# Patient Record
Sex: MALE | Race: WHITE | NOT HISPANIC OR LATINO | Employment: UNEMPLOYED | URBAN - METROPOLITAN AREA
[De-identification: names, ages, dates, MRNs, and addresses within clinical notes are randomized per-mention and may not be internally consistent; named-entity substitution may affect disease eponyms.]

---

## 2017-05-04 ENCOUNTER — OFFICE VISIT (OUTPATIENT)
Dept: ORTHOPEDIC SURGERY | Facility: CLINIC | Age: 47
End: 2017-05-04

## 2017-05-04 DIAGNOSIS — R52 PAIN: Primary | ICD-10-CM

## 2017-05-04 DIAGNOSIS — Z96.643 STATUS POST TOTAL REPLACEMENT OF BOTH HIPS: ICD-10-CM

## 2017-05-04 DIAGNOSIS — M17.11 PRIMARY OSTEOARTHRITIS OF RIGHT KNEE: ICD-10-CM

## 2017-05-04 PROCEDURE — 99214 OFFICE O/P EST MOD 30 MIN: CPT | Performed by: ORTHOPAEDIC SURGERY

## 2017-05-04 PROCEDURE — 73501 X-RAY EXAM HIP UNI 1 VIEW: CPT | Performed by: ORTHOPAEDIC SURGERY

## 2017-05-05 ENCOUNTER — HOSPITAL ENCOUNTER (OUTPATIENT)
Dept: GENERAL RADIOLOGY | Facility: HOSPITAL | Age: 47
Discharge: HOME | End: 2017-05-05
Attending: ORTHOPAEDIC SURGERY | Admitting: ORTHOPAEDIC SURGERY

## 2017-05-05 DIAGNOSIS — Z96.643 STATUS POST TOTAL REPLACEMENT OF BOTH HIPS: ICD-10-CM

## 2017-05-05 DIAGNOSIS — R52 PAIN: ICD-10-CM

## 2017-05-05 PROCEDURE — 77002 NEEDLE LOCALIZATION BY XRAY: CPT

## 2017-05-05 PROCEDURE — 25010000002 BETAMETHASONE ACET & SOD PHOS PER 4 MG: Performed by: ORTHOPAEDIC SURGERY

## 2017-05-05 RX ORDER — BETAMETHASONE SODIUM PHOSPHATE AND BETAMETHASONE ACETATE 3; 3 MG/ML; MG/ML
2 INJECTION, SUSPENSION INTRA-ARTICULAR; INTRALESIONAL; INTRAMUSCULAR; SOFT TISSUE ONCE
Status: COMPLETED | OUTPATIENT
Start: 2017-05-05 | End: 2017-05-05

## 2017-05-05 RX ORDER — LIDOCAINE HYDROCHLORIDE 10 MG/ML
10 INJECTION, SOLUTION INFILTRATION; PERINEURAL ONCE
Status: COMPLETED | OUTPATIENT
Start: 2017-05-05 | End: 2017-05-05

## 2017-05-05 RX ADMIN — BETAMETHASONE SODIUM PHOSPHATE AND BETAMETHASONE ACETATE 1.98 MG: 3; 3 INJECTION, SUSPENSION INTRA-ARTICULAR; INTRALESIONAL; INTRAMUSCULAR at 10:30

## 2017-05-05 RX ADMIN — LIDOCAINE HYDROCHLORIDE 10 ML: 10 INJECTION, SOLUTION INFILTRATION; PERINEURAL at 10:30

## 2018-04-05 ENCOUNTER — OFFICE VISIT (OUTPATIENT)
Dept: ORTHOPEDIC SURGERY | Facility: CLINIC | Age: 48
End: 2018-04-05

## 2018-04-05 DIAGNOSIS — Z96.643 STATUS POST TOTAL REPLACEMENT OF BOTH HIPS: ICD-10-CM

## 2018-04-05 DIAGNOSIS — R52 PAIN: Primary | ICD-10-CM

## 2018-04-05 DIAGNOSIS — M17.0 PRIMARY OSTEOARTHRITIS OF BOTH KNEES: ICD-10-CM

## 2018-04-05 DIAGNOSIS — M70.62 TROCHANTERIC BURSITIS OF LEFT HIP: ICD-10-CM

## 2018-04-05 PROCEDURE — 20610 DRAIN/INJ JOINT/BURSA W/O US: CPT | Performed by: ORTHOPAEDIC SURGERY

## 2018-04-05 PROCEDURE — 73562 X-RAY EXAM OF KNEE 3: CPT | Performed by: ORTHOPAEDIC SURGERY

## 2018-04-05 PROCEDURE — 73521 X-RAY EXAM HIPS BI 2 VIEWS: CPT | Performed by: ORTHOPAEDIC SURGERY

## 2018-04-05 PROCEDURE — 99214 OFFICE O/P EST MOD 30 MIN: CPT | Performed by: ORTHOPAEDIC SURGERY

## 2018-04-05 RX ORDER — TRIAMCINOLONE ACETONIDE 40 MG/ML
40 INJECTION, SUSPENSION INTRA-ARTICULAR; INTRAMUSCULAR
Status: COMPLETED | OUTPATIENT
Start: 2018-04-05 | End: 2018-04-05

## 2018-04-05 RX ORDER — DICLOFENAC SODIUM 75 MG/1
TABLET, DELAYED RELEASE ORAL
Refills: 1 | COMMUNITY
Start: 2018-02-04 | End: 2018-10-12

## 2018-04-05 RX ORDER — HYDROCHLOROTHIAZIDE 50 MG/1
25 TABLET ORAL DAILY
Refills: 1 | COMMUNITY
Start: 2018-01-14 | End: 2022-05-04

## 2018-04-05 RX ORDER — LIDOCAINE HYDROCHLORIDE 10 MG/ML
4 INJECTION, SOLUTION EPIDURAL; INFILTRATION; INTRACAUDAL; PERINEURAL
Status: COMPLETED | OUTPATIENT
Start: 2018-04-05 | End: 2018-04-05

## 2018-04-05 RX ORDER — CEFDINIR 300 MG/1
300 CAPSULE ORAL 2 TIMES DAILY
COMMUNITY
End: 2018-10-12

## 2018-04-05 RX ORDER — CITALOPRAM 10 MG/1
10 TABLET ORAL DAILY
Refills: 1 | COMMUNITY
Start: 2018-01-14 | End: 2018-10-12

## 2018-04-05 RX ORDER — ASPIRIN 325 MG
325 TABLET ORAL DAILY
COMMUNITY
End: 2018-10-12

## 2018-04-05 RX ADMIN — TRIAMCINOLONE ACETONIDE 40 MG: 40 INJECTION, SUSPENSION INTRA-ARTICULAR; INTRAMUSCULAR at 13:38

## 2018-04-05 RX ADMIN — LIDOCAINE HYDROCHLORIDE 4 ML: 10 INJECTION, SOLUTION EPIDURAL; INFILTRATION; INTRACAUDAL; PERINEURAL at 13:38

## 2018-04-05 NOTE — PROGRESS NOTES
Subjective: Status post bilateral total hip arthroplasty, left trochanteric hip pain, bilateral knee pain left worse than right     Patient ID: Luke Irizarry is a 48 y.o. male.    Chief Complaint:    History of Present Illness patient known to me status post bilateral total hip arthroplasty returns for evaluation of his left hip and both knees.  Previously seen was having some persistent right hip pain an intra-articular injection by x-ray relieved him of the pain.  He is now having pain laterally in the left hip is concerned was that he is having some damage involving the prosthesis.  Also having significant bilateral knee pain.  Last week was seen in urgent care had a cortisone injection in the left knee which helped for a few days most of the pain has returned.  With regard to the hip is having lateral hip pain with activity when lying on the hip.  With regard to the knees is having pain getting in and out of a chair and navigating steps and some sensation the knee wants to lock to the left knee.       Social History     Occupational History   • Not on file.     Social History Main Topics   • Smoking status: Former Smoker     Packs/day: 1.00     Years: 7.00     Types: Cigarettes     Quit date: 2006   • Smokeless tobacco: Never Used   • Alcohol use No   • Drug use: No   • Sexual activity: Yes     Partners: Male      Review of Systems   Constitutional: Negative for chills, diaphoresis, fever and unexpected weight change.   HENT: Negative for hearing loss, nosebleeds, sore throat and tinnitus.    Eyes: Negative for pain and visual disturbance.   Respiratory: Negative for cough, shortness of breath and wheezing.    Cardiovascular: Negative for chest pain and palpitations.   Gastrointestinal: Negative for abdominal pain, diarrhea, nausea and vomiting.   Endocrine: Negative for cold intolerance, heat intolerance and polydipsia.   Genitourinary: Negative for difficulty urinating, dysuria and hematuria.   Musculoskeletal:  Positive for arthralgias and joint swelling.   Skin: Negative for rash and wound.   Allergic/Immunologic: Negative for environmental allergies.   Neurological: Negative for dizziness, syncope and numbness.   Hematological: Does not bruise/bleed easily.   Psychiatric/Behavioral: Negative for dysphoric mood and sleep disturbance. The patient is not nervous/anxious.    All other systems reviewed and are negative.          Objective:     Ortho Exam  AP lateral sunrise view of both knee show some early arthritic changes particularly in the medial patellofemoral compartment compared to previous x-rays in 2 years ago there has been some advancement of the arthritis.  He is alert and oriented ×3.  With regard to the left leg there is no pain with passive internal/external rotation of the hip with a negative Stinchfield test.  Hip flexors and extensors are 5 over 5 with no pain.  His good distal pulses no motor deficit no sensory loss.  Marked pain to palpation of the left greater trochanter pain with abduction of the hip against resistance and a positive Carey's test.  With regard to both knees is no swelling effusion erythema.  There is mild crepitus with range of motion to both knees which is 0 to about 125° but there is no instability 0 90° no patella subluxation.  Quad function is 5 over 5 in his calf is nontender negative Homans and the skin is cool to touch.  With regard to the left knee there is moderate medial joint line tenderness with an equivocal medial Gabriel's.  He has good distal pulses and there is no motor deficit.    Assessment:       1. Pain    2. Status post total replacement of both hips    3. Trochanteric bursitis of left hip    4. Primary osteoarthritis of both knees          Plan:Spent over 20 minutes with the patient and reviewed the x-rays of his knee and his physical exam regarding his hip and both knees.  I discussed treatment options with regard to the hip with physical therapy anti-inflammatory  modification and injections or proceed with an injection and that was accomplished after sterile prepping 4 cc lidocaine 1 cc Kenalog.  Postinjection instructions given to the patient.  With regard to the knees again is having significant pain discomfort cortisone shot given last week gave marginal relief.  He has moderate arthritis noted on the x-ray but he also has possibility of a meniscal tear involving the left knee with the symptoms of the knee locking.  Want to proceed with an MRI of that knee before making any further recommendations return to see me after completion of that test.  Large Joint Arthrocentesis  Date/Time: 4/5/2018 1:38 PM  Consent given by: patient  Site marked: site marked  Timeout: Immediately prior to procedure a time out was called to verify the correct patient, procedure, equipment, support staff and site/side marked as required   Supporting Documentation  Indications: pain   Procedure Details  Location: hip - L greater trochanteric bursa  Preparation: Patient was prepped and draped in the usual sterile fashion  Needle size: 22 G  Approach: lateral  Medications administered: 4 mL lidocaine PF 1% 1 %; 40 mg triamcinolone acetonide 40 MG/ML  Patient tolerance: patient tolerated the procedure well with no immediate complications

## 2018-10-12 ENCOUNTER — HOSPITAL ENCOUNTER (EMERGENCY)
Facility: HOSPITAL | Age: 48
Discharge: HOME | End: 2018-10-12
Attending: EMERGENCY MEDICINE | Admitting: EMERGENCY MEDICINE

## 2018-10-12 VITALS
WEIGHT: 290 LBS | DIASTOLIC BLOOD PRESSURE: 90 MMHG | OXYGEN SATURATION: 96 % | HEIGHT: 72 IN | SYSTOLIC BLOOD PRESSURE: 152 MMHG | RESPIRATION RATE: 18 BRPM | HEART RATE: 74 BPM | TEMPERATURE: 98 F | BODY MASS INDEX: 39.28 KG/M2

## 2018-10-12 DIAGNOSIS — M54.40 BACK PAIN OF LUMBAR REGION WITH SCIATICA: Primary | ICD-10-CM

## 2018-10-12 PROCEDURE — 96372 THER/PROPH/DIAG INJ SC/IM: CPT

## 2018-10-12 PROCEDURE — 99282 EMERGENCY DEPT VISIT SF MDM: CPT | Performed by: EMERGENCY MEDICINE

## 2018-10-12 PROCEDURE — 25010000002 KETOROLAC TROMETHAMINE PER 15 MG: Performed by: EMERGENCY MEDICINE

## 2018-10-12 PROCEDURE — 99283 EMERGENCY DEPT VISIT LOW MDM: CPT

## 2018-10-12 RX ORDER — PANTOPRAZOLE SODIUM 40 MG/1
40 TABLET, DELAYED RELEASE ORAL DAILY
COMMUNITY
End: 2022-05-04 | Stop reason: SDUPTHER

## 2018-10-12 RX ORDER — CELECOXIB 200 MG/1
200 CAPSULE ORAL DAILY
COMMUNITY
End: 2019-08-22

## 2018-10-12 RX ORDER — HYDROCODONE BITARTRATE AND ACETAMINOPHEN 5; 325 MG/1; MG/1
1 TABLET ORAL EVERY 8 HOURS PRN
Qty: 15 TABLET | Refills: 0 | Status: SHIPPED | OUTPATIENT
Start: 2018-10-12 | End: 2018-10-17

## 2018-10-12 RX ORDER — METHYLPREDNISOLONE 4 MG/1
TABLET ORAL
Qty: 21 TABLET | Refills: 0 | Status: SHIPPED | OUTPATIENT
Start: 2018-10-12 | End: 2019-01-07

## 2018-10-12 RX ORDER — KETOROLAC TROMETHAMINE 30 MG/ML
30 INJECTION, SOLUTION INTRAMUSCULAR; INTRAVENOUS ONCE
Status: COMPLETED | OUTPATIENT
Start: 2018-10-12 | End: 2018-10-12

## 2018-10-12 RX ADMIN — KETOROLAC TROMETHAMINE 30 MG: 30 INJECTION INTRAMUSCULAR; INTRAVENOUS at 13:21

## 2018-10-12 NOTE — ED PROVIDER NOTES
Subjective   History of Present Illness  History of Present Illness    Chief complaint: Back pain    Location: Lower back, radiating into both legs    Quality/Severity:  Moderate to severe sharp pains    Timing/Duration: Ongoing for several days now    Modifying Factors: Worse with standing and walking    Narrative: This patient has a history of low back pain problems.  He is being cared for by a spine specialist and has plans to have lumbar spine surgery in December this year.  He denies any recent injuries or traumatic type of situations where he may have hurt his back that he can recall.  However over the past few days his pain seems to be increasing quite a bit in the lumbar spine area.  He says it radiates down both legs equally and causes some mild numbness in the toes.  He has not had any fevers.  He has not had any falls.  He has not had any bowel or bladder incontinence.  Normally he can ambulate without any cane or assistance.  He says lately it has become particularly because the pain in his back with any movement.  He has not been taking any particular medications for the pain up until this time.  Incidentally he did have a lumbar spine surgery about 8 years ago for a similar problem.    Associated Symptoms: As above    Review of Systems   Constitutional: Negative for activity change, diaphoresis and fever.   Respiratory: Negative for shortness of breath.    Cardiovascular: Negative for chest pain.   Gastrointestinal: Negative for abdominal pain.   Genitourinary: Negative for dysuria and flank pain.   Musculoskeletal: Positive for back pain and myalgias.   Skin: Negative for color change, rash and wound.   Neurological: Positive for numbness. Negative for syncope and weakness.   All other systems reviewed and are negative.      Past Medical History:   Diagnosis Date   • Arthritis     RIGHT HAND   • GERD (gastroesophageal reflux disease)    • Hearing deficit    • Hypertension    • Migraines    • Right hip  pain     sched BRAD   • Sleep apnea     no machine, states needs to have new study   • Spinal headache     AFTER SPINAL TAP REQUIRED BLOOD PATCH       No Known Allergies    Past Surgical History:   Procedure Laterality Date   • APPENDECTOMY     • BACK SURGERY      X 2   • CATARACT EXTRACTION      RIGHT EYE CATARACT REMOVED   • KNEE SURGERY Left 2011   • LAPAROSCOPIC CHOLECYSTECTOMY     • SHOULDER SURGERY Bilateral     X 2 Dr. Houston   • THYROIDECTOMY, PARTIAL     • TOTAL HIP ARTHROPLASTY Left 6/21/2016    Procedure: TOTAL HIP ARTHROPLASTY;  Surgeon: Sanchez Goel MD;  Location:  [facility];  Service:    • TOTAL HIP ARTHROPLASTY Right 8/16/2016    Procedure: TOTAL HIP ARTHROPLASTY depuy summit 7fr hemovac drain placement;  Surgeon: Sanchez Goel MD;  Location:  [facility] OR;  Service:    • WRIST SURGERY Left        Family History   Problem Relation Age of Onset   • Lung disease Father    • COPD Father    • Cancer Sister    • Cancer Paternal Aunt    • Diabetes Maternal Grandmother    • Diabetes Maternal Grandfather    • Diabetes Paternal Grandmother    • Diabetes Paternal Grandfather        Social History     Social History   • Marital status:      Social History Main Topics   • Smoking status: Former Smoker     Packs/day: 1.00     Years: 7.00     Types: Cigarettes     Quit date: 2006   • Smokeless tobacco: Never Used   • Alcohol use No   • Drug use: No   • Sexual activity: Yes     Partners: Male     Other Topics Concern   • Not on file       ED Triage Vitals [10/12/18 1257]   Temp Heart Rate Resp BP SpO2   98 °F (36.7 °C) 74 18 152/90 96 %      Temp src Heart Rate Source Patient Position BP Location FiO2 (%)   Oral Monitor Sitting Right arm --         Objective   Physical Exam   Constitutional: He is oriented to person, place, and time. He appears well-developed and well-nourished. No distress.   HENT:   Head: Normocephalic and atraumatic.   Eyes: Pupils are equal, round, and reactive to light. EOM are normal. Right  "eye exhibits no discharge. Left eye exhibits no discharge.   Neck: Normal range of motion. Neck supple.   Cardiovascular: Normal rate, regular rhythm, normal heart sounds and intact distal pulses.  Exam reveals no gallop and no friction rub.    No murmur heard.  Pulmonary/Chest: Effort normal. No respiratory distress. He has no wheezes. He has no rales. He exhibits no tenderness.   Abdominal: Soft. He exhibits no distension. There is no tenderness. There is no guarding.   Musculoskeletal: Normal range of motion. He exhibits tenderness (moderate tenderness throughout the inferior aspect of the lumbosacral spine and back soft tissues, greatest around the paravertebral regions bilaterally.  No point level of maximal tenderness to palpation elicited.  No step-offs or deformities apparent.  N). He exhibits no edema or deformity.   Neurological: He is alert and oriented to person, place, and time. No sensory deficit. He exhibits normal muscle tone.   Normal neurologic exam.  Normal toe extension bilaterally.  Normal sensation to both legs   Skin: Skin is warm and dry. No rash noted. He is not diaphoretic. No erythema.   Psychiatric: He has a normal mood and affect. His behavior is normal. Judgment and thought content normal.   Nursing note and vitals reviewed.      Procedures           ED Course  ED Course as of Oct 12 1621   Fri Oct 12, 2018   1620 Patient presented for nontraumatic acute on chronic low back pain problems with some worsening sciatica features.  His physical exam was not worrisome for any particularly emergent concerns.  I did not feel it was a clinical indication for imaging at this time especially since she is on he had an outpatient MRI of the spine.  I put him on a Medrol Dosepak course and a few days of hydrocodone.  I advised that he should call his spine doctor to follow-up in the office as soon as possible.  Discharged home in good condition with the usual \"return to ER\" instructions for any " worsening signs or symptoms.  [KINGSLEY]      ED Course User Index  [KINGSLEY] Steven Mock MD                  [facility]      Final diagnoses:   Back pain of lumbar region with sciatica            Steven Mock MD  10/12/18 6087

## 2019-07-25 ENCOUNTER — OFFICE VISIT (OUTPATIENT)
Dept: ORTHOPEDIC SURGERY | Facility: CLINIC | Age: 49
End: 2019-07-25

## 2019-07-25 VITALS — BODY MASS INDEX: 41.58 KG/M2 | WEIGHT: 307 LBS | HEIGHT: 72 IN

## 2019-07-25 DIAGNOSIS — M17.0 PRIMARY OSTEOARTHRITIS OF BOTH KNEES: Primary | ICD-10-CM

## 2019-07-25 PROCEDURE — 99213 OFFICE O/P EST LOW 20 MIN: CPT | Performed by: ORTHOPAEDIC SURGERY

## 2019-07-25 NOTE — PROGRESS NOTES
Subjective: Bilateral knee pain     Patient ID: Luke Irizarry is a 49 y.o. male.    Chief Complaint:    History of Present Illness 49-year-old male known to me having previously treated bilateral total hips and also for arthritis in his knee returns with persistent pain discomfort in both knees.  Previously treated over year or 2 ago with gel injections which helped reduce the pain is now once again having significant discomfort with any all activities of daily living.  Takes Celebrex on a regular basis but is noted no improvement.  Pain with sitting and then attempted to ambulate climbing steps and walking long distances.  No sensation of the knee locking some sensation of the knee giving way but has not done so.  Describes pain on a daily basis at rest and with activity.       Social History     Occupational History   • Not on file   Tobacco Use   • Smoking status: Former Smoker     Packs/day: 1.00     Years: 7.00     Pack years: 7.00     Types: Cigarettes     Last attempt to quit: 2006     Years since quittin.5   • Smokeless tobacco: Never Used   Substance and Sexual Activity   • Alcohol use: No   • Drug use: No   • Sexual activity: Yes     Partners: Male      Review of Systems   Constitutional: Negative for chills, diaphoresis, fever and unexpected weight change.   HENT: Negative for hearing loss, nosebleeds, sore throat and tinnitus.    Eyes: Negative for pain and visual disturbance.   Respiratory: Negative for cough, shortness of breath and wheezing.    Cardiovascular: Negative for chest pain and palpitations.   Gastrointestinal: Negative for abdominal pain, diarrhea, nausea and vomiting.   Endocrine: Negative for cold intolerance, heat intolerance and polydipsia.   Genitourinary: Negative for difficulty urinating, dysuria and hematuria.   Musculoskeletal: Positive for arthralgias and myalgias. Negative for joint swelling.   Skin: Negative for rash and wound.   Allergic/Immunologic: Negative for  environmental allergies.   Neurological: Negative for dizziness, syncope and numbness.   Hematological: Does not bruise/bleed easily.   Psychiatric/Behavioral: Negative for dysphoric mood and sleep disturbance. The patient is not nervous/anxious.          Past Medical History:   Diagnosis Date   • Arthritis     RIGHT HAND   • GERD (gastroesophageal reflux disease)    • Hearing deficit    • Hypertension    • Migraines    • Right hip pain     sched BRAD   • Sleep apnea     no machine, states needs to have new study   • Spinal headache     AFTER SPINAL TAP REQUIRED BLOOD PATCH     Past Surgical History:   Procedure Laterality Date   • APPENDECTOMY     • BACK SURGERY      X 2   • CATARACT EXTRACTION      RIGHT EYE CATARACT REMOVED   • KNEE SURGERY Left 2011   • LAPAROSCOPIC CHOLECYSTECTOMY     • SHOULDER SURGERY Bilateral     X 2 Dr. Houston   • THYROIDECTOMY, PARTIAL     • TOTAL HIP ARTHROPLASTY Left 6/21/2016    Procedure: TOTAL HIP ARTHROPLASTY;  Surgeon: Sanchez Goel MD;  Location:  [facility];  Service:    • TOTAL HIP ARTHROPLASTY Right 8/16/2016    Procedure: TOTAL HIP ARTHROPLASTY depuy summit 7fr hemovac drain placement;  Surgeon: Sanchez Goel MD;  Location: [facility];  Service:    • WRIST SURGERY Left      Family History   Problem Relation Age of Onset   • Lung disease Father    • COPD Father    • Cancer Sister    • Cancer Paternal Aunt    • Diabetes Maternal Grandmother    • Diabetes Maternal Grandfather    • Diabetes Paternal Grandmother    • Diabetes Paternal Grandfather          Objective:  There were no vitals filed for this visit.      07/25/19  1508   Weight: (!) 139 kg (307 lb)     Body mass index is 41.64 kg/m².        Ortho Exam    AP lateral sunrise view of both knees to evaluate his chief complaint does show medial patellofemoral arthritic changes with bone spurs and decrease in the joint space and compared to x-rays done several years ago there is increasing arthritic changes noted.  He is alert and  oriented x3.  Neither knee shows any swelling effusion erythema but there is moderate crepitus with range of motion 0 to 125 degrees.  Minimal pain with compression no pain, no patella subluxation.  Bilateral joint line tenderness with negative Gabriel's.  No instability at 0 90 degrees nor is or instability 0 30 degrees.  Quad function 5/5 in the calf is nontender.  Good distal pulses no motor or sensory deficit good capillary refill.  Tolerates Celebrex without any GI side effect did not help his pain.  Again he describes moderate pain that is interfering with any all activities of daily living.  Assessment:      No diagnosis found.       Plan: Spent over 10 minutes with the patient once again reviewing his physical findings and his x-rays.  Recommend repeat gel injections will contact insurance company regarding that return to begin those once that authorization has been obtained            Work Status:    BEN query complete.    Orders:  No orders of the defined types were placed in this encounter.      Medications:  No orders of the defined types were placed in this encounter.      Followup:  No Follow-up on file.          Dictated utilizing Dragon dictation

## 2019-08-12 ENCOUNTER — TELEPHONE (OUTPATIENT)
Dept: ORTHOPEDIC SURGERY | Facility: CLINIC | Age: 49
End: 2019-08-12

## 2019-08-12 NOTE — TELEPHONE ENCOUNTER
Insurance came back inactive while running gel injections benefits.  I spoke to patient and he said it should be reactivated tomorrow. 08/13.  I told him we would rerun on Wednesday.

## 2019-08-23 ENCOUNTER — TELEPHONE (OUTPATIENT)
Dept: ORTHOPEDIC SURGERY | Facility: CLINIC | Age: 49
End: 2019-08-23

## 2019-08-23 NOTE — TELEPHONE ENCOUNTER
Visco injections were denied due to patient needs to complete a weight loss program due to his BMI is over 30. Patient was notified.

## 2019-11-25 ENCOUNTER — OFFICE VISIT (OUTPATIENT)
Dept: ORTHOPEDIC SURGERY | Facility: CLINIC | Age: 49
End: 2019-11-25

## 2019-11-25 VITALS
WEIGHT: 302 LBS | BODY MASS INDEX: 40.02 KG/M2 | DIASTOLIC BLOOD PRESSURE: 93 MMHG | HEART RATE: 85 BPM | SYSTOLIC BLOOD PRESSURE: 147 MMHG | HEIGHT: 73 IN

## 2019-11-25 DIAGNOSIS — M75.51 SUBACROMIAL BURSITIS OF RIGHT SHOULDER JOINT: ICD-10-CM

## 2019-11-25 DIAGNOSIS — R52 PAIN: Primary | ICD-10-CM

## 2019-11-25 PROCEDURE — 20610 DRAIN/INJ JOINT/BURSA W/O US: CPT | Performed by: ORTHOPAEDIC SURGERY

## 2019-11-25 PROCEDURE — 99214 OFFICE O/P EST MOD 30 MIN: CPT | Performed by: ORTHOPAEDIC SURGERY

## 2019-11-25 PROCEDURE — 73030 X-RAY EXAM OF SHOULDER: CPT | Performed by: ORTHOPAEDIC SURGERY

## 2019-11-25 RX ORDER — BETAMETHASONE SODIUM PHOSPHATE AND BETAMETHASONE ACETATE 3; 3 MG/ML; MG/ML
6 INJECTION, SUSPENSION INTRA-ARTICULAR; INTRALESIONAL; INTRAMUSCULAR; SOFT TISSUE
Status: COMPLETED | OUTPATIENT
Start: 2019-11-25 | End: 2019-11-25

## 2019-11-25 RX ORDER — OXYCODONE AND ACETAMINOPHEN 7.5; 325 MG/1; MG/1
TABLET ORAL
Refills: 0 | COMMUNITY
Start: 2019-10-21 | End: 2020-05-12

## 2019-11-25 RX ORDER — LIDOCAINE HYDROCHLORIDE 10 MG/ML
4 INJECTION, SOLUTION EPIDURAL; INFILTRATION; INTRACAUDAL; PERINEURAL
Status: COMPLETED | OUTPATIENT
Start: 2019-11-25 | End: 2019-11-25

## 2019-11-25 RX ADMIN — BETAMETHASONE SODIUM PHOSPHATE AND BETAMETHASONE ACETATE 6 MG: 3; 3 INJECTION, SUSPENSION INTRA-ARTICULAR; INTRALESIONAL; INTRAMUSCULAR; SOFT TISSUE at 14:37

## 2019-11-25 RX ADMIN — LIDOCAINE HYDROCHLORIDE 4 ML: 10 INJECTION, SOLUTION EPIDURAL; INFILTRATION; INTRACAUDAL; PERINEURAL at 14:37

## 2019-11-25 NOTE — PROGRESS NOTES
Subjective: Right shoulder pain     Patient ID: Luke Irizarry is a 49 y.o. male.    Chief Complaint:    History of Present Illness 49-year-old male known to me having undergone bilateral total hip replacements in the past presents with a 2-month history of right shoulder pain.  No history of trauma but had persistent and progressive pain in the shoulder.  Had a rotator cuff repair many years ago by another physician and is done well to just recently.  Complains of pain with activity no particular pain at rest.  Will have pain when he wakes up at night lying on the right side.  Is currently taking Celebrex as an anti-inflammatory.  He describes the pain is 8 out of 10 and and describes it as an achy type pain.       Social History     Occupational History   • Not on file   Tobacco Use   • Smoking status: Former Smoker     Packs/day: 1.00     Years: 7.00     Pack years: 7.00     Types: Cigarettes     Last attempt to quit: 2006     Years since quittin.9   • Smokeless tobacco: Never Used   Substance and Sexual Activity   • Alcohol use: No   • Drug use: No   • Sexual activity: Yes     Partners: Male      Review of Systems   Constitutional: Negative for chills, diaphoresis, fever and unexpected weight change.   HENT: Negative for hearing loss, nosebleeds, sore throat and tinnitus.    Eyes: Negative for pain and visual disturbance.   Respiratory: Negative for cough, shortness of breath and wheezing.    Cardiovascular: Negative for chest pain and palpitations.   Gastrointestinal: Negative for abdominal pain, diarrhea, nausea and vomiting.   Endocrine: Negative for cold intolerance, heat intolerance and polydipsia.   Genitourinary: Negative for difficulty urinating, dysuria and hematuria.   Musculoskeletal: Positive for arthralgias and myalgias. Negative for joint swelling.   Skin: Negative for rash and wound.   Allergic/Immunologic: Negative for environmental allergies.   Neurological: Negative for dizziness, syncope and  numbness.   Hematological: Does not bruise/bleed easily.   Psychiatric/Behavioral: Negative for dysphoric mood and sleep disturbance. The patient is not nervous/anxious.          Past Medical History:   Diagnosis Date   • Arthritis     RIGHT HAND   • GERD (gastroesophageal reflux disease)    • Hearing deficit    • Hypertension    • Migraines    • Right hip pain     sched BRAD   • Sleep apnea     no machine, states needs to have new study   • Spinal headache     AFTER SPINAL TAP REQUIRED BLOOD PATCH     Past Surgical History:   Procedure Laterality Date   • APPENDECTOMY     • BACK SURGERY      X 2   • CATARACT EXTRACTION      RIGHT EYE CATARACT REMOVED   • KNEE SURGERY Left 2011   • LAPAROSCOPIC CHOLECYSTECTOMY     • SHOULDER SURGERY Bilateral     X 2 Dr. Houston   • THYROIDECTOMY, PARTIAL     • TOTAL HIP ARTHROPLASTY Left 6/21/2016    Procedure: TOTAL HIP ARTHROPLASTY;  Surgeon: Sanchez Goel MD;  Location:  [facility] OR;  Service:    • TOTAL HIP ARTHROPLASTY Right 8/16/2016    Procedure: TOTAL HIP ARTHROPLASTY depuy summit 7fr hemovac drain placement;  Surgeon: Sanchez Goel MD;  Location:  [facility] OR;  Service:    • WRIST SURGERY Left      Family History   Problem Relation Age of Onset   • Lung disease Father    • COPD Father    • Cancer Sister    • Cancer Paternal Aunt    • Diabetes Maternal Grandmother    • Diabetes Maternal Grandfather    • Diabetes Paternal Grandmother    • Diabetes Paternal Grandfather          Objective:  Vitals:    11/25/19 1358   BP: 147/93   Pulse: 85         11/25/19  1358   Weight: (!) 137 kg (302 lb)     Body mass index is 39.84 kg/m².        Ortho Exam   AP lateral outlet view of the shoulder shows evidence of a previous repair with metallic anchor in the humeral head.  There is mild glenohumeral arthritic changes noted but there is no acute changes.  No prior x-rays available for comparison.  He is alert and oriented x3.  Head is normocephalic and sclerae clear.  Right upper extremity has no  motor or sensory deficits right radial ulnar median nerve function.  He has full range of motion of the elbow 5 flexion extension pronation supination.  He can extend about 130 degrees and abduct to about 100 degrees.  Abduction extension against resistance at 90 degrees is intact and mildly painful.  Mildly positive Ramirez sign.  External rotation is approximately 40 degrees and internal rotation is 35 to 40 degrees.  Biceps function is 5/5 and deltoid function is 4 and half out of 5 secondary to pain.  There is no swelling or erythema.  He has full range of motion of the neck with no radiculopathy.  Is a negative Speed test.  Tolerates the Celebrex without any GI side effect is not improving his shoulder symptoms at all.  He describes again moderate discomfort interfere with actives of daily living.    Assessment:        1. Pain    2. Subacromial bursitis of right shoulder joint           Plan: Over 15 minutes was spent with the patient face-to-face reviewing his x-rays his history and physical exam.  No history of trauma I think is more likely developed an acute bursitis in the shoulder.  After discussing treatment options as he is already currently taking Celebrex to proceed with a cortisone injection a lidocaine Celestone as he is diabetic.  That injection was given to the posterior portal tolerating well.  Postinjection instructions given to the patient particular about monitoring his blood glucose level.  Return in a month to reevaluate the shoulder.  Also that time may inject his left hip which was previously treated in the past for bursitis but as I discussed with him as diabetic I did want to inject both joints today.  Answered all questions.  Large Joint Arthrocentesis: R subacromial bursa  Date/Time: 11/25/2019 2:37 PM  Consent given by: patient  Site marked: site marked  Timeout: Immediately prior to procedure a time out was called to verify the correct patient, procedure, equipment, support staff and  site/side marked as required   Supporting Documentation  Indications: pain   Procedure Details  Location: shoulder - R subacromial bursa  Preparation: Patient was prepped and draped in the usual sterile fashion  Needle size: 22 G  Approach: posterior  Medications administered: 4 mL lidocaine PF 1% 1 %; 6 mg betamethasone acetate-betamethasone sodium phosphate 6 (3-3) MG/ML  Patient tolerance: patient tolerated the procedure well with no immediate complications                  Work Status:    BEN query complete.    Orders:  Orders Placed This Encounter   Procedures   • Large Joint Arthrocentesis: R subacromial bursa   • XR Shoulder 2+ View Right       Medications:  No orders of the defined types were placed in this encounter.      Followup:  Return in about 3 weeks (around 12/16/2019).          Dictated utilizing Dragon dictation

## 2020-02-22 ENCOUNTER — HOSPITAL ENCOUNTER (EMERGENCY)
Facility: HOSPITAL | Age: 50
Discharge: HOME | End: 2020-02-22
Attending: EMERGENCY MEDICINE | Admitting: EMERGENCY MEDICINE

## 2020-02-22 VITALS
BODY MASS INDEX: 40.63 KG/M2 | SYSTOLIC BLOOD PRESSURE: 159 MMHG | RESPIRATION RATE: 18 BRPM | TEMPERATURE: 98.4 F | HEIGHT: 72 IN | DIASTOLIC BLOOD PRESSURE: 102 MMHG | WEIGHT: 300 LBS | OXYGEN SATURATION: 97 % | HEART RATE: 73 BPM

## 2020-02-22 DIAGNOSIS — J32.9 OTHER SINUSITIS, UNSPECIFIED CHRONICITY: Primary | ICD-10-CM

## 2020-02-22 DIAGNOSIS — J40 BRONCHITIS: ICD-10-CM

## 2020-02-22 PROCEDURE — 99283 EMERGENCY DEPT VISIT LOW MDM: CPT

## 2020-02-22 PROCEDURE — 99282 EMERGENCY DEPT VISIT SF MDM: CPT | Performed by: EMERGENCY MEDICINE

## 2020-02-22 PROCEDURE — 25010000002 METHYLPREDNISOLONE PER 125 MG: Performed by: EMERGENCY MEDICINE

## 2020-02-22 PROCEDURE — 94640 AIRWAY INHALATION TREATMENT: CPT

## 2020-02-22 PROCEDURE — 96372 THER/PROPH/DIAG INJ SC/IM: CPT

## 2020-02-22 RX ORDER — AZITHROMYCIN 500 MG/1
500 TABLET, FILM COATED ORAL DAILY
Qty: 6 TABLET | Refills: 0 | Status: SHIPPED | OUTPATIENT
Start: 2020-02-22 | End: 2020-06-20

## 2020-02-22 RX ORDER — AMOXICILLIN AND CLAVULANATE POTASSIUM 875; 125 MG/1; MG/1
1 TABLET, FILM COATED ORAL ONCE
Status: COMPLETED | OUTPATIENT
Start: 2020-02-22 | End: 2020-02-22

## 2020-02-22 RX ORDER — IPRATROPIUM BROMIDE AND ALBUTEROL SULFATE 2.5; .5 MG/3ML; MG/3ML
3 SOLUTION RESPIRATORY (INHALATION) ONCE
Status: COMPLETED | OUTPATIENT
Start: 2020-02-22 | End: 2020-02-22

## 2020-02-22 RX ORDER — METHYLPREDNISOLONE SODIUM SUCCINATE 125 MG/2ML
125 INJECTION, POWDER, LYOPHILIZED, FOR SOLUTION INTRAMUSCULAR; INTRAVENOUS ONCE
Status: COMPLETED | OUTPATIENT
Start: 2020-02-22 | End: 2020-02-22

## 2020-02-22 RX ORDER — ALBUTEROL SULFATE 90 UG/1
2 AEROSOL, METERED RESPIRATORY (INHALATION) EVERY 4 HOURS PRN
Qty: 1 INHALER | Refills: 0 | Status: SHIPPED | OUTPATIENT
Start: 2020-02-22 | End: 2022-06-28 | Stop reason: SDUPTHER

## 2020-02-22 RX ADMIN — METHYLPREDNISOLONE SODIUM SUCCINATE 125 MG: 125 INJECTION, POWDER, FOR SOLUTION INTRAMUSCULAR; INTRAVENOUS at 19:35

## 2020-02-22 RX ADMIN — IPRATROPIUM BROMIDE AND ALBUTEROL SULFATE 3 ML: .5; 3 SOLUTION RESPIRATORY (INHALATION) at 19:34

## 2020-02-22 RX ADMIN — AMOXICILLIN AND CLAVULANATE POTASSIUM 1 TABLET: 875; 125 TABLET, FILM COATED ORAL at 19:35

## 2020-02-23 NOTE — ED PROVIDER NOTES
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   Respiratory: Positive for cough and wheezing. Negative for shortness of breath.    Cardiovascular: Negative for chest pain.   All other systems reviewed and are negative.      Past Medical History:   Diagnosis Date   • Arthritis     RIGHT HAND   • GERD (gastroesophageal reflux disease)    • Hearing deficit    • Hypertension    • Migraines    • Right hip pain     sched BRAD   • Sleep apnea     no machine, states needs to have new study   • Spinal headache     AFTER SPINAL TAP REQUIRED BLOOD PATCH       No Known Allergies    Past Surgical History:   Procedure Laterality Date   • APPENDECTOMY     • BACK SURGERY      X 2   • CATARACT EXTRACTION      RIGHT EYE CATARACT REMOVED   • KNEE SURGERY Left 2011   • LAPAROSCOPIC CHOLECYSTECTOMY     • SHOULDER SURGERY Bilateral     X 2 Dr. Houston   • THYROIDECTOMY, PARTIAL     • TOTAL HIP ARTHROPLASTY Left 6/21/2016    Procedure: TOTAL HIP ARTHROPLASTY;  Surgeon: Sanchez Goel MD;  Location:  [facility];  Service:    • TOTAL HIP ARTHROPLASTY Right 8/16/2016    Procedure: TOTAL HIP ARTHROPLASTY depuy summit 7fr hemovac drain placement;  Surgeon: Sanchez Goel MD;  Location: [facility] OR;  Service:    • WRIST SURGERY Left        Family History   Problem Relation Age of Onset   • Lung disease Father    • COPD Father    • Cancer Sister    • Cancer Paternal Aunt    • Diabetes Maternal Grandmother    •  Diabetes Maternal Grandfather    • Diabetes Paternal Grandmother    • Diabetes Paternal Grandfather        Social History     Socioeconomic History   • Marital status:      Spouse name: Not on file   • Number of children: Not on file   • Years of education: Not on file   • Highest education level: Not on file   Tobacco Use   • Smoking status: Former Smoker     Packs/day: 1.00     Years: 7.00     Pack years: 7.00     Types: Cigarettes     Last attempt to quit: 2006     Years since quittin.1   • Smokeless tobacco: Never Used   Substance and Sexual Activity   • Alcohol use: No   • Drug use: No   • Sexual activity: Yes     Partners: Male           Objective   Physical Exam   Constitutional: He appears well-developed and well-nourished. No distress.   Cardiovascular: Normal rate, regular rhythm, normal heart sounds and intact distal pulses.   No murmur heard.  Pulmonary/Chest: Effort normal. No stridor. No respiratory distress. He has wheezes. He has no rales. He exhibits no tenderness.   Mild scattered wheeze ariane   Skin: Skin is warm. No rash noted. He is not diaphoretic. No erythema. No pallor.   Psychiatric: He has a normal mood and affect. His behavior is normal.   Nursing note and vitals reviewed.      Procedures           ED Course         ok with plan to f/u prn                                  MDM  Number of Diagnoses or Management Options  Risk of Complications, Morbidity, and/or Mortality  Presenting problems: low  Diagnostic procedures: low  Management options: low    Patient Progress  Patient progress: stable      Final diagnoses:   Other sinusitis, unspecified chronicity   Bronchitis            Manuel Galeana MD  20

## 2020-05-12 ENCOUNTER — HOSPITAL ENCOUNTER (EMERGENCY)
Facility: HOSPITAL | Age: 50
Discharge: HOME | End: 2020-05-12
Attending: EMERGENCY MEDICINE | Admitting: EMERGENCY MEDICINE

## 2020-05-12 ENCOUNTER — APPOINTMENT (OUTPATIENT)
Dept: GENERAL RADIOLOGY | Facility: HOSPITAL | Age: 50
End: 2020-05-12

## 2020-05-12 VITALS
WEIGHT: 307 LBS | SYSTOLIC BLOOD PRESSURE: 161 MMHG | BODY MASS INDEX: 41.58 KG/M2 | TEMPERATURE: 97.5 F | HEART RATE: 73 BPM | OXYGEN SATURATION: 98 % | DIASTOLIC BLOOD PRESSURE: 97 MMHG | HEIGHT: 72 IN | RESPIRATION RATE: 18 BRPM

## 2020-05-12 DIAGNOSIS — M54.42 ACUTE LEFT-SIDED LOW BACK PAIN WITH LEFT-SIDED SCIATICA: Primary | ICD-10-CM

## 2020-05-12 PROCEDURE — 96372 THER/PROPH/DIAG INJ SC/IM: CPT

## 2020-05-12 PROCEDURE — 99282 EMERGENCY DEPT VISIT SF MDM: CPT | Performed by: EMERGENCY MEDICINE

## 2020-05-12 PROCEDURE — 72100 X-RAY EXAM L-S SPINE 2/3 VWS: CPT

## 2020-05-12 PROCEDURE — 73502 X-RAY EXAM HIP UNI 2-3 VIEWS: CPT

## 2020-05-12 PROCEDURE — 99283 EMERGENCY DEPT VISIT LOW MDM: CPT

## 2020-05-12 PROCEDURE — 25010000002 KETOROLAC TROMETHAMINE PER 15 MG: Performed by: EMERGENCY MEDICINE

## 2020-05-12 RX ORDER — TRAMADOL HYDROCHLORIDE 50 MG/1
50 TABLET ORAL EVERY 4 HOURS PRN
Qty: 15 TABLET | Refills: 0 | OUTPATIENT
Start: 2020-05-12 | End: 2020-08-26

## 2020-05-12 RX ORDER — KETOROLAC TROMETHAMINE 30 MG/ML
60 INJECTION, SOLUTION INTRAMUSCULAR; INTRAVENOUS ONCE
Status: COMPLETED | OUTPATIENT
Start: 2020-05-12 | End: 2020-05-12

## 2020-05-12 RX ADMIN — KETOROLAC TROMETHAMINE 60 MG: 30 INJECTION INTRAMUSCULAR; INTRAVENOUS at 10:05

## 2020-05-12 NOTE — ED PROVIDER NOTES
Subjective     Back Pain   Location:  Lumbar spine and gluteal region  Quality:  Shooting  Radiates to:  L thigh  Pain severity:  Severe  Pain is:  Same all the time  Onset quality:  Gradual  Duration:  1 day  Timing:  Constant  Progression:  Unchanged  Chronicity:  New  Context comment:  Says he had back surgery last year with some residual left thigh numbness, also h/o left hip surgery, today with left low back pain rad to left buttock and thigh, no injury  Relieved by:  Being still  Worsened by:  Ambulation and standing  Ineffective treatments:  None tried  Associated symptoms: leg pain    Associated symptoms: no abdominal pain, no bladder incontinence, no bowel incontinence, no dysuria, no fever, no numbness, no paresthesias, no perianal numbness, no tingling and no weakness        Review of Systems   Constitutional: Negative for fever.   Gastrointestinal: Negative for abdominal pain and bowel incontinence.   Genitourinary: Negative for bladder incontinence and dysuria.   Musculoskeletal: Positive for back pain.   Neurological: Negative for tingling, weakness, numbness and paresthesias.   All other systems reviewed and are negative.      Past Medical History:   Diagnosis Date   • Arthritis     RIGHT HAND   • GERD (gastroesophageal reflux disease)    • Hearing deficit    • Hypertension    • Migraines    • Right hip pain     sched BRAD   • Sleep apnea     no machine, states needs to have new study   • Spinal headache     AFTER SPINAL TAP REQUIRED BLOOD PATCH       No Known Allergies    Past Surgical History:   Procedure Laterality Date   • APPENDECTOMY     • BACK SURGERY      X 2   • CATARACT EXTRACTION      RIGHT EYE CATARACT REMOVED   • KNEE SURGERY Left 2011   • LAPAROSCOPIC CHOLECYSTECTOMY     • SHOULDER SURGERY Bilateral     X 2 Dr. Houston   • THYROIDECTOMY, PARTIAL     • TOTAL HIP ARTHROPLASTY Left 6/21/2016    Procedure: TOTAL HIP ARTHROPLASTY;  Surgeon: Sanchez Goel MD;  Location: [facility];  Service:    •  TOTAL HIP ARTHROPLASTY Right 2016    Procedure: TOTAL HIP ARTHROPLASTY depuy summit 7fr hemovac drain placement;  Surgeon: Sanchez Goel MD;  Location: [facility] OR;  Service:    • WRIST SURGERY Left        Family History   Problem Relation Age of Onset   • Lung disease Father    • COPD Father    • Cancer Sister    • Cancer Paternal Aunt    • Diabetes Maternal Grandmother    • Diabetes Maternal Grandfather    • Diabetes Paternal Grandmother    • Diabetes Paternal Grandfather        Social History     Socioeconomic History   • Marital status:      Spouse name: Not on file   • Number of children: Not on file   • Years of education: Not on file   • Highest education level: Not on file   Tobacco Use   • Smoking status: Former Smoker     Packs/day: 1.00     Years: 7.00     Pack years: 7.00     Types: Cigarettes     Last attempt to quit:      Years since quittin.3   • Smokeless tobacco: Never Used   Substance and Sexual Activity   • Alcohol use: No   • Drug use: No   • Sexual activity: Yes     Partners: Male           Objective   Physical Exam   Constitutional: He appears well-developed and well-nourished. No distress.   Abdominal: Soft. He exhibits no distension. There is no tenderness. There is no guarding.   Musculoskeletal: Normal range of motion. He exhibits no edema or deformity.   Left low back/buttock/thigh subjective pain with rom   Neurological: No sensory deficit. He exhibits normal muscle tone. Coordination normal.   Skin: Skin is warm. No rash noted. He is not diaphoretic. No erythema.   Nursing note and vitals reviewed.      Procedures           ED Course         reeval, neuro intact, ok with plan to rx and f/u pcp                                  MDM  Number of Diagnoses or Management Options  Acute left-sided low back pain with left-sided sciatica:      Amount and/or Complexity of Data Reviewed  Tests in the radiology section of CPT®: ordered and reviewed    Risk of Complications,  Morbidity, and/or Mortality  Presenting problems: low  Diagnostic procedures: low  Management options: low    Patient Progress  Patient progress: stable      Final diagnoses:   Acute left-sided low back pain with left-sided sciatica            Manuel Galeana MD  05/12/20 1037

## 2020-07-20 ENCOUNTER — OFFICE VISIT (OUTPATIENT)
Dept: ORTHOPEDIC SURGERY | Facility: CLINIC | Age: 50
End: 2020-07-20

## 2020-07-20 DIAGNOSIS — G89.29 OTHER CHRONIC PAIN: Primary | ICD-10-CM

## 2020-07-20 DIAGNOSIS — M70.22 OLECRANON BURSITIS OF LEFT ELBOW: ICD-10-CM

## 2020-07-20 PROCEDURE — 20605 DRAIN/INJ JOINT/BURSA W/O US: CPT | Performed by: ORTHOPAEDIC SURGERY

## 2020-07-20 PROCEDURE — 99214 OFFICE O/P EST MOD 30 MIN: CPT | Performed by: ORTHOPAEDIC SURGERY

## 2020-07-20 RX ORDER — BETAMETHASONE SODIUM PHOSPHATE AND BETAMETHASONE ACETATE 3; 3 MG/ML; MG/ML
6 INJECTION, SUSPENSION INTRA-ARTICULAR; INTRALESIONAL; INTRAMUSCULAR; SOFT TISSUE
Status: COMPLETED | OUTPATIENT
Start: 2020-07-20 | End: 2020-07-20

## 2020-07-20 RX ADMIN — BETAMETHASONE SODIUM PHOSPHATE AND BETAMETHASONE ACETATE 6 MG: 3; 3 INJECTION, SUSPENSION INTRA-ARTICULAR; INTRALESIONAL; INTRAMUSCULAR; SOFT TISSUE at 09:54

## 2020-07-20 NOTE — PROGRESS NOTES
Subjective: Left elbow pain     Patient ID: Luke Irizarry is a 50 y.o. male.    Chief Complaint:    History of Present Illness 50-year-old male known to me is seen today for new problem involving his left elbow which been present about 6 weeks.  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  Noticed some swelling over the elbow.  Seen in urgent care in [place] had an x-ray because of the swelling thought it might be infectious was placed on antibiotics.  Completed the antibiotics noted some decrease in the swelling but not complete resolution.  Was then seen by his primary care physician again with another round of antibiotics but again the swelling has persisted and now presents here.       Social History     Occupational History   • Not on file   Tobacco Use   • Smoking status: Former Smoker     Packs/day: 1.00     Years: 7.00     Pack years: 7.00     Types: Cigarettes     Last attempt to quit: 2006     Years since quittin.5   • Smokeless tobacco: Never Used   Substance and Sexual Activity   • Alcohol use: No   • Drug use: No   • Sexual activity: Yes     Partners: Male      Review of Systems   Constitutional: Negative for chills, diaphoresis, fever and unexpected weight change.   HENT: Negative for hearing loss, nosebleeds, sore throat and tinnitus.    Eyes: Negative for pain and visual disturbance.   Respiratory: Negative for cough, shortness of breath and wheezing.    Cardiovascular: Negative for chest pain and palpitations.   Gastrointestinal: Negative for abdominal pain, diarrhea, nausea and vomiting.   Endocrine: Negative for cold intolerance, heat intolerance and polydipsia.   Genitourinary: Negative for difficulty urinating, dysuria and hematuria.   Musculoskeletal: Positive for arthralgias.   Skin: Negative for rash and wound.   Allergic/Immunologic: Negative for environmental allergies.   Neurological: Negative  for dizziness, syncope and numbness.   Hematological: Does not bruise/bleed easily.   Psychiatric/Behavioral: Negative for dysphoric mood and sleep disturbance. The patient is not nervous/anxious.    All other systems reviewed and are negative.        Past Medical History:   Diagnosis Date   • Arthritis     RIGHT HAND   • GERD (gastroesophageal reflux disease)    • Hearing deficit    • Hypertension    • Migraines    • Right hip pain     sched BRAD   • Sleep apnea     no machine, states needs to have new study   • Spinal headache     AFTER SPINAL TAP REQUIRED BLOOD PATCH     Past Surgical History:   Procedure Laterality Date   • APPENDECTOMY     • BACK SURGERY      X 2   • CATARACT EXTRACTION      RIGHT EYE CATARACT REMOVED   • KNEE SURGERY Left 2011   • LAPAROSCOPIC CHOLECYSTECTOMY     • SHOULDER SURGERY Bilateral     X 2 Dr. Houston   • THYROIDECTOMY, PARTIAL     • TOTAL HIP ARTHROPLASTY Left 6/21/2016    Procedure: TOTAL HIP ARTHROPLASTY;  Surgeon: Sanchez Goel MD;  Location: [facility] OR;  Service:    • TOTAL HIP ARTHROPLASTY Right 8/16/2016    Procedure: TOTAL HIP ARTHROPLASTY depuy summit 7fr hemovac drain placement;  Surgeon: Sanchez Goel MD;  Location: [facility] OR;  Service:    • WRIST SURGERY Left      Family History   Problem Relation Age of Onset   • Lung disease Father    • COPD Father    • Cancer Sister    • Cancer Paternal Aunt    • Diabetes Maternal Grandmother    • Diabetes Maternal Grandfather    • Diabetes Paternal Grandmother    • Diabetes Paternal Grandfather          Objective:  There were no vitals filed for this visit.  There were no vitals filed for this visit.  There is no height or weight on file to calculate BMI.        Ortho Exam  AP and lateral x-rays done at the urgent care reviewed by me and completed within normal limits showing no acute or chronic changes.  No prior x-rays available for comparison.  He is alert and oriented x3.  The left elbow does show some swelling over the olecranon  bursa but there is no erythema.  There is some tenderness to touch again there is no bone spur noted on x-ray.  He has full range of motion of the elbow 5 flexion extension pronation supination.  He does describe some paresthesia extending down to the level of the wrist along the ulnar border but nothing into the hand.  Is good distal pulses there is no motor or sensory deficit the skin is cool to touch.  Again there is tenderness over the olecranon but there is no erythema.    Assessment:        1. Other chronic pain    2. Olecranon bursitis of left elbow           Plan: Over 50 minutes was spent the patient face-to-face reviewing his x-rays his history and his exam.  Believe the patient has a an acute olecranon bursitis.  After reviewing treatment options and the proceed with aspiration after sterile prep and we got 3 cc of a bloody aspirate.  I then injected 1 cc Celestone and applied a compressive Ace bandage.  Postinjection instructions discussed with the patient.  He is to continue his Voltaren which she has been previously taken and tolerated and to keep compression to that olecranon bursa removed only when bathing.  Return to see me in 3 weeks.  Answered all questions      Medium Joint Arthrocentesis: L elbow  Date/Time: 7/20/2020 9:54 AM  Consent given by: patient  Site marked: site marked  Timeout: Immediately prior to procedure a time out was called to verify the correct patient, procedure, equipment, support staff and site/side marked as required   Supporting Documentation  Indications: pain   Procedure Details  Location: elbow - L elbow  Preparation: Patient was prepped and draped in the usual sterile fashion  Needle size: 18 G  Medications administered: 6 mg betamethasone acetate-betamethasone sodium phosphate 6 (3-3) MG/ML  Aspirate amount: 3 mL  Aspirate: bloody              Work Status:    BEN query complete.    Orders:  Orders Placed This Encounter   Procedures   • Medium Joint Arthrocentesis: L  elbow       Medications:  No orders of the defined types were placed in this encounter.      Followup:  Return in about 3 weeks (around 8/10/2020).          Dictated utilizing Dragon dictation

## 2020-08-10 ENCOUNTER — OFFICE VISIT (OUTPATIENT)
Dept: ORTHOPEDIC SURGERY | Facility: CLINIC | Age: 50
End: 2020-08-10

## 2020-08-10 VITALS — HEIGHT: 72 IN | WEIGHT: 283 LBS | BODY MASS INDEX: 38.33 KG/M2

## 2020-08-10 DIAGNOSIS — M70.22 OLECRANON BURSITIS OF LEFT ELBOW: Primary | ICD-10-CM

## 2020-08-10 PROCEDURE — 99213 OFFICE O/P EST LOW 20 MIN: CPT | Performed by: ORTHOPAEDIC SURGERY

## 2020-08-10 NOTE — PROGRESS NOTES
Subjective: Olecranon bursitis left elbow     Patient ID: Luke Irizarry is a 50 y.o. male.    Chief Complaint:    History of Present Illness patient returns the left elbow is feeling much better.  The swelling has resolved.  Still some tenderness but there is no erythema no swelling no loss of motion or function       Social History     Occupational History   • Not on file   Tobacco Use   • Smoking status: Former Smoker     Packs/day: 1.00     Years: 7.00     Pack years: 7.00     Types: Cigarettes     Last attempt to quit: 2006     Years since quittin.6   • Smokeless tobacco: Never Used   Substance and Sexual Activity   • Alcohol use: No   • Drug use: Never   • Sexual activity: Yes     Partners: Male      Review of Systems   Constitutional: Negative for chills, diaphoresis, fever and unexpected weight change.   HENT: Negative for hearing loss, nosebleeds, sore throat and tinnitus.    Eyes: Negative for pain and visual disturbance.   Respiratory: Negative for cough, shortness of breath and wheezing.    Cardiovascular: Negative for chest pain and palpitations.   Gastrointestinal: Negative for abdominal pain, diarrhea, nausea and vomiting.   Endocrine: Negative for cold intolerance, heat intolerance and polydipsia.   Genitourinary: Negative for difficulty urinating, dysuria and hematuria.   Musculoskeletal: Positive for arthralgias and myalgias. Negative for joint swelling.   Skin: Negative for rash and wound.   Allergic/Immunologic: Negative for environmental allergies.   Neurological: Negative for dizziness, syncope and numbness.   Hematological: Does not bruise/bleed easily.   Psychiatric/Behavioral: Negative for dysphoric mood and sleep disturbance. The patient is not nervous/anxious.          Past Medical History:   Diagnosis Date   • Arthritis     RIGHT HAND   • GERD (gastroesophageal reflux disease)    • Hearing deficit    • Hypertension    • Migraines    • Right hip pain     sched BRAD   • Sleep apnea     no  machine, states needs to have new study   • Spinal headache     AFTER SPINAL TAP REQUIRED BLOOD PATCH     Past Surgical History:   Procedure Laterality Date   • APPENDECTOMY     • BACK SURGERY      X 2   • CATARACT EXTRACTION      RIGHT EYE CATARACT REMOVED   • KNEE SURGERY Left 2011   • LAPAROSCOPIC CHOLECYSTECTOMY     • SHOULDER SURGERY Bilateral     X 2 Dr. Houston   • THYROIDECTOMY, PARTIAL     • TOTAL HIP ARTHROPLASTY Left 6/21/2016    Procedure: TOTAL HIP ARTHROPLASTY;  Surgeon: Sanchez Goel MD;  Location:  [facility] OR;  Service:    • TOTAL HIP ARTHROPLASTY Right 8/16/2016    Procedure: TOTAL HIP ARTHROPLASTY depuy summit 7fr hemovac drain placement;  Surgeon: Sanchez Goel MD;  Location:  [facility] OR;  Service:    • WRIST SURGERY Left      Family History   Problem Relation Age of Onset   • Lung disease Father    • COPD Father    • Cancer Sister    • Cancer Paternal Aunt    • Diabetes Maternal Grandmother    • Diabetes Maternal Grandfather    • Diabetes Paternal Grandmother    • Diabetes Paternal Grandfather          Objective:  There were no vitals filed for this visit.      08/10/20  0841   Weight: 128 kg (283 lb)     Body mass index is 38.38 kg/m².        Ortho Exam   He is alert and oriented x3.  Left upper extremity has no motor or sensory deficits right radial ulnar median nerve function.  He has full range of motion of the elbows 5 flexion extension pronation supination.  There is no swelling erythema over the olecranon.  There is still some mild tenderness but again there is [place] no effusion.  Triceps and biceps function is 5/5.  Skin is cool to touch.  Tolerates the anti-inflammatory.    Assessment:        1. Olecranon bursitis of left elbow           Plan: I did advise to keep the wrap to the elbow to help decrease irritation of the bursa.  Continue the anti-inflammatory as needed.  Return to see me as needed if the fluid with the bursa should swell again.  Answered all questions.            Work  Status:    BEN query complete.    Orders:  No orders of the defined types were placed in this encounter.      Medications:  No orders of the defined types were placed in this encounter.      Followup:  Return if symptoms worsen or fail to improve.          Dictated utilizing Dragon dictation

## 2020-12-22 ENCOUNTER — APPOINTMENT (OUTPATIENT)
Dept: MRI IMAGING | Facility: HOSPITAL | Age: 50
End: 2020-12-22

## 2020-12-22 ENCOUNTER — HOSPITAL ENCOUNTER (EMERGENCY)
Facility: HOSPITAL | Age: 50
Discharge: ANOTHER HOSPITAL | End: 2020-12-23
Attending: EMERGENCY MEDICINE | Admitting: EMERGENCY MEDICINE

## 2020-12-22 DIAGNOSIS — T81.40XA POSTOPERATIVE INFECTION, UNSPECIFIED TYPE, INITIAL ENCOUNTER: ICD-10-CM

## 2020-12-22 DIAGNOSIS — G06.1 ABSCESS IN EPIDURAL SPACE OF LUMBAR SPINE: Primary | ICD-10-CM

## 2020-12-22 LAB
ALBUMIN SERPL-MCNC: 3.9 G/DL (ref 3.5–5.2)
ALBUMIN/GLOB SERPL: 1.1 G/DL
ALP SERPL-CCNC: 92 U/L (ref 39–117)
ALT SERPL W P-5'-P-CCNC: 26 U/L (ref 1–41)
ANION GAP SERPL CALCULATED.3IONS-SCNC: 9.1 MMOL/L (ref 5–15)
APTT PPP: 31.6 SECONDS (ref 24.3–38.1)
AST SERPL-CCNC: 13 U/L (ref 1–40)
BASOPHILS # BLD AUTO: 0.07 10*3/MM3 (ref 0–0.2)
BASOPHILS NFR BLD AUTO: 0.4 % (ref 0–1.5)
BILIRUB SERPL-MCNC: 0.7 MG/DL (ref 0–1.2)
BUN SERPL-MCNC: 11 MG/DL (ref 6–20)
BUN/CREAT SERPL: 11.6 (ref 7–25)
CALCIUM SPEC-SCNC: 8.9 MG/DL (ref 8.6–10.5)
CHLORIDE SERPL-SCNC: 97 MMOL/L (ref 98–107)
CO2 SERPL-SCNC: 27.9 MMOL/L (ref 22–29)
CREAT SERPL-MCNC: 0.95 MG/DL (ref 0.76–1.27)
CRP SERPL-MCNC: 20.15 MG/DL (ref 0–0.5)
D-LACTATE SERPL-SCNC: 1.7 MMOL/L (ref 0.5–2)
DEPRECATED RDW RBC AUTO: 41.4 FL (ref 37–54)
EOSINOPHIL # BLD AUTO: 0.15 10*3/MM3 (ref 0–0.4)
EOSINOPHIL NFR BLD AUTO: 0.9 % (ref 0.3–6.2)
ERYTHROCYTE [DISTWIDTH] IN BLOOD BY AUTOMATED COUNT: 12.9 % (ref 12.3–15.4)
ERYTHROCYTE [SEDIMENTATION RATE] IN BLOOD: 40 MM/HR (ref 0–20)
GFR SERPL CREATININE-BSD FRML MDRD: 84 ML/MIN/1.73
GLOBULIN UR ELPH-MCNC: 3.7 GM/DL
GLUCOSE SERPL-MCNC: 155 MG/DL (ref 65–99)
HCT VFR BLD AUTO: 45.3 % (ref 37.5–51)
HGB BLD-MCNC: 14.6 G/DL (ref 13–17.7)
IMM GRANULOCYTES # BLD AUTO: 0.05 10*3/MM3 (ref 0–0.05)
IMM GRANULOCYTES NFR BLD AUTO: 0.3 % (ref 0–0.5)
INR PPP: 1.12 (ref 0.9–1.1)
LYMPHOCYTES # BLD AUTO: 3 10*3/MM3 (ref 0.7–3.1)
LYMPHOCYTES NFR BLD AUTO: 17.3 % (ref 19.6–45.3)
MCH RBC QN AUTO: 27.9 PG (ref 26.6–33)
MCHC RBC AUTO-ENTMCNC: 32.2 G/DL (ref 31.5–35.7)
MCV RBC AUTO: 86.6 FL (ref 79–97)
MONOCYTES # BLD AUTO: 1.31 10*3/MM3 (ref 0.1–0.9)
MONOCYTES NFR BLD AUTO: 7.5 % (ref 5–12)
NEUTROPHILS NFR BLD AUTO: 12.8 10*3/MM3 (ref 1.7–7)
NEUTROPHILS NFR BLD AUTO: 73.6 % (ref 42.7–76)
PLATELET # BLD AUTO: 305 10*3/MM3 (ref 140–450)
PMV BLD AUTO: 10.4 FL (ref 6–12)
POTASSIUM SERPL-SCNC: 3.7 MMOL/L (ref 3.5–5.2)
PROCALCITONIN SERPL-MCNC: 0.08 NG/ML (ref 0–0.25)
PROT SERPL-MCNC: 7.6 G/DL (ref 6–8.5)
PROTHROMBIN TIME: 14.1 SECONDS (ref 12.1–15)
RBC # BLD AUTO: 5.23 10*6/MM3 (ref 4.14–5.8)
SODIUM SERPL-SCNC: 134 MMOL/L (ref 136–145)
WBC # BLD AUTO: 17.38 10*3/MM3 (ref 3.4–10.8)

## 2020-12-22 PROCEDURE — 83605 ASSAY OF LACTIC ACID: CPT | Performed by: EMERGENCY MEDICINE

## 2020-12-22 PROCEDURE — A9577 INJ MULTIHANCE: HCPCS | Performed by: EMERGENCY MEDICINE

## 2020-12-22 PROCEDURE — 85652 RBC SED RATE AUTOMATED: CPT | Performed by: EMERGENCY MEDICINE

## 2020-12-22 PROCEDURE — 96375 TX/PRO/DX INJ NEW DRUG ADDON: CPT

## 2020-12-22 PROCEDURE — 72158 MRI LUMBAR SPINE W/O & W/DYE: CPT

## 2020-12-22 PROCEDURE — 25010000002 VANCOMYCIN HCL 1500 MG/300ML SOLUTION: Performed by: EMERGENCY MEDICINE

## 2020-12-22 PROCEDURE — 25010000002 ONDANSETRON PER 1 MG: Performed by: EMERGENCY MEDICINE

## 2020-12-22 PROCEDURE — 25010000002 HYDROMORPHONE PER 4 MG: Performed by: EMERGENCY MEDICINE

## 2020-12-22 PROCEDURE — 85025 COMPLETE CBC W/AUTO DIFF WBC: CPT | Performed by: EMERGENCY MEDICINE

## 2020-12-22 PROCEDURE — 86140 C-REACTIVE PROTEIN: CPT | Performed by: EMERGENCY MEDICINE

## 2020-12-22 PROCEDURE — 99284 EMERGENCY DEPT VISIT MOD MDM: CPT

## 2020-12-22 PROCEDURE — 85610 PROTHROMBIN TIME: CPT | Performed by: EMERGENCY MEDICINE

## 2020-12-22 PROCEDURE — 85730 THROMBOPLASTIN TIME PARTIAL: CPT | Performed by: EMERGENCY MEDICINE

## 2020-12-22 PROCEDURE — 80053 COMPREHEN METABOLIC PANEL: CPT | Performed by: EMERGENCY MEDICINE

## 2020-12-22 PROCEDURE — 99284 EMERGENCY DEPT VISIT MOD MDM: CPT | Performed by: EMERGENCY MEDICINE

## 2020-12-22 PROCEDURE — 84145 PROCALCITONIN (PCT): CPT | Performed by: EMERGENCY MEDICINE

## 2020-12-22 PROCEDURE — 96374 THER/PROPH/DIAG INJ IV PUSH: CPT

## 2020-12-22 PROCEDURE — 0 GADOBENATE DIMEGLUMINE 529 MG/ML SOLUTION: Performed by: EMERGENCY MEDICINE

## 2020-12-22 RX ORDER — IBUPROFEN 800 MG/1
800 TABLET ORAL EVERY 6 HOURS PRN
COMMUNITY
End: 2021-08-23

## 2020-12-22 RX ORDER — SODIUM CHLORIDE 0.9 % (FLUSH) 0.9 %
10 SYRINGE (ML) INJECTION AS NEEDED
Status: DISCONTINUED | OUTPATIENT
Start: 2020-12-22 | End: 2020-12-23 | Stop reason: HOSPADM

## 2020-12-22 RX ORDER — VANCOMYCIN 1.5 G/300ML
1500 INJECTION, SOLUTION INTRAVENOUS ONCE
Status: COMPLETED | OUTPATIENT
Start: 2020-12-22 | End: 2020-12-22

## 2020-12-22 RX ORDER — HYDROMORPHONE HCL 110MG/55ML
1 PATIENT CONTROLLED ANALGESIA SYRINGE INTRAVENOUS ONCE
Status: COMPLETED | OUTPATIENT
Start: 2020-12-22 | End: 2020-12-22

## 2020-12-22 RX ORDER — ONDANSETRON 2 MG/ML
4 INJECTION INTRAMUSCULAR; INTRAVENOUS ONCE
Status: COMPLETED | OUTPATIENT
Start: 2020-12-22 | End: 2020-12-22

## 2020-12-22 RX ORDER — ACETAMINOPHEN 500 MG
1000 TABLET ORAL ONCE
Status: COMPLETED | OUTPATIENT
Start: 2020-12-22 | End: 2020-12-22

## 2020-12-22 RX ORDER — CYCLOBENZAPRINE HCL 10 MG
10 TABLET ORAL 3 TIMES DAILY PRN
COMMUNITY
End: 2021-10-28

## 2020-12-22 RX ADMIN — ONDANSETRON 4 MG: 2 INJECTION, SOLUTION INTRAMUSCULAR; INTRAVENOUS at 16:26

## 2020-12-22 RX ADMIN — VANCOMYCIN 1500 MG: 1.5 INJECTION, SOLUTION INTRAVENOUS at 20:26

## 2020-12-22 RX ADMIN — HYDROMORPHONE HYDROCHLORIDE 1 MG: 2 INJECTION, SOLUTION INTRAMUSCULAR; INTRAVENOUS; SUBCUTANEOUS at 16:27

## 2020-12-22 RX ADMIN — ACETAMINOPHEN 1000 MG: 500 TABLET ORAL at 20:34

## 2020-12-22 RX ADMIN — GADOBENATE DIMEGLUMINE 20 ML: 529 INJECTION, SOLUTION INTRAVENOUS at 18:24

## 2020-12-22 NOTE — ED PROVIDER NOTES
"Subjective   History of Present Illness  History of Present Illness    Chief complaint: Back pain, swollen \"knot\", fevers    Location: Lower midline back    Quality/Severity: Moderate to severe pain    Timing/Duration: Worsening over the past couple days    Modifying Factors: Worse with movements or activities    Narrative: This patient presents for evaluation of worsening pain and a swollen knot in his lumbar spine area.  He recently had an L3-L4 discectomy and durotomy repair on December 7.  This was performed by Dr. Vladislav Blakely, neurosurgery.  Patient had been recovering well up until the past few days.  That is when he developed the pain and also the swelling.  He also has developed some fever since then.  He tried to call his neurosurgery provider but they have not called him back yet today.  He is not currently taking any antibiotics.  He has been taking his prescription pain medications as directed but that does not seem to be controlling his pain so far.    Associated Symptoms: As above    Review of Systems   Constitutional: Positive for fever. Negative for activity change.   HENT: Negative.    Respiratory: Negative for cough and shortness of breath.    Cardiovascular: Negative for chest pain.   Gastrointestinal: Positive for abdominal pain (LLQ area only) and constipation. Negative for vomiting.   Genitourinary: Negative for dysuria, enuresis, flank pain, frequency, hematuria and urgency.   Musculoskeletal: Positive for back pain and myalgias.   Skin: Positive for wound. Negative for color change.   Neurological: Negative for syncope and headaches.   All other systems reviewed and are negative.      Past Medical History:   Diagnosis Date   • Arthritis     RIGHT HAND   • GERD (gastroesophageal reflux disease)    • Hearing deficit    • Hypertension    • Migraines    • Right hip pain     sched BRAD   • Sleep apnea     no machine, states needs to have new study   • Spinal headache     AFTER SPINAL TAP REQUIRED " BLOOD PATCH       No Known Allergies    Past Surgical History:   Procedure Laterality Date   • APPENDECTOMY     • BACK SURGERY      X 2   • BACK SURGERY  2020   • CATARACT EXTRACTION      RIGHT EYE CATARACT REMOVED   • KNEE SURGERY Left    • LAPAROSCOPIC CHOLECYSTECTOMY     • SHOULDER SURGERY Bilateral     X 2 Dr. Houston   • THYROIDECTOMY, PARTIAL     • TOTAL HIP ARTHROPLASTY Left 2016    Procedure: TOTAL HIP ARTHROPLASTY;  Surgeon: Sanchez Goel MD;  Location: [facility] OR;  Service:    • TOTAL HIP ARTHROPLASTY Right 2016    Procedure: TOTAL HIP ARTHROPLASTY depuy summit 7fr hemovac drain placement;  Surgeon: Sanchez Goel MD;  Location:  [facility] OR;  Service:    • WRIST SURGERY Left        Family History   Problem Relation Age of Onset   • Lung disease Father    • COPD Father    • Cancer Sister    • Cancer Paternal Aunt    • Diabetes Maternal Grandmother    • Diabetes Maternal Grandfather    • Diabetes Paternal Grandmother    • Diabetes Paternal Grandfather        Social History     Socioeconomic History   • Marital status:      Spouse name: Not on file   • Number of children: Not on file   • Years of education: Not on file   • Highest education level: Not on file   Tobacco Use   • Smoking status: Former Smoker     Packs/day: 1.00     Years: 7.00     Pack years: 7.00     Types: Cigarettes     Quit date:      Years since quittin.9   • Smokeless tobacco: Never Used   Substance and Sexual Activity   • Alcohol use: No   • Drug use: Never   • Sexual activity: Yes     Partners: Male     ED Triage Vitals [20 1527]   Temp Heart Rate Resp BP SpO2   99.3 °F (37.4 °C) 88 20 137/84 97 %      Temp src Heart Rate Source Patient Position BP Location FiO2 (%)   Oral Monitor Sitting Left arm --     Objective   Physical Exam  Vitals signs and nursing note reviewed.   Constitutional:       Appearance: He is well-developed.   HENT:      Head: Normocephalic and atraumatic.   Eyes:      General:          Right eye: No discharge.         Left eye: No discharge.      Pupils: Pupils are equal, round, and reactive to light.   Neck:      Musculoskeletal: Normal range of motion and neck supple.   Cardiovascular:      Rate and Rhythm: Normal rate and regular rhythm.   Pulmonary:      Effort: Pulmonary effort is normal. No respiratory distress.   Musculoskeletal: Normal range of motion.         General: No deformity.   Skin:     General: Skin is warm and dry.      Findings: No erythema or rash.   Neurological:      Mental Status: He is alert and oriented to person, place, and time.   Psychiatric:         Behavior: Behavior normal.         Thought Content: Thought content normal.         Judgment: Judgment normal.       Results for orders placed or performed during the hospital encounter of 12/22/20   Comprehensive Metabolic Panel    Specimen: Blood   Result Value Ref Range    Glucose 155 (H) 65 - 99 mg/dL    BUN 11 6 - 20 mg/dL    Creatinine 0.95 0.76 - 1.27 mg/dL    Sodium 134 (L) 136 - 145 mmol/L    Potassium 3.7 3.5 - 5.2 mmol/L    Chloride 97 (L) 98 - 107 mmol/L    CO2 27.9 22.0 - 29.0 mmol/L    Calcium 8.9 8.6 - 10.5 mg/dL    Total Protein 7.6 6.0 - 8.5 g/dL    Albumin 3.90 3.50 - 5.20 g/dL    ALT (SGPT) 26 1 - 41 U/L    AST (SGOT) 13 1 - 40 U/L    Alkaline Phosphatase 92 39 - 117 U/L    Total Bilirubin 0.7 0.0 - 1.2 mg/dL    eGFR Non African Amer 84 >60 mL/min/1.73    Globulin 3.7 gm/dL    A/G Ratio 1.1 g/dL    BUN/Creatinine Ratio 11.6 7.0 - 25.0    Anion Gap 9.1 5.0 - 15.0 mmol/L   aPTT    Specimen: Blood   Result Value Ref Range    PTT 31.6 24.3 - 38.1 seconds   Protime-INR    Specimen: Blood   Result Value Ref Range    Protime 14.1 12.1 - 15.0 Seconds    INR 1.12 (H) 0.90 - 1.10   Sedimentation Rate    Specimen: Blood   Result Value Ref Range    Sed Rate 40 (H) 0 - 20 mm/hr   C-reactive Protein    Specimen: Blood   Result Value Ref Range    C-Reactive Protein 20.15 (H) 0.00 - 0.50 mg/dL   CBC Auto  Differential    Specimen: Blood   Result Value Ref Range    WBC 17.38 (H) 3.40 - 10.80 10*3/mm3    RBC 5.23 4.14 - 5.80 10*6/mm3    Hemoglobin 14.6 13.0 - 17.7 g/dL    Hematocrit 45.3 37.5 - 51.0 %    MCV 86.6 79.0 - 97.0 fL    MCH 27.9 26.6 - 33.0 pg    MCHC 32.2 31.5 - 35.7 g/dL    RDW 12.9 12.3 - 15.4 %    RDW-SD 41.4 37.0 - 54.0 fl    MPV 10.4 6.0 - 12.0 fL    Platelets 305 140 - 450 10*3/mm3    Neutrophil % 73.6 42.7 - 76.0 %    Lymphocyte % 17.3 (L) 19.6 - 45.3 %    Monocyte % 7.5 5.0 - 12.0 %    Eosinophil % 0.9 0.3 - 6.2 %    Basophil % 0.4 0.0 - 1.5 %    Immature Grans % 0.3 0.0 - 0.5 %    Neutrophils, Absolute 12.80 (H) 1.70 - 7.00 10*3/mm3    Lymphocytes, Absolute 3.00 0.70 - 3.10 10*3/mm3    Monocytes, Absolute 1.31 (H) 0.10 - 0.90 10*3/mm3    Eosinophils, Absolute 0.15 0.00 - 0.40 10*3/mm3    Basophils, Absolute 0.07 0.00 - 0.20 10*3/mm3    Immature Grans, Absolute 0.05 0.00 - 0.05 10*3/mm3   Lactic Acid, Plasma    Specimen: Blood   Result Value Ref Range    Lactate 1.7 0.5 - 2.0 mmol/L   Procalcitonin    Specimen: Blood   Result Value Ref Range    Procalcitonin 0.08 0.00 - 0.25 ng/mL       RADIOLOGY        Study: MRI lumbar spine    Findings: There are 5 lumbar-type vertebral bodies. There is posterior spinal fusion extending from L4 through S1 with disc spacers at L4-5 and L5-S1. There is a significant amount of edema and enhancement in the paraspinal soft tissues at the operative site.  Fluid collection involves the laminectomy site at L3-4 and enters the posterior spinal canal extending to the left. This is associated with abnormal enhancement and there is effacement of the left lateral recess. Enhancement also extends to the left L3-4  neural foramen. Collection measures approximately 2.9 x 0.5 cm. There is a more superficial fluid collection measures approximately 3.7 x 4.3 cm, that appears contiguous with this collection at the laminectomy site, and this appears to potentially extend  to the skin  surface. There is potential mass effect on the left-sided nerve roots at the L3-4 level.This fluid collection is contributing to probable moderate to moderately severe central spinal stenosis. No other definite areas of high-grade stenosis of  the abdomen may be some residual moderate left neural foraminal narrowing at L5-S1.      IMPRESSION:    There is multilevel postoperative change as described above. A fluid collection is seen at the laminectomy site at L3-4 extending into the posterior canal eccentric to the left and effacing the left lateral recess. This is associated with abnormal  enhancement. Abnormal enhancement also appears to extend into the proximal left L3-4 neural foramen. Given the patient's history, these findings are concerning for a potential small epidural abscess/phlegmon. This collection is also contiguous with aq  more superficial collection that potentially extends to the skin surface. No prior imaging is available for comparison. Consider obtaining priors if available for further review. Significant edema and enhancement is also seen in the paraspinal soft  tissues, and it is uncertain if this may represent cellulitis.    Interpreted contemporaneously with treatment by Dr. Armas, independently viewed by me    Procedures           ED Course  ED Course as of Dec 22 2142   Tue Dec 22, 2020   2140 I have reviewed the labs and the MRI report from [facility]'s visit.  Patient developed a fever after a couple of hours here as well.  All of the findings and physical exam features are strongly suggestive of a postoperative infection.  MRI indicates that an epidural abscess may exist.  I spoke with Alyson who was the on-call practitioner for the neurosurgical team [facility].  She requested that we transfer this patient to [facility] for further management and likely operative drainage of the wound site tomorrow.  I then called the [place] access center and, unfortunately, we had to wait for a  very long time before the hospitalist team would call us back.  After waiting over an hour and a half, the hospitalist did finally call us back and accepted the patient in transfer.  We are now awaiting a bed assignment at [facility].  Patient remained stable here at this time.  We have treated his fever.  I gave him a dose of vancomycin per Alyson's request.  I am allowing him to eat a meal tonight as I suspect he will probably be n.p.o. after midnight.  I have explained the plan of care to him and he is in agreement with transfer via ambulance to [facility] as soon as a bed is available.    [KINGSLEY]      ED Course User Index  [KINGSLEY] Steven Mock MD                                           MDM  Number of Diagnoses or Management Options  Abscess in epidural space of lumbar spine:   Postoperative infection, unspecified type, initial encounter:      Amount and/or Complexity of Data Reviewed  Clinical lab tests: reviewed and ordered  Tests in the radiology section of CPT®: reviewed and ordered  Decide to obtain previous medical records or to obtain history from someone other than the patient: yes  Review and summarize past medical records: yes  Discuss the patient with other providers: yes (Joleen, practitioner for neurosurgery  Corinna, practitioner for hospitalist)  Independent visualization of images, tracings, or specimens: yes    Risk of Complications, Morbidity, and/or Mortality  Presenting problems: high  Diagnostic procedures: moderate  Management options: high        Final diagnoses:   Abscess in epidural space of lumbar spine   Postoperative infection, unspecified type, initial encounter            Steven Mock MD  12/22/20 7583

## 2020-12-23 VITALS
WEIGHT: 300 LBS | DIASTOLIC BLOOD PRESSURE: 78 MMHG | BODY MASS INDEX: 40.63 KG/M2 | HEIGHT: 72 IN | SYSTOLIC BLOOD PRESSURE: 134 MMHG | HEART RATE: 80 BPM | OXYGEN SATURATION: 96 % | TEMPERATURE: 99.7 F | RESPIRATION RATE: 16 BRPM

## 2020-12-23 PROCEDURE — 96376 TX/PRO/DX INJ SAME DRUG ADON: CPT

## 2020-12-23 PROCEDURE — 25010000002 VANCOMYCIN HCL 1500 MG/300ML SOLUTION: Performed by: EMERGENCY MEDICINE

## 2020-12-23 PROCEDURE — 25010000002 KETOROLAC TROMETHAMINE PER 15 MG: Performed by: EMERGENCY MEDICINE

## 2020-12-23 PROCEDURE — 96375 TX/PRO/DX INJ NEW DRUG ADDON: CPT

## 2020-12-23 RX ORDER — KETOROLAC TROMETHAMINE 30 MG/ML
30 INJECTION, SOLUTION INTRAMUSCULAR; INTRAVENOUS ONCE
Status: COMPLETED | OUTPATIENT
Start: 2020-12-23 | End: 2020-12-23

## 2020-12-23 RX ORDER — VANCOMYCIN 1.5 G/300ML
1500 INJECTION, SOLUTION INTRAVENOUS ONCE
Status: COMPLETED | OUTPATIENT
Start: 2020-12-23 | End: 2020-12-23

## 2020-12-23 RX ADMIN — VANCOMYCIN 1500 MG: 1.5 INJECTION, SOLUTION INTRAVENOUS at 07:17

## 2020-12-23 RX ADMIN — KETOROLAC TROMETHAMINE 30 MG: 30 INJECTION, SOLUTION INTRAMUSCULAR at 03:34

## 2020-12-23 NOTE — ED NOTES
Third call placed to [facility].  Dr. Mock talked with intake to state the importance of this pt getting transferred [facility].       Puja Lagunas  12/22/20 9506

## 2020-12-23 NOTE — ED NOTES
Out call to Dr Blakely per Dr Alaniz request.  Left a number for call back with answering service Inga Lenz RN  12/23/20 1411

## 2020-12-23 NOTE — ED NOTES
Alyson Sherman from [facility] Neurology called back. Spoke with Dr. Alaniz call Daina Hurtado  12/23/20 5986

## 2020-12-23 NOTE — ED PROVIDER NOTES
Subjective   History of Present Illness    Review of Systems    Past Medical History:   Diagnosis Date   • Arthritis     RIGHT HAND   • GERD (gastroesophageal reflux disease)    • Hearing deficit    • Hypertension    • Migraines    • Right hip pain     sched BRAD   • Sleep apnea     no machine, states needs to have new study   • Spinal headache     AFTER SPINAL TAP REQUIRED BLOOD PATCH       No Known Allergies    Past Surgical History:   Procedure Laterality Date   • APPENDECTOMY     • BACK SURGERY      X 2   • BACK SURGERY  2020   • CATARACT EXTRACTION      RIGHT EYE CATARACT REMOVED   • KNEE SURGERY Left    • LAPAROSCOPIC CHOLECYSTECTOMY     • SHOULDER SURGERY Bilateral     X 2 Dr. Houston   • THYROIDECTOMY, PARTIAL     • TOTAL HIP ARTHROPLASTY Left 2016    Procedure: TOTAL HIP ARTHROPLASTY;  Surgeon: Sanchez Goel MD;  Location: [facility] OR;  Service:    • TOTAL HIP ARTHROPLASTY Right 2016    Procedure: TOTAL HIP ARTHROPLASTY depuy summit 7fr hemovac drain placement;  Surgeon: Sanchez Goel MD;  Location: [facility] OR;  Service:    • WRIST SURGERY Left        Family History   Problem Relation Age of Onset   • Lung disease Father    • COPD Father    • Cancer Sister    • Cancer Paternal Aunt    • Diabetes Maternal Grandmother    • Diabetes Maternal Grandfather    • Diabetes Paternal Grandmother    • Diabetes Paternal Grandfather        Social History     Socioeconomic History   • Marital status:      Spouse name: Not on file   • Number of children: Not on file   • Years of education: Not on file   • Highest education level: Not on file   Tobacco Use   • Smoking status: Former Smoker     Packs/day: 1.00     Years: 7.00     Pack years: 7.00     Types: Cigarettes     Quit date:      Years since quittin.9   • Smokeless tobacco: Never Used   Substance and Sexual Activity   • Alcohol use: No   • Drug use: Never   • Sexual activity: Yes     Partners: Male           Objective   Physical  Exam    Procedures           ED Course  ED Course as of Dec 23 0716   Tue Dec 22, 2020   2140 I have reviewed the labs and the MRI report from [facility]'s visit.  Patient developed a fever after a couple of hours here as well.  All of the findings and physical exam features are strongly suggestive of a postoperative infection.  MRI indicates that an epidural abscess may exist.  I spoke with Alyson who was the on-call practitioner for the neurosurgical team [facility].  She requested that we transfer this patient to [facility] for further management and likely operative drainage of the wound site tomorrow.  I then called the [facility] and, unfortunately, we had to wait for a very long time before the hospitalist team would call us back.  After waiting over an hour and a half, the hospitalist did finally call us back and accepted the patient in transfer.  We are now awaiting a bed assignment at [facility].  Patient remained stable here at this time.  We have treated his fever.  I gave him a dose of vancomycin per Alyson's request.  I am allowing him to eat a meal tonight as I suspect he will probably be n.p.o. after midnight.  I have explained the plan of care to him and he is in agreement with transfer via ambulance to [facility] as soon as a bed is available.    [KINGSLEY]   Wed Dec 23, 2020   0713 I assumed care for the patient from Dr. Mock at 22:30.    [TP]   0714 06: 30 [facility] still stated they had no beds available for the patient and that the patient's neurosurgeons were deciding what to do.    [TP]   0715 07:13 I spoke with Alyson Sherman, covering for Dr. Vladislav Blakely the patient's neurosurgeon, who stated she was still trying to get the patient to [facility].    [TP]   0715 The patient will be administered his second dose of vancomycin 1.5 g IV.    [TP]   0716 07:30 care transferred to Dr. Busch.    [TP]      ED Course User Index  [KINGSLEY]  Steven Mock MD  [TP] Jose L Alaniz MD                                           [facility]    Final diagnoses:   Abscess in epidural space of lumbar spine   Postoperative infection, unspecified type, initial encounter            Jose L Alaniz MD  12/23/20 0716

## 2020-12-23 NOTE — ED NOTES
Received call from [facility] stating no bed at this time.     Peggy Mosqueda, RN  12/23/20 0100

## 2020-12-23 NOTE — ED NOTES
Spoke with [facility]. States no beds available at [facility]. They will notify us when a bed is available.     Peggy Mosqueda, RN  12/22/20 4000

## 2020-12-23 NOTE — ED NOTES
Call placed to [facility] for an update on bed status. Still no beds at this time.      Daina Ochoa  12/23/20 7826

## 2020-12-23 NOTE — ED NOTES
"Pt stated \"I feel better than I did yesterday and that knot on my back has gone down\"     Inga Mcgregor RN  12/23/20 1012    "

## 2021-06-18 ENCOUNTER — OFFICE VISIT (OUTPATIENT)
Dept: ORTHOPEDIC SURGERY | Facility: CLINIC | Age: 51
End: 2021-06-18

## 2021-06-18 VITALS
BODY MASS INDEX: 39.96 KG/M2 | HEART RATE: 85 BPM | DIASTOLIC BLOOD PRESSURE: 85 MMHG | SYSTOLIC BLOOD PRESSURE: 121 MMHG | HEIGHT: 72 IN | WEIGHT: 295 LBS

## 2021-06-18 DIAGNOSIS — S80.12XA CONTUSION OF LEFT LOWER EXTREMITY, INITIAL ENCOUNTER: ICD-10-CM

## 2021-06-18 DIAGNOSIS — S83.241A ACUTE MEDIAL MENISCUS TEAR OF RIGHT KNEE, INITIAL ENCOUNTER: ICD-10-CM

## 2021-06-18 DIAGNOSIS — S83.511A RUPTURE OF ANTERIOR CRUCIATE LIGAMENT OF RIGHT KNEE, INITIAL ENCOUNTER: ICD-10-CM

## 2021-06-18 DIAGNOSIS — M25.561 ACUTE PAIN OF RIGHT KNEE: Primary | ICD-10-CM

## 2021-06-18 PROCEDURE — 99214 OFFICE O/P EST MOD 30 MIN: CPT | Performed by: ORTHOPAEDIC SURGERY

## 2021-06-18 PROCEDURE — 73562 X-RAY EXAM OF KNEE 3: CPT | Performed by: ORTHOPAEDIC SURGERY

## 2021-06-18 RX ORDER — TRAMADOL HYDROCHLORIDE 50 MG/1
TABLET ORAL
COMMUNITY
Start: 2021-06-16 | End: 2021-08-23

## 2021-06-18 RX ORDER — FUROSEMIDE 20 MG/1
TABLET ORAL
COMMUNITY
Start: 2021-03-23 | End: 2021-09-09

## 2021-06-18 RX ORDER — PRENATAL VIT 91/IRON/FOLIC/DHA 28-975-200
COMBINATION PACKAGE (EA) ORAL
COMMUNITY
Start: 2021-06-06 | End: 2021-09-09

## 2021-06-18 RX ORDER — DOXYCYCLINE HYCLATE 100 MG/1
CAPSULE ORAL
COMMUNITY
Start: 2021-04-06 | End: 2021-08-23
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
## 2021-07-15 ENCOUNTER — HOSPITAL ENCOUNTER (OUTPATIENT)
Dept: MRI IMAGING | Facility: HOSPITAL | Age: 51
Discharge: HOME | End: 2021-07-15
Admitting: ORTHOPAEDIC SURGERY

## 2021-07-15 DIAGNOSIS — M25.561 ACUTE PAIN OF RIGHT KNEE: ICD-10-CM

## 2021-07-15 DIAGNOSIS — S83.511A RUPTURE OF ANTERIOR CRUCIATE LIGAMENT OF RIGHT KNEE, INITIAL ENCOUNTER: ICD-10-CM

## 2021-07-15 DIAGNOSIS — S83.241A ACUTE MEDIAL MENISCUS TEAR OF RIGHT KNEE, INITIAL ENCOUNTER: ICD-10-CM

## 2021-07-15 PROCEDURE — 73721 MRI JNT OF LWR EXTRE W/O DYE: CPT

## 2021-07-21 ENCOUNTER — OFFICE VISIT (OUTPATIENT)
Dept: ORTHOPEDIC SURGERY | Facility: CLINIC | Age: 51
End: 2021-07-21

## 2021-07-21 VITALS — WEIGHT: 295 LBS | BODY MASS INDEX: 39.96 KG/M2 | HEIGHT: 72 IN

## 2021-07-21 DIAGNOSIS — S83.521A RUPTURE OF POSTERIOR CRUCIATE LIGAMENT OF RIGHT KNEE, INITIAL ENCOUNTER: Primary | ICD-10-CM

## 2021-07-21 DIAGNOSIS — M25.551 RIGHT HIP PAIN: ICD-10-CM

## 2021-07-21 DIAGNOSIS — T79.A22D TRAUMATIC COMPARTMENT SYNDROME OF LEFT LOWER EXTREMITY, SUBSEQUENT ENCOUNTER: ICD-10-CM

## 2021-07-21 DIAGNOSIS — S83.411A TEAR OF MEDIAL COLLATERAL LIGAMENT OF RIGHT KNEE, INITIAL ENCOUNTER: ICD-10-CM

## 2021-07-21 DIAGNOSIS — Z96.643 STATUS POST TOTAL REPLACEMENT OF BOTH HIPS: ICD-10-CM

## 2021-07-21 PROCEDURE — 99213 OFFICE O/P EST LOW 20 MIN: CPT | Performed by: ORTHOPAEDIC SURGERY
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
## 2021-08-16 ENCOUNTER — APPOINTMENT (OUTPATIENT)
Dept: MRI IMAGING | Facility: HOSPITAL | Age: 51
End: 2021-08-16

## 2021-08-23 ENCOUNTER — OFFICE VISIT (OUTPATIENT)
Dept: ORTHOPEDIC SURGERY | Facility: CLINIC | Age: 51
End: 2021-08-23

## 2021-08-23 VITALS — WEIGHT: 295 LBS | BODY MASS INDEX: 39.96 KG/M2 | HEIGHT: 72 IN

## 2021-08-23 DIAGNOSIS — Z96.643 STATUS POST TOTAL REPLACEMENT OF BOTH HIPS: ICD-10-CM

## 2021-08-23 DIAGNOSIS — S63.641A GAMEKEEPER'S THUMB OF RIGHT HAND, INITIAL ENCOUNTER: ICD-10-CM

## 2021-08-23 DIAGNOSIS — M79.644 PAIN OF RIGHT THUMB: Primary | ICD-10-CM

## 2021-08-23 DIAGNOSIS — S83.521A RUPTURE OF POSTERIOR CRUCIATE LIGAMENT OF RIGHT KNEE, INITIAL ENCOUNTER: ICD-10-CM

## 2021-08-23 DIAGNOSIS — M25.551 RIGHT HIP PAIN: ICD-10-CM

## 2021-08-23 DIAGNOSIS — S83.411A TEAR OF MEDIAL COLLATERAL LIGAMENT OF RIGHT KNEE, INITIAL ENCOUNTER: ICD-10-CM

## 2021-08-23 DIAGNOSIS — T79.A22D TRAUMATIC COMPARTMENT SYNDROME OF LEFT LOWER EXTREMITY, SUBSEQUENT ENCOUNTER: ICD-10-CM

## 2021-08-23 PROCEDURE — 73140 X-RAY EXAM OF FINGER(S): CPT | Performed by: ORTHOPAEDIC SURGERY

## 2021-08-23 PROCEDURE — 99214 OFFICE O/P EST MOD 30 MIN: CPT | Performed by: ORTHOPAEDIC SURGERY

## 2021-08-23 RX ORDER — LORATADINE 10 MG/1
TABLET ORAL
COMMUNITY
Start: 2021-06-16 | End: 2021-09-09

## 2021-08-23 NOTE — PROGRESS NOTES
Subjective: Right knee pain, left hip pain right thumb pain     Patient ID: Luke Irizarry is a 51 y.o. male.    Chief Complaint:    History of Present Illness patient returns for his follow-up regarding the right knee.  Still having moderate pain discomfort and feeling of instability.  MRI of the hip with tibial performed on September 9.  Also today complaining of some pain that he has had since the injury was initially evaluated at the [facility] told he had a fracture was placed in a splint but he still has moderate pain and discomfort cannot  or hold anything.       Social History     Occupational History   • Not on file   Tobacco Use   • Smoking status: Former Smoker     Packs/day: 1.00     Years: 7.00     Pack years: 7.00     Types: Cigarettes     Quit date: 2006     Years since quitting: 15.6   • Smokeless tobacco: Never Used   Vaping Use   • Vaping Use: Never used   Substance and Sexual Activity   • Alcohol use: No   • Drug use: Never   • Sexual activity: Yes     Partners: Male      Review of Systems   Constitutional: Negative for chills, diaphoresis, fever and unexpected weight change.   HENT: Negative for hearing loss, nosebleeds, sore throat and tinnitus.    Eyes: Negative for pain and visual disturbance.   Respiratory: Negative for cough, shortness of breath and wheezing.    Cardiovascular: Negative for chest pain and palpitations.   Gastrointestinal: Negative for abdominal pain, diarrhea, nausea and vomiting.   Endocrine: Negative for cold intolerance, heat intolerance and polydipsia.   Genitourinary: Negative for difficulty urinating, dysuria and hematuria.   Musculoskeletal: Positive for arthralgias and myalgias.   Skin: Negative for rash and wound.   Allergic/Immunologic: Negative for environmental allergies.   Neurological: Negative for dizziness, syncope and numbness.   Hematological: Does not bruise/bleed easily.   Psychiatric/Behavioral: Negative for dysphoric mood and sleep  disturbance. The patient is not nervous/anxious.          Past Medical History:   Diagnosis Date   • Arthritis     RIGHT HAND   • GERD (gastroesophageal reflux disease)    • Hearing deficit    • Hypertension    • Knee sprain    • Knee swelling    • Migraines    • Right hip pain     sched BRAD   • Sleep apnea     no machine, states needs to have new study   • Spinal headache     AFTER SPINAL TAP REQUIRED BLOOD PATCH     Past Surgical History:   Procedure Laterality Date   • APPENDECTOMY     • BACK SURGERY      X 2   • BACK SURGERY  12/07/2020   • CATARACT EXTRACTION      RIGHT EYE CATARACT REMOVED   • JOINT REPLACEMENT     • KNEE SURGERY Left 2011   • LAPAROSCOPIC CHOLECYSTECTOMY     • SHOULDER SURGERY Bilateral     X 2 Dr. Houston   • THYROIDECTOMY, PARTIAL     • TOTAL HIP ARTHROPLASTY Left 6/21/2016    Procedure: TOTAL HIP ARTHROPLASTY;  Surgeon: Sanchez Goel MD;  Location:  [facility];  Service:    • TOTAL HIP ARTHROPLASTY Right 8/16/2016    Procedure: TOTAL HIP ARTHROPLASTY depuy summit 7fr hemovac drain placement;  Surgeon: Sanchez Goel MD;  Location: [facility] OR;  Service:    • WRIST SURGERY Left      Family History   Problem Relation Age of Onset   • Lung disease Father    • COPD Father    • Cancer Sister    • Cancer Paternal Aunt    • Diabetes Maternal Grandmother    • Diabetes Maternal Grandfather    • Diabetes Paternal Grandmother    • Diabetes Paternal Grandfather          Objective:  There were no vitals filed for this visit.      08/23/21  1522   Weight: 134 kg (295 lb)     Body mass index is 40.01 kg/m².        Ortho Exam   AP and lateral oblique of the thumb does not show any evidence of a fracture.  Is tenderness of the thumb is along the ulnar collateral ligament of the metacarpophalangeal joint he has moderate pain with stressing and appears to be some instability with increased laxity to stressing.  There is no motor or sensory deficit.  There is no swelling no erythema and he has good capillary refill.   With regard to the knee continued marked tenderness over the MCL and valgus stressing of the knee at 30 degrees indicates increased laxity.  There is no significant posterior translation at 90 degrees.  There is marked tenderness over the MCL.  His calf nontender is good distal pulses.  The hip remains symptomatic awaiting the results of the MRI.    Assessment:        1. Pain of right thumb    2. Rupture of posterior cruciate ligament of right knee, initial encounter    3. Tear of medial collateral ligament of right knee, initial encounter    4. Traumatic compartment syndrome of left lower extremity, subsequent encounter    5. Right hip pain    6. Status post total replacement of both hips    7. Gamekeeper's thumb of right hand, initial encounter           Plan: Reviewed the x-rays of the thumb with the patient.  I believe he has a gamekeeper's thumb with a laxity of that ulnar collateral ligament I would refer to a hand surgeon.  With regard to the knee and been asked Dr. Tavarez to evaluate the MCL to see if he feels it needs to be surgically corrected at the been 2 months that he still has pain and feeling of instability.  He can return to see me after the MRI of the hip is been completed.  Answered all questions            Work Status:    BEN query complete.    Orders:  Orders Placed This Encounter   Procedures   • XR Finger 2+ View Right   • Ambulatory Referral to Orthopedic Surgery       Medications:  No orders of the defined types were placed in this encounter.      Followup:  Return in about 3 weeks (around 9/13/2021).          Dictated utilizing Dragon dictation

## 2021-08-26 ENCOUNTER — TELEPHONE (OUTPATIENT)
Dept: ORTHOPEDIC SURGERY | Facility: CLINIC | Age: 51
End: 2021-08-26

## 2021-08-26 NOTE — TELEPHONE ENCOUNTER
HUB STAFF ATTEMPTED NON-CLINICAL WARM TRANSFER - NO ANSWER     Caller: Luke Irizarry    Relationship: Self    Best call back number: [phone]    What orders are you requesting (i.e. lab or imaging): [facility] REFERRAL     Where will you receive your lab/imaging services: PATIENT WOULD LIKE [facility]ERR[facility] TO [facility] -[phone]     PATIENT'S Best call back number: [phone] (PLEASE CALL / LEAVE VMAIL WHEN REFERRAL SENT)     THANKS

## 2021-09-05 ENCOUNTER — HOSPITAL ENCOUNTER (OUTPATIENT)
Dept: MRI IMAGING | Facility: HOSPITAL | Age: 51
Discharge: HOME | End: 2021-09-05
Admitting: ORTHOPAEDIC SURGERY

## 2021-09-05 DIAGNOSIS — M25.551 RIGHT HIP PAIN: ICD-10-CM

## 2021-09-05 DIAGNOSIS — Z96.643 STATUS POST TOTAL REPLACEMENT OF BOTH HIPS: ICD-10-CM

## 2021-09-05 PROCEDURE — 73721 MRI JNT OF LWR EXTRE W/O DYE: CPT

## 2021-09-09 ENCOUNTER — OFFICE VISIT (OUTPATIENT)
Dept: ORTHOPEDIC SURGERY | Facility: CLINIC | Age: 51
End: 2021-09-09

## 2021-09-09 VITALS — HEIGHT: 72 IN | BODY MASS INDEX: 39.96 KG/M2 | WEIGHT: 295 LBS

## 2021-09-09 DIAGNOSIS — S83.411A TEAR OF MEDIAL COLLATERAL LIGAMENT OF RIGHT KNEE, INITIAL ENCOUNTER: ICD-10-CM

## 2021-09-09 DIAGNOSIS — M25.551 RIGHT HIP PAIN: ICD-10-CM

## 2021-09-09 DIAGNOSIS — S83.521A RUPTURE OF POSTERIOR CRUCIATE LIGAMENT OF RIGHT KNEE, INITIAL ENCOUNTER: ICD-10-CM

## 2021-09-09 DIAGNOSIS — M79.644 PAIN OF RIGHT THUMB: Primary | ICD-10-CM

## 2021-09-09 DIAGNOSIS — T79.A22D TRAUMATIC COMPARTMENT SYNDROME OF LEFT LOWER EXTREMITY, SUBSEQUENT ENCOUNTER: ICD-10-CM

## 2021-09-09 PROCEDURE — 99213 OFFICE O/P EST LOW 20 MIN: CPT | Performed by: ORTHOPAEDIC SURGERY

## 2021-09-09 NOTE — PROGRESS NOTES
Subjective: Right hip pain     Patient ID: Luke Irizarry II is a 51 y.o. male.    Chief Complaint:    History of Present Illness patient returns to review the results of the MRI of the right hand which I personally reviewed the images and report.  Shows no changes to the position of the implant no evidence of loosening malrotation fracture.  The only positive finding is some evidence of fluid collection over the greater trochanter indicating trochanteric bursitis but his pain is in the buttock area and sometimes in the groin area but he states it may be getting slightly better certainly not getting any worse.   He has been taking the Voltaren 75 mg a day.  The knee and the thumb remains symptomatic.     Social History     Occupational History   • Not on file   Tobacco Use   • Smoking status: Former Smoker     Packs/day: 1.00     Years: 7.00     Pack years: 7.00     Types: Cigarettes     Quit date: 2006     Years since quitting: 15.6   • Smokeless tobacco: Never Used   Vaping Use   • Vaping Use: Never used   Substance and Sexual Activity   • Alcohol use: No   • Drug use: Never   • Sexual activity: Yes     Partners: Male      Review of Systems   Constitutional: Negative for chills, diaphoresis, fever and unexpected weight change.   HENT: Negative for hearing loss, nosebleeds, sore throat and tinnitus.    Eyes: Negative for pain and visual disturbance.   Respiratory: Negative for cough, shortness of breath and wheezing.    Cardiovascular: Negative for chest pain and palpitations.   Gastrointestinal: Negative for abdominal pain, diarrhea, nausea and vomiting.   Endocrine: Negative for cold intolerance, heat intolerance and polydipsia.   Genitourinary: Negative for difficulty urinating, dysuria and hematuria.   Musculoskeletal: Positive for arthralgias and myalgias. Negative for joint swelling.   Skin: Negative for rash and wound.   Allergic/Immunologic: Negative for environmental allergies.   Neurological: Negative  for dizziness, syncope and numbness.   Hematological: Does not bruise/bleed easily.   Psychiatric/Behavioral: Negative for dysphoric mood and sleep disturbance. The patient is not nervous/anxious.          Past Medical History:   Diagnosis Date   • Arthritis     RIGHT HAND   • GERD (gastroesophageal reflux disease)    • Hearing deficit    • Hypertension    • Knee sprain    • Knee swelling    • Migraines    • Right hip pain     sched BRAD   • Sleep apnea     no machine, states needs to have new study   • Spinal headache     AFTER SPINAL TAP REQUIRED BLOOD PATCH     Past Surgical History:   Procedure Laterality Date   • APPENDECTOMY     • BACK SURGERY      X 2   • BACK SURGERY  12/07/2020   • CATARACT EXTRACTION      RIGHT EYE CATARACT REMOVED   • JOINT REPLACEMENT     • KNEE SURGERY Left 2011   • LAPAROSCOPIC CHOLECYSTECTOMY     • SHOULDER SURGERY Bilateral     X 2 Dr. Houston   • THYROIDECTOMY, PARTIAL     • TOTAL HIP ARTHROPLASTY Left 6/21/2016    Procedure: TOTAL HIP ARTHROPLASTY;  Surgeon: Sanchez Goel MD;  Location:  [facility] OR;  Service:    • TOTAL HIP ARTHROPLASTY Right 8/16/2016    Procedure: TOTAL HIP ARTHROPLASTY depuy summit 7fr hemovac drain placement;  Surgeon: Sanchez Goel MD;  Location:  [facility] OR;  Service:    • WRIST SURGERY Left      Family History   Problem Relation Age of Onset   • Lung disease Father    • COPD Father    • Cancer Sister    • Cancer Paternal Aunt    • Diabetes Maternal Grandmother    • Diabetes Maternal Grandfather    • Diabetes Paternal Grandmother    • Diabetes Paternal Grandfather          Objective:  There were no vitals filed for this visit.      09/09/21  0948   Weight: 134 kg (295 lb)     Body mass index is 40.01 kg/m².        Ortho Exam   He is alert and oriented.  With regard to he has again there is no pain with passive internal ex rotation of the hip with a negative Stinchfield test.  Some tenderness in the buttock area.  He has no tenderness over the greater trochanter  although the MRI indicated some fluid collection there.  He has a negative straight leg raise.  Hip flexors and extensors are 5 out of 5.  His calf is nontender is good distal pulses.  The knee remains symptomatic as though the thumb.    Assessment:  Study Result    Narrative & Impression   MRI RIGHT HIP WITHOUT CONTRAST     HISTORY: Anterior hip and groin pain.     TECHNIQUE: MRI right hip with metal artifact reduction protocol includes  coronal T1, STIR, as well as axial PD and right hip axial, coronal,  sagittal PD weighted sequences.     COMPARISON: None.     FINDINGS: Bilateral total hip arthroplasties are present. Despite  optimized protocol to minimize metal related artifact, there remains  some limitation in evaluation of the right hip. There is no evidence for  osteolysis or periprosthetic fracture. No periarticular collection or  fluid distention of the pseudocapsule is evident.     There is mild chronic bilateral gluteus medius and minimus muscular  atrophy. On the left, there is a greater trochanteric bursal fluid  collection measuring 6.5 cm in height by 2.1 cm transverse by 2.2 cm AP.  There is evidence of previous posterolateral instrumented fusion within  the lower lumbar spine and lumbosacral junction.     IMPRESSION:  1. Right total hip arthroplasty without evidence for complicating  feature.  2. Chronic appearing fluid collection adjacent to the left greater  trochanter consistent with chronic postoperative collection or  trochanteric bursitis.  3. Previous posterolateral instrumented fusion lower lumbar spine.     This report was finalized on 9/7/2021 12:15 PM by Dr. Gino Grubbs M.D.           1. Pain of right thumb    2. Rupture of posterior cruciate ligament of right knee, initial encounter    3. Tear of medial collateral ligament of right knee, initial encounter    4. Traumatic compartment syndrome of left lower extremity, subsequent encounter    5. Right hip pain           Plan: He does  state the hip may be getting slightly better some of the pain may be coming from the knee and the altered gait because of the knee pain.  He has an appointment next week to see Dr. Tavarez regarding the knee instability next week and appointment with Dr. Kleinert and Jennifer regarding his thumb next month.  Return to see me regarding the hip if still symptomatic after his been evaluated treated by Dr. Tavarez regarding the knee.  Answered all questions            Work Status:    BEN query complete.    Orders:  No orders of the defined types were placed in this encounter.      Medications:  No orders of the defined types were placed in this encounter.      Followup:  Return if symptoms worsen or fail to improve.          Dictated utilizing Dragon dictation

## 2021-09-17 ENCOUNTER — OFFICE VISIT (OUTPATIENT)
Dept: ORTHOPEDIC SURGERY | Facility: CLINIC | Age: 51
End: 2021-09-17

## 2021-09-17 VITALS
HEART RATE: 72 BPM | DIASTOLIC BLOOD PRESSURE: 87 MMHG | SYSTOLIC BLOOD PRESSURE: 146 MMHG | BODY MASS INDEX: 39.96 KG/M2 | WEIGHT: 295 LBS | HEIGHT: 72 IN

## 2021-09-17 DIAGNOSIS — S83.411A TEAR OF MEDIAL COLLATERAL LIGAMENT OF RIGHT KNEE, INITIAL ENCOUNTER: ICD-10-CM

## 2021-09-17 DIAGNOSIS — S83.521A RUPTURE OF POSTERIOR CRUCIATE LIGAMENT OF RIGHT KNEE, INITIAL ENCOUNTER: Primary | ICD-10-CM

## 2021-09-17 DIAGNOSIS — M94.261 CHONDROMALACIA OF RIGHT KNEE: ICD-10-CM

## 2021-09-17 PROCEDURE — 20610 DRAIN/INJ JOINT/BURSA W/O US: CPT | Performed by: ORTHOPAEDIC SURGERY

## 2021-09-17 PROCEDURE — 99214 OFFICE O/P EST MOD 30 MIN: CPT | Performed by: ORTHOPAEDIC SURGERY

## 2021-09-17 RX ADMIN — TRIAMCINOLONE ACETONIDE 80 MG: 40 INJECTION, SUSPENSION INTRA-ARTICULAR; INTRAMUSCULAR at 09:51

## 2021-09-17 RX ADMIN — LIDOCAINE HYDROCHLORIDE 8 ML: 10 INJECTION, SOLUTION EPIDURAL; INFILTRATION; INTRACAUDAL; PERINEURAL at 09:51
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
## 2021-09-22 RX ORDER — TRIAMCINOLONE ACETONIDE 40 MG/ML
80 INJECTION, SUSPENSION INTRA-ARTICULAR; INTRAMUSCULAR
Status: COMPLETED | OUTPATIENT
Start: 2021-09-17 | End: 2021-09-17

## 2021-09-22 RX ORDER — LIDOCAINE HYDROCHLORIDE 10 MG/ML
8 INJECTION, SOLUTION EPIDURAL; INFILTRATION; INTRACAUDAL; PERINEURAL
Status: COMPLETED | OUTPATIENT
Start: 2021-09-17 | End: 2021-09-17

## 2021-09-23 ENCOUNTER — HOSPITAL ENCOUNTER (OUTPATIENT)
Dept: PHYSICAL THERAPY | Facility: HOSPITAL | Age: 51
Setting detail: THERAPIES SERIES
Discharge: HOME | End: 2021-09-23

## 2021-09-23 DIAGNOSIS — S83.411A TEAR OF MEDIAL COLLATERAL LIGAMENT OF RIGHT KNEE, INITIAL ENCOUNTER: ICD-10-CM

## 2021-09-23 DIAGNOSIS — S83.521A RUPTURE OF POSTERIOR CRUCIATE LIGAMENT OF RIGHT KNEE, INITIAL ENCOUNTER: Primary | ICD-10-CM

## 2021-09-23 PROCEDURE — 97161 PT EVAL LOW COMPLEX 20 MIN: CPT
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
## 2021-10-28 ENCOUNTER — OFFICE VISIT (OUTPATIENT)
Dept: ORTHOPEDIC SURGERY | Facility: CLINIC | Age: 51
End: 2021-10-28

## 2021-10-28 VITALS — HEIGHT: 72 IN | BODY MASS INDEX: 40.63 KG/M2 | WEIGHT: 300 LBS

## 2021-10-28 DIAGNOSIS — S83.521A RUPTURE OF POSTERIOR CRUCIATE LIGAMENT OF RIGHT KNEE, INITIAL ENCOUNTER: ICD-10-CM

## 2021-10-28 DIAGNOSIS — M94.261 CHONDROMALACIA OF RIGHT KNEE: ICD-10-CM

## 2021-10-28 DIAGNOSIS — S83.411A TEAR OF MEDIAL COLLATERAL LIGAMENT OF RIGHT KNEE, INITIAL ENCOUNTER: Primary | ICD-10-CM

## 2021-10-28 PROCEDURE — 99212 OFFICE O/P EST SF 10 MIN: CPT | Performed by: ORTHOPAEDIC SURGERY

## 2021-10-28 RX ORDER — HYDROCODONE BITARTRATE AND ACETAMINOPHEN 5; 325 MG/1; MG/1
TABLET ORAL
COMMUNITY
Start: 2021-10-25 | End: 2022-09-23

## 2021-10-28 RX ORDER — PREGABALIN 50 MG/1
CAPSULE ORAL
COMMUNITY
Start: 2021-10-25 | End: 2022-05-04

## 2021-10-28 RX ORDER — LEVOCETIRIZINE DIHYDROCHLORIDE 5 MG/1
TABLET, FILM COATED ORAL
COMMUNITY
Start: 2021-09-18 | End: 2021-10-28

## 2021-11-05 ENCOUNTER — OFFICE VISIT (OUTPATIENT)
Dept: ORTHOPEDIC SURGERY | Facility: CLINIC | Age: 51
End: 2021-11-05

## 2021-11-05 VITALS — WEIGHT: 300 LBS | BODY MASS INDEX: 42.95 KG/M2 | HEIGHT: 70 IN

## 2021-11-05 DIAGNOSIS — M25.552 LEFT HIP PAIN: ICD-10-CM

## 2021-11-05 DIAGNOSIS — Z96.643 STATUS POST TOTAL REPLACEMENT OF BOTH HIPS: Primary | ICD-10-CM

## 2021-11-05 PROCEDURE — 73521 X-RAY EXAM HIPS BI 2 VIEWS: CPT | Performed by: ORTHOPAEDIC SURGERY

## 2021-11-05 PROCEDURE — 99214 OFFICE O/P EST MOD 30 MIN: CPT | Performed by: ORTHOPAEDIC SURGERY
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
## 2021-11-22 ENCOUNTER — HOSPITAL ENCOUNTER (OUTPATIENT)
Dept: MRI IMAGING | Facility: HOSPITAL | Age: 51
Discharge: HOME | End: 2021-11-22
Admitting: ORTHOPAEDIC SURGERY

## 2021-11-22 DIAGNOSIS — M25.552 LEFT HIP PAIN: ICD-10-CM

## 2021-11-22 DIAGNOSIS — Z96.643 STATUS POST TOTAL REPLACEMENT OF BOTH HIPS: ICD-10-CM

## 2021-11-22 PROCEDURE — 73721 MRI JNT OF LWR EXTRE W/O DYE: CPT

## 2021-12-07 ENCOUNTER — TELEPHONE (OUTPATIENT)
Dept: ORTHOPEDIC SURGERY | Facility: CLINIC | Age: 51
End: 2021-12-07

## 2021-12-07 NOTE — TELEPHONE ENCOUNTER
Caller: TRISHA SAPP     Relationship: SELF     Best call back number: [phone]    What was the call regarding: PATIENT IS CALLING TO RESCHEDULE 12/9 APPT- PLEASE CALL PATIENT BACK TO SCHEDULE     Do you require a callback: YES

## 2021-12-08 ENCOUNTER — OFFICE VISIT (OUTPATIENT)
Dept: ORTHOPEDIC SURGERY | Facility: CLINIC | Age: 51
End: 2021-12-08

## 2021-12-08 VITALS — WEIGHT: 300 LBS | BODY MASS INDEX: 42.95 KG/M2 | HEIGHT: 70 IN

## 2021-12-08 DIAGNOSIS — M76.892 HAMSTRING TENDINITIS OF LEFT THIGH: ICD-10-CM

## 2021-12-08 DIAGNOSIS — M70.62 TROCHANTERIC BURSITIS OF LEFT HIP: Primary | ICD-10-CM

## 2021-12-08 DIAGNOSIS — M67.952 TENDINOPATHY OF LEFT GLUTEAL REGION: ICD-10-CM

## 2021-12-08 PROCEDURE — 20610 DRAIN/INJ JOINT/BURSA W/O US: CPT | Performed by: ORTHOPAEDIC SURGERY

## 2021-12-08 PROCEDURE — 99213 OFFICE O/P EST LOW 20 MIN: CPT | Performed by: ORTHOPAEDIC SURGERY

## 2021-12-08 RX ORDER — METHYLPREDNISOLONE 4 MG/1
TABLET ORAL
Qty: 21 TABLET | Refills: 0 | Status: SHIPPED | OUTPATIENT
Start: 2021-12-08 | End: 2021-12-29

## 2021-12-08 RX ORDER — BETAMETHASONE SODIUM PHOSPHATE AND BETAMETHASONE ACETATE 3; 3 MG/ML; MG/ML
6 INJECTION, SUSPENSION INTRA-ARTICULAR; INTRALESIONAL; INTRAMUSCULAR; SOFT TISSUE
Status: COMPLETED | OUTPATIENT
Start: 2021-12-08 | End: 2021-12-08

## 2021-12-08 RX ORDER — LIDOCAINE HYDROCHLORIDE 10 MG/ML
4 INJECTION, SOLUTION EPIDURAL; INFILTRATION; INTRACAUDAL; PERINEURAL
Status: COMPLETED | OUTPATIENT
Start: 2021-12-08 | End: 2021-12-08

## 2021-12-08 RX ADMIN — BETAMETHASONE SODIUM PHOSPHATE AND BETAMETHASONE ACETATE 6 MG: 3; 3 INJECTION, SUSPENSION INTRA-ARTICULAR; INTRALESIONAL; INTRAMUSCULAR; SOFT TISSUE at 10:59

## 2021-12-08 RX ADMIN — LIDOCAINE HYDROCHLORIDE 4 ML: 10 INJECTION, SOLUTION EPIDURAL; INFILTRATION; INTRACAUDAL; PERINEURAL at 10:59

## 2021-12-08 NOTE — PROGRESS NOTES
Subjective: Left hip pain     Patient ID: Luke Irizarry II is a 51 y.o. male.    Chief Complaint:    History of Present Illness 51-year-old male returns with results of the MRI of the left hip was images and report are personally reviewed. Shows evidence of trochanteric bursitis and tendinopathy of the gluteal tendons and the hamstring. Did not show any tendon rupture or other abnormality involving the total hip implant. Persistent having pain laterally and posterior gluteal discomfort. Has been taking the meloxicam faithfully without significant relief.       Social History     Occupational History   • Not on file   Tobacco Use   • Smoking status: Former Smoker     Packs/day: 1.00     Years: 7.00     Pack years: 7.00     Types: Cigarettes     Quit date: 2006     Years since quitting: 15.9   • Smokeless tobacco: Never Used   Vaping Use   • Vaping Use: Never used   Substance and Sexual Activity   • Alcohol use: No   • Drug use: Never   • Sexual activity: Yes     Partners: Male      Review of Systems   Constitutional: Negative for chills, diaphoresis, fever and unexpected weight change.   HENT: Negative for hearing loss, nosebleeds, sore throat and tinnitus.    Eyes: Negative for pain and visual disturbance.   Respiratory: Negative for cough, shortness of breath and wheezing.    Cardiovascular: Negative for chest pain and palpitations.   Gastrointestinal: Negative for abdominal pain, diarrhea, nausea and vomiting.   Endocrine: Negative for cold intolerance, heat intolerance and polydipsia.   Genitourinary: Negative for difficulty urinating, dysuria and hematuria.   Musculoskeletal: Positive for arthralgias and myalgias. Negative for joint swelling.   Skin: Negative for rash and wound.   Allergic/Immunologic: Negative for environmental allergies.   Neurological: Negative for dizziness, syncope and numbness.   Hematological: Does not bruise/bleed easily.   Psychiatric/Behavioral: Negative for dysphoric mood and  sleep disturbance. The patient is not nervous/anxious.          Past Medical History:   Diagnosis Date   • Arthritis     RIGHT HAND   • GERD (gastroesophageal reflux disease)    • Hearing deficit    • Hypertension    • Knee sprain    • Knee swelling    • Migraines    • Right hip pain     sched BRAD   • Sleep apnea     no machine, states needs to have new study   • Spinal headache     AFTER SPINAL TAP REQUIRED BLOOD PATCH     Past Surgical History:   Procedure Laterality Date   • APPENDECTOMY     • BACK SURGERY      X 2   • BACK SURGERY  12/07/2020   • CATARACT EXTRACTION      RIGHT EYE CATARACT REMOVED   • JOINT REPLACEMENT     • KNEE SURGERY Left 2011   • LAPAROSCOPIC CHOLECYSTECTOMY     • SHOULDER SURGERY Bilateral     X 2 Dr. Houston   • THYROIDECTOMY, PARTIAL     • TOTAL HIP ARTHROPLASTY Left 6/21/2016    Procedure: TOTAL HIP ARTHROPLASTY;  Surgeon: Sanchez Goel MD;  Location:  [facility];  Service:    • TOTAL HIP ARTHROPLASTY Right 8/16/2016    Procedure: TOTAL HIP ARTHROPLASTY depuy summit 7fr hemovac drain placement;  Surgeon: Sanchez Goel MD;  Location: [facility];  Service:    • WRIST SURGERY Left      Family History   Problem Relation Age of Onset   • Lung disease Father    • COPD Father    • Cancer Sister    • Cancer Paternal Aunt    • Diabetes Maternal Grandmother    • Diabetes Maternal Grandfather    • Diabetes Paternal Grandmother    • Diabetes Paternal Grandfather          Objective:  There were no vitals filed for this visit.      12/08/21  1047   Weight: 136 kg (300 lb)     Body mass index is 43.05 kg/m².        Ortho Exam   Patient is alert and oriented x3. He does have tenderness to palpation over the left greater trochanter and pain with abduction against resistance. Again he has a negative Stinchfield test and no pain with passive internal rotation of the hip. Negative straight leg raise. Is no motor deficit his calf is nontender. He does have some sensory loss of the anterior aspect of his left  lower leg but again he had a compartmental syndrome treated at [facility] surgically. Is good capillary refill the skin is cool to touch. There is no ecchymosis or bruising noted over the greater trochanter.   MRI Hip LT WO    INDICATION:   51-year-old male with history of bilateral hip arthroplasty. Left hip pain radiating into the left lower extremity. Evaluate for flexor tendon rupture.    TECHNIQUE:   MRI of the left hip without IV contrast. Metal artifact reducing sequences were used.    COMPARISON:   None available.    FINDINGS:  There is evidence of previous bilateral hip arthroplasty, and artifact related to the joint prostheses limits evaluation. The marrow signal intensity in the imaged portion of the proximal femoral shafts and the bony pelvis appears within normal limits,  and there is no definite evidence of acute bony abnormality.    The iliopsoas muscles and tendons are somewhat poorly evaluated at the level of the hips secondary to artifact, however there is no evidence of disruption, and distally the tendons appear to attach normally to the lesser tuberosity bilaterally.    Tendinopathy in the distal left gluteal tendons, with no definite evidence of disruption. There is a fluid collection adjacent to the left greater, measuring approximately 5.4 cm in length by 1.8 cm in transverse diameter by 1.5 cm in anterior to  posterior diameter which could reflect postsurgical fluid collection, or could reflect an inflammatory process. There is a smaller fluid collection adjacent to the right greater trochanter.    The hamstrings tendons are intact.    Evaluation of the pelvic contents demonstrates no abnormal mass or fluid collection.   Report Conclusions  IMPRESSION:     1. Status post bilateral hip arthroplasty. Artifact limits evaluation however there is no definite acute bony abnormality.  2. The iliopsoas muscles and tendons appear intact bilaterally.  3. Fluid collection adjacent to the left greater  trochanter as described above, which could be postsurgical or may reflect an inflammatory process. There is a smaller fluid collection adjacent to the right greater trochanter.  4. Tendinopathy of the gluteal tendons and hamstrings tendons.    Signer Name: Inga Singh MD  Signed: 11/23/2021 2:27 PM  Workstation Name: [id number]   [facility]     Assessment:        1. Trochanteric bursitis of left hip    2. Tendinopathy of left gluteal region    3. Hamstring tendinitis of left thigh           Plan: Reviewed with the patient the results of the MRI. And is also reviewed with him his exam. He has trochanteric bursitis and gluteal and hamstring tendinopathy. After reviewing treatment options for the trochanteric bursitis were received a cortisone injection of lidocaine and Celestone in a ratio 4:1 which was given after sterile prep without complications. Postinjection instructions given to the patient particular by monitoring blood glucose level. For the gluteal and hamstring tendinitis will prescribe a Medrol dose pack. He is instructed to take the Medrol Dosepak and then resume the meloxicam. Return to see me in 3 weeks if still symptomatic we will send to physical therapy answered all questions.  Large Joint Arthrocentesis: L greater trochanteric bursa  Date/Time: 12/8/2021 10:59 AM  Consent given by: patient  Site marked: site marked  Timeout: Immediately prior to procedure a time out was called to verify the correct patient, procedure, equipment, support staff and site/side marked as required   Supporting Documentation  Indications: pain   Procedure Details  Location: hip - L greater trochanteric bursa  Preparation: Patient was prepped and draped in the usual sterile fashion  Needle size: 22 G  Approach: lateral  Medications administered: 4 mL lidocaine PF 1% 1 %; 6 mg betamethasone acetate-betamethasone sodium phosphate 6 (3-3) MG/ML  Patient tolerance: patient tolerated the  procedure well with no immediate complications                  Work Status:    [facility] query complete.    Orders:  Orders Placed This Encounter   Procedures   • Large Joint Arthrocentesis: L greater trochanteric bursa       Medications:  No orders of the defined types were placed in this encounter.      Followup:  Return in about 3 weeks (around 12/29/2021).          Dictated utilizing Dragon dictation

## 2021-12-29 ENCOUNTER — OFFICE VISIT (OUTPATIENT)
Dept: ORTHOPEDIC SURGERY | Facility: CLINIC | Age: 51
End: 2021-12-29

## 2021-12-29 VITALS — BODY MASS INDEX: 42.95 KG/M2 | HEIGHT: 70 IN | WEIGHT: 300 LBS

## 2021-12-29 DIAGNOSIS — M70.62 TROCHANTERIC BURSITIS OF LEFT HIP: Primary | ICD-10-CM

## 2021-12-29 DIAGNOSIS — M76.892 HAMSTRING TENDINITIS OF LEFT THIGH: ICD-10-CM

## 2021-12-29 DIAGNOSIS — Z96.643 STATUS POST TOTAL REPLACEMENT OF BOTH HIPS: ICD-10-CM

## 2021-12-29 DIAGNOSIS — M67.952 TENDINOPATHY OF LEFT GLUTEAL REGION: ICD-10-CM

## 2021-12-29 PROCEDURE — 99213 OFFICE O/P EST LOW 20 MIN: CPT | Performed by: ORTHOPAEDIC SURGERY

## 2021-12-29 NOTE — PROGRESS NOTES
Subjective: Trochanteric bursitis left hip     Patient ID: Luke Irizarry II is a 51 y.o. male.    Chief Complaint:    History of Present Illness patient seen for follow-up to the trochanteric bursitis of the left hip he was treated with a cortisone injection in the gluteal tendinitis and hamstring tendinitis treated with a steroid pack.  He is noted significant reduction of the pain and discomfort and is able to perform all activities of daily living without pain is able to sleep at night without the discomfort either.  Some minimal discomfort reports but does not limit his activity.       Social History     Occupational History   • Not on file   Tobacco Use   • Smoking status: Former Smoker     Packs/day: 1.00     Years: 7.00     Pack years: 7.00     Types: Cigarettes     Quit date:      Years since quittin.0   • Smokeless tobacco: Never Used   Vaping Use   • Vaping Use: Never used   Substance and Sexual Activity   • Alcohol use: No   • Drug use: Never   • Sexual activity: Yes     Partners: Male      Review of Systems   Constitutional: Negative for chills, diaphoresis, fever and unexpected weight change.   HENT: Negative for hearing loss, nosebleeds, sore throat and tinnitus.    Eyes: Negative for pain and visual disturbance.   Respiratory: Negative for cough, shortness of breath and wheezing.    Cardiovascular: Negative for chest pain and palpitations.   Gastrointestinal: Negative for abdominal pain, diarrhea, nausea and vomiting.   Endocrine: Negative for cold intolerance, heat intolerance and polydipsia.   Genitourinary: Negative for difficulty urinating, dysuria and hematuria.   Musculoskeletal: Positive for arthralgias and myalgias.   Skin: Negative for rash and wound.   Allergic/Immunologic: Negative for environmental allergies.   Neurological: Negative for dizziness, syncope and numbness.   Hematological: Does not bruise/bleed easily.   Psychiatric/Behavioral: Negative for dysphoric mood and  sleep disturbance. The patient is not nervous/anxious.          Past Medical History:   Diagnosis Date   • Arthritis     RIGHT HAND   • GERD (gastroesophageal reflux disease)    • Hearing deficit    • Hypertension    • Knee sprain    • Knee swelling    • Migraines    • Right hip pain     sched BRAD   • Sleep apnea     no machine, states needs to have new study   • Spinal headache     AFTER SPINAL TAP REQUIRED BLOOD PATCH     Past Surgical History:   Procedure Laterality Date   • APPENDECTOMY     • BACK SURGERY      X 2   • BACK SURGERY  12/07/2020   • CATARACT EXTRACTION      RIGHT EYE CATARACT REMOVED   • JOINT REPLACEMENT     • KNEE SURGERY Left 2011   • LAPAROSCOPIC CHOLECYSTECTOMY     • SHOULDER SURGERY Bilateral     X 2 Dr. Houston   • THYROIDECTOMY, PARTIAL     • TOTAL HIP ARTHROPLASTY Left 6/21/2016    Procedure: TOTAL HIP ARTHROPLASTY;  Surgeon: Sanchez Goel MD;  Location:  [facility] OR;  Service:    • TOTAL HIP ARTHROPLASTY Right 8/16/2016    Procedure: TOTAL HIP ARTHROPLASTY depuy summit 7fr hemovac drain placement;  Surgeon: Sanchez Goel MD;  Location: [facility] OR;  Service:    • WRIST SURGERY Left      Family History   Problem Relation Age of Onset   • Lung disease Father    • COPD Father    • Cancer Sister    • Cancer Paternal Aunt    • Diabetes Maternal Grandmother    • Diabetes Maternal Grandfather    • Diabetes Paternal Grandmother    • Diabetes Paternal Grandfather          Objective:  There were no vitals filed for this visit.      12/29/21  0935   Weight: 136 kg (300 lb)     Body mass index is 43.05 kg/m².        Ortho Exam   Is alert and oriented x3.  No tenderness over the greater trochanter is no pain with abduction against resistance with a negative Carey's test.  Denies any groin discomfort or thigh discomfort.  Denies any swelling.  He still has probably numbness in the lower leg but again he had a compartmental syndrome of the lower leg treated at [facility].  Is good distal pulses good capillary  refill the skin is cool to touch.  He does have some swelling noted to that left calf as compared to the right uninjured.    Assessment:        1. Trochanteric bursitis of left hip    2. Tendinopathy of left gluteal region    3. Hamstring tendinitis of left thigh    4. Status post total replacement of both hips           Plan: As far as his hip is concerned return to see me as needed.  For the lower leg swelling I did advise him to get in a knee-high MIKE hose.  He does have an appointment the neurologist concerning the paresthesia that he does have in that lower leg.  Regarding the hip return to see me as needed.  Did advise him to continue the Voltaren.  Answered all questions            Work Status:    [facility] query complete.    Orders:  No orders of the defined types were placed in this encounter.      Medications:  No orders of the defined types were placed in this encounter.      Followup:  Return if symptoms worsen or fail to improve.          Dictated utilizing Dragon dictation

## 2022-01-03 ENCOUNTER — OFFICE VISIT (OUTPATIENT)
Dept: ORTHOPEDIC SURGERY | Facility: CLINIC | Age: 52
End: 2022-01-03

## 2022-01-03 VITALS — HEIGHT: 70 IN | BODY MASS INDEX: 42.95 KG/M2 | WEIGHT: 300 LBS

## 2022-01-03 DIAGNOSIS — S83.521A RUPTURE OF POSTERIOR CRUCIATE LIGAMENT OF RIGHT KNEE, INITIAL ENCOUNTER: ICD-10-CM

## 2022-01-03 DIAGNOSIS — M94.261 CHONDROMALACIA OF RIGHT KNEE: ICD-10-CM

## 2022-01-03 DIAGNOSIS — S83.411A TEAR OF MEDIAL COLLATERAL LIGAMENT OF RIGHT KNEE, INITIAL ENCOUNTER: Primary | ICD-10-CM

## 2022-01-03 PROCEDURE — 99212 OFFICE O/P EST SF 10 MIN: CPT | Performed by: ORTHOPAEDIC SURGERY

## 2022-01-03 NOTE — PROGRESS NOTES
"Subjective:     Patient ID: Luke Irizarry II is a 51 y.o. male.    Chief Complaint:  Follow-up right knee pain, PCL tear, MCL strain, chondromalacia  Plan last visit-continue diclofenac, PCL brace  Last injection-right knee-2021  History of Present Illness  Luek Irizarry II returns to clinic today for evaluation of right knee. Patient overall is doing fairly well. He has had minimal pain with some mild swelling over the anterolateral aspect of the knee, which he rates as a 2 to 3 out of 10 in regards to the pain, primarily aching in nature with occasional sharp pain noted. He has had a couple of instability episodes. It has all been on uneven surfaces and he has not been wearing his brace during these times. He did wear his brace for approximately 3 weeks and did note improvement. He has had no locking episodes to his knee.     Patient confirms receiving the brace and wearing it  until he got a flare u. He reports that if he is walking in the yard and tripping over a stick when he tried to step over it and being unable to pick his leg up and it was so painful that he \"peed in his pants.\" The pain is very sharp, intense and does not last for long. He reports that this can occur when walking on uneven surfaces and that he walks well on flat surfaces. He currently denies experiencing any pain. He reports feeling \"a pull\" on the side of his knee side and that sometimes a little knot comes out.\"     Social History     Occupational History   • Not on file   Tobacco Use   • Smoking status: Former Smoker     Packs/day: 1.00     Years: 7.00     Pack years: 7.00     Types: Cigarettes     Quit date:      Years since quittin.0   • Smokeless tobacco: Never Used   Vaping Use   • Vaping Use: Never used   Substance and Sexual Activity   • Alcohol use: No   • Drug use: Never   • Sexual activity: Yes     Partners: Male      Past Medical History:   Diagnosis Date   • Arthritis     RIGHT HAND   • GERD " "(gastroesophageal reflux disease)    • Hearing deficit    • Hypertension    • Knee sprain    • Knee swelling    • Migraines    • Right hip pain     sched BRAD   • Sleep apnea     no machine, states needs to have new study   • Spinal headache     AFTER SPINAL TAP REQUIRED BLOOD PATCH     Past Surgical History:   Procedure Laterality Date   • APPENDECTOMY     • BACK SURGERY      X 2   • BACK SURGERY  12/07/2020   • CATARACT EXTRACTION      RIGHT EYE CATARACT REMOVED   • JOINT REPLACEMENT     • KNEE SURGERY Left 2011   • LAPAROSCOPIC CHOLECYSTECTOMY     • SHOULDER SURGERY Bilateral     X 2 Dr. Houston   • THYROIDECTOMY, PARTIAL     • TOTAL HIP ARTHROPLASTY Left 6/21/2016    Procedure: TOTAL HIP ARTHROPLASTY;  Surgeon: Sanchez Goel MD;  Location: [facility] OR;  Service:    • TOTAL HIP ARTHROPLASTY Right 8/16/2016    Procedure: TOTAL HIP ARTHROPLASTY depuy summit 7fr hemovac drain placement;  Surgeon: Sanchez Goel MD;  Location: [facility] OR;  Service:    • WRIST SURGERY Left        Family History   Problem Relation Age of Onset   • Lung disease Father    • COPD Father    • Cancer Sister    • Cancer Paternal Aunt    • Diabetes Maternal Grandmother    • Diabetes Maternal Grandfather    • Diabetes Paternal Grandmother    • Diabetes Paternal Grandfather          Review of Systems        Objective:  Vitals:    01/03/22 1548   Weight: 136 kg (300 lb)   Height: 177.8 cm (70\")         01/03/22  1548   Weight: 136 kg (300 lb)     Body mass index is 43.05 kg/m².  General: No acute distress.  Resp: normal respiratory effort  Skin: no rashes or wounds; normal turgor  Psych: mood and affect appropriate; recent and remote memory intact    Right Knee-    ROM 0 to130 degrees  4+/5 on flexion  4+/5 on extension  Maximal tenderness along the medial joint line    Effusion- minimal   Grade 1 Lachman  Anterior drawer- NEGATIVE  Posterior drawer- Grade 2/3 posterior drawer with moderate endpoint, which is improved on posterior drawer comparison to " prior exams.  Grade 2 opening on varus and valgus stress at 30 degrees and grade 1 opening on valgus stress at full extension.    Active patellar compression test- MILDLY POSITIVE    McMurrary's- NEGATIVE    Positive sensation light tough all distributions symmetric to contralateral side  Brisk cap refill all digits  1+ dorsalis pedis pulse  Positive sensation right foot, symmetric to the left.         Ortho Exam     Imaging:  None today  Assessment:        1. Tear of medial collateral ligament of right knee, initial encounter    2. Rupture of posterior cruciate ligament of right knee, initial encounter    3. Chondromalacia of right knee           Plan:          1. Recommended the patient continue with hip, core and quad strengthening at this point in time. From a pain standpoint, he is doing very well at this point in time, still having some instability. I told him that it is very important for him to utilize that PCL brace for stability especially if he is gonna be on uneven surfaces, but he should really utilize it as much as possible now.   2. I will follow up with him in 4 months with repeat x-rays of right knee at that time. All questions answered.      Luke Irizarry II was in agreement with plan and had all questions answered.     Orders:  No orders of the defined types were placed in this encounter.      Medications:  No orders of the defined types were placed in this encounter.      Followup:  Return in about 4 months (around 5/3/2022) for xrays needed at follow up.    Diagnoses and all orders for this visit:    1. Tear of medial collateral ligament of right knee, initial encounter (Primary)    2. Rupture of posterior cruciate ligament of right knee, initial encounter    3. Chondromalacia of right knee          Dictated utilizing Dragon dictation     Transcribed from ambient dictation for Binh Tavarez MD by Yareli Dubon.  01/03/22   18:27 EST    Patient verbalized consent to the visit  recording.

## 2022-01-16 ENCOUNTER — HOSPITAL ENCOUNTER (EMERGENCY)
Facility: HOSPITAL | Age: 52
Discharge: HOME | End: 2022-01-16
Attending: EMERGENCY MEDICINE | Admitting: EMERGENCY MEDICINE

## 2022-01-16 VITALS
SYSTOLIC BLOOD PRESSURE: 139 MMHG | DIASTOLIC BLOOD PRESSURE: 84 MMHG | HEIGHT: 72 IN | OXYGEN SATURATION: 93 % | TEMPERATURE: 98.1 F | RESPIRATION RATE: 20 BRPM | WEIGHT: 302.7 LBS | HEART RATE: 90 BPM | BODY MASS INDEX: 41 KG/M2

## 2022-01-16 DIAGNOSIS — U07.1 COVID-19: Primary | ICD-10-CM

## 2022-01-16 DIAGNOSIS — R05.9 COUGH: ICD-10-CM

## 2022-01-16 PROCEDURE — 99282 EMERGENCY DEPT VISIT SF MDM: CPT

## 2022-01-16 PROCEDURE — 99282 EMERGENCY DEPT VISIT SF MDM: CPT | Performed by: EMERGENCY MEDICINE

## 2022-01-16 RX ORDER — AZITHROMYCIN 250 MG/1
250 TABLET, FILM COATED ORAL
COMMUNITY
Start: 2022-01-11 | End: 2022-02-03

## 2022-01-16 RX ORDER — BENZONATATE 100 MG/1
100 CAPSULE ORAL 2 TIMES DAILY PRN
Qty: 15 CAPSULE | Refills: 0 | Status: SHIPPED | OUTPATIENT
Start: 2022-01-16 | End: 2022-01-23

## 2022-01-16 RX ORDER — GUAIFENESIN AND CODEINE PHOSPHATE 100; 10 MG/5ML; MG/5ML
5-10 SOLUTION ORAL
COMMUNITY
Start: 2022-01-11 | End: 2022-02-03

## 2022-01-17 NOTE — DISCHARGE INSTRUCTIONS
Please return to the emergency room for any worsening pain, fevers, cough, congestion, weakness, difficulties breathing or any other concerns.  
s/p chemo

## 2022-01-17 NOTE — ED PROVIDER NOTES
"Subjective   History of Present Illness  History of Present Illness    Chief complaint: Cough and congestion    Location: Respiratory    Quality/Severity: Moderate coughing    Timing/Duration: Persistent for about a week    Modifying Factors: None    Narrative: This patient presents for evaluation of persistent cough and congestion despite taking cough medications with codeine that were prescribed to him recently.  He was diagnosed with COVID 19 and has had typical COVID symptoms for about a week now.  He continues having frequent and forceful coughing spells.  Apparently tonight his wife was so concerned about his coughing that she said he might \"have a heart attack with his coughing.\"  So she insisted he should come here for evaluation.  Patient denies having any chest pain.  He denies having any presyncopal or near syncopal episodes.  He denies any lightheadedness or dizziness or diaphoresis.  He is not having any vomiting or diarrhea.  When he is not coughing he says his breathing feels okay.    Associated Symptoms: As above    Review of Systems  Review of Systems   Constitutional: Negative for activity change, diaphoresis and fever.   HENT: Positive for congestion, rhinorrhea and sinus pressure. Negative for trouble swallowing and voice change.    Eyes: Negative for pain and visual disturbance.   Respiratory: Positive for cough. Negative for shortness of breath, wheezing and stridor.    Cardiovascular: Negative for chest pain.   Gastrointestinal: Negative for abdominal pain, diarrhea and vomiting.   Genitourinary: Negative for dysuria.   Musculoskeletal: Positive for myalgias.   Skin: Negative for color change and rash.   Neurological: Negative for syncope and headaches.   All other systems reviewed and are negative.  Past Medical History:   Diagnosis Date   • Arthritis     RIGHT HAND   • GERD (gastroesophageal reflux disease)    • Hearing deficit    • Hypertension    • Knee sprain    • Knee swelling    • " Migraines    • Right hip pain     sched BRAD   • Sleep apnea     no machine, states needs to have new study   • Spinal headache     AFTER SPINAL TAP REQUIRED BLOOD PATCH       Allergies   Allergen Reactions   • Amoxicillin-Pot Clavulanate GI Intolerance   • Lisinopril Cough     [id number]: cough   • Adhesive Tape Rash     Other reaction(s): Blisters   • Tape Rash       Past Surgical History:   Procedure Laterality Date   • APPENDECTOMY     • BACK SURGERY      X 2   • BACK SURGERY  2020   • CATARACT EXTRACTION      RIGHT EYE CATARACT REMOVED   • JOINT REPLACEMENT     • KNEE SURGERY Left    • LAPAROSCOPIC CHOLECYSTECTOMY     • SHOULDER SURGERY Bilateral     X 2 Dr. Houston   • THYROIDECTOMY, PARTIAL     • TOTAL HIP ARTHROPLASTY Left 2016    Procedure: TOTAL HIP ARTHROPLASTY;  Surgeon: Sanchez Goel MD;  Location:  [facility] OR;  Service:    • TOTAL HIP ARTHROPLASTY Right 2016    Procedure: TOTAL HIP ARTHROPLASTY depuy summit 7fr hemovac drain placement;  Surgeon: Sanchez Goel MD;  Location:  [facility] OR;  Service:    • WRIST SURGERY Left        Family History   Problem Relation Age of Onset   • Lung disease Father    • COPD Father    • Cancer Sister    • Cancer Paternal Aunt    • Diabetes Maternal Grandmother    • Diabetes Maternal Grandfather    • Diabetes Paternal Grandmother    • Diabetes Paternal Grandfather        Social History     Socioeconomic History   • Marital status:    Tobacco Use   • Smoking status: Former Smoker     Packs/day: 1.00     Years: 7.00     Pack years: 7.00     Types: Cigarettes     Quit date:      Years since quittin.0   • Smokeless tobacco: Never Used   Vaping Use   • Vaping Use: Never used   Substance and Sexual Activity   • Alcohol use: No   • Drug use: Never   • Sexual activity: Yes     Partners: Male     ED Triage Vitals [22]   Temp Heart Rate Resp BP SpO2   98.1 °F (36.7 °C) 89 16 139/84 93 %      Temp src Heart Rate Source Patient  Position BP Location FiO2 (%)   Oral Monitor Lying Right arm --     Objective   Physical Exam  Physical Exam  Vitals and nursing note reviewed.   Constitutional:       General: He is not in acute distress.     Appearance: He is well-developed. He is not toxic-appearing.      Comments: Pleasant gentleman.  Resting comfortably in the bed.  No sign of distress at all.   HENT:      Head: Normocephalic and atraumatic.      Mouth/Throat:      Mouth: Mucous membranes are moist.   Eyes:      General:         Right eye: No discharge.         Left eye: No discharge.      Pupils: Pupils are equal, round, and reactive to light.   Cardiovascular:      Rate and Rhythm: Normal rate and regular rhythm.      Pulses: Normal pulses.      Heart sounds: No murmur heard.  Pulmonary:      Effort: Pulmonary effort is normal. No respiratory distress.      Breath sounds: Normal breath sounds. No stridor. No wheezing, rhonchi or rales.      Comments: Lungs are clear bilaterally here.  Completely normal work of breathing demonstrated.  No coughing noted during my examination.  Musculoskeletal:         General: No deformity. Normal range of motion.      Cervical back: Normal range of motion and neck supple.   Skin:     General: Skin is warm and dry.      Coloration: Skin is not jaundiced.      Findings: No erythema or rash.   Neurological:      General: No focal deficit present.      Mental Status: He is alert and oriented to person, place, and time. Mental status is at baseline.   Psychiatric:         Behavior: Behavior normal.         Thought Content: Thought content normal.         Judgment: Judgment normal.     Procedures           ED Course  ED Course as of 01/16/22 2251   Sun Jan 16, 2022 2005 Patient presented because he is having persistent cough and congestion with COVID-19 diagnosis.  His physical exam was not worrisome here.  Vital signs were quite reassuring.  I explained that I was not particularly worried about any acute cardiac  "or pulmonary emergency condition right now.  There was no need for further work-up or observation, either.  I did prescribe some Tessalon Perles for him and explained that those may help with his coughing symptoms but that this viral illness will have to run its course in time.  I reviewed with him the usual \"return to ER\" instructions for any worsening signs or symptoms.  That he was discharged home in good condition. [KINGSLEY]      ED Course User Index  [KINGSLEY] Steven Mock MD                                                 [facility]    Final diagnoses:   COVID-19   Cough       ED Disposition  ED Disposition     ED Disposition Condition Comment    Discharge Stable           Danie St MD  [address] [facility] [id number] [phone]    Schedule an appointment as soon as possible for a visit in 1 week  Follow-up with PCP for repeat evaluation         Medication List      New Prescriptions    benzonatate 100 MG capsule  Commonly known as: TESSALON  Take 1 capsule by mouth 2 (Two) Times a Day As Needed for Cough for up to 7 days.           Where to Get Your Medications      These medications were sent to [facility]/pharmacy #[id number] - [place], [place] - [facility] [phone] [place] [phone] [address] [address]    Phone: [phone]   · benzonatate 100 MG capsule          Steven Mock MD  01/16/22 5775    "

## 2022-01-17 NOTE — ED NOTES
"Patient ambulated to room with steady gait and no SOA noted. Patient tested COVID+ on 1/10/21 at [facility]. Was prescribed azithromycin and guaifensein-codeine for his symptoms but states he is not \"getting any better\". Also states he is alternating between tylenol and ibuprofen at home. Patient is currently afebrile and non hypoxic. Patient states he had one episode of coughing today where he \"coughed so hard I was dizzy after\".      Lisa Edwards RN  01/16/22 2010    "

## 2022-02-03 ENCOUNTER — OFFICE VISIT (OUTPATIENT)
Dept: FAMILY MEDICINE CLINIC | Facility: CLINIC | Age: 52
End: 2022-02-03

## 2022-02-03 VITALS
WEIGHT: 298 LBS | HEIGHT: 72 IN | OXYGEN SATURATION: 95 % | BODY MASS INDEX: 40.36 KG/M2 | DIASTOLIC BLOOD PRESSURE: 86 MMHG | TEMPERATURE: 96.8 F | HEART RATE: 99 BPM | SYSTOLIC BLOOD PRESSURE: 142 MMHG

## 2022-02-03 DIAGNOSIS — M79.605 PAIN AND SWELLING OF LEFT LOWER EXTREMITY: ICD-10-CM

## 2022-02-03 DIAGNOSIS — E11.65 TYPE 2 DIABETES MELLITUS WITH HYPERGLYCEMIA, WITHOUT LONG-TERM CURRENT USE OF INSULIN: Primary | ICD-10-CM

## 2022-02-03 DIAGNOSIS — U09.9 POST-COVID CHRONIC DYSPNEA: ICD-10-CM

## 2022-02-03 DIAGNOSIS — R06.09 POST-COVID CHRONIC DYSPNEA: ICD-10-CM

## 2022-02-03 DIAGNOSIS — M79.89 PAIN AND SWELLING OF LEFT LOWER EXTREMITY: ICD-10-CM

## 2022-02-03 LAB
EXPIRATION DATE: ABNORMAL
HBA1C MFR BLD: 8.6 %
Lab: ABNORMAL

## 2022-02-03 PROCEDURE — 3052F HG A1C>EQUAL 8.0%<EQUAL 9.0%: CPT | Performed by: NURSE PRACTITIONER

## 2022-02-03 PROCEDURE — 99214 OFFICE O/P EST MOD 30 MIN: CPT | Performed by: NURSE PRACTITIONER

## 2022-02-03 PROCEDURE — 83036 HEMOGLOBIN GLYCOSYLATED A1C: CPT | Performed by: NURSE PRACTITIONER

## 2022-02-03 RX ORDER — LISINOPRIL 20 MG/1
20 TABLET ORAL DAILY
COMMUNITY
Start: 2022-01-24 | End: 2022-05-04

## 2022-02-03 RX ORDER — GUAIFENESIN AND CODEINE PHOSPHATE 100; 10 MG/5ML; MG/5ML
5-10 SOLUTION ORAL 4 TIMES DAILY PRN
Qty: 240 ML | Refills: 0 | Status: SHIPPED | OUTPATIENT
Start: 2022-02-03 | End: 2022-03-21

## 2022-02-03 RX ORDER — IPRATROPIUM BROMIDE AND ALBUTEROL SULFATE 2.5; .5 MG/3ML; MG/3ML
3 SOLUTION RESPIRATORY (INHALATION) EVERY 4 HOURS PRN
Qty: 360 ML | Refills: 1 | Status: SHIPPED | OUTPATIENT
Start: 2022-02-03 | End: 2022-03-31

## 2022-02-03 RX ORDER — METHYLPREDNISOLONE 4 MG/1
TABLET ORAL
Qty: 21 TABLET | Refills: 0 | Status: SHIPPED | OUTPATIENT
Start: 2022-02-03 | End: 2022-02-09

## 2022-02-04 DIAGNOSIS — U09.9 POST-COVID CHRONIC DYSPNEA: ICD-10-CM

## 2022-02-04 DIAGNOSIS — M79.89 PAIN AND SWELLING OF LEFT LOWER EXTREMITY: ICD-10-CM

## 2022-02-04 DIAGNOSIS — R06.09 POST-COVID CHRONIC DYSPNEA: ICD-10-CM

## 2022-02-04 DIAGNOSIS — M79.605 PAIN AND SWELLING OF LEFT LOWER EXTREMITY: ICD-10-CM

## 2022-02-04 RX ORDER — LEVOFLOXACIN 500 MG/1
500 TABLET, FILM COATED ORAL DAILY
Qty: 10 TABLET | Refills: 0 | Status: SHIPPED | OUTPATIENT
Start: 2022-02-04 | End: 2022-03-21

## 2022-02-04 NOTE — PROGRESS NOTES
Please call the patient regarding his abnormal result.  Sugar still shows pneumonia.  I am sending an antibiotic.  Please have him follow-up with me in 2 weeks for resolution.  Also let him know that ultrasound was negative for any DVT.  I am enclosing results as they did not send that to me.    INDICATION:   52 year-old with left calf pain for a day     TECHNIQUE:     Real-time ultrasound was performed of the left lower extremity utilizing spectral and   color Doppler with compression and augmentation techniques.     COMPARISON:   None available.     FINDINGS:   No evidence of a left lower extremity deep vein thrombosis. The common femoral vein   through the popliteal vein are widely patent. There is normal compressibility with   spontaneous and phasic waveforms. No calf vein thrombus.     IMPRESSION:   No deep venous thrombus in the left lower extremity.     Dictated by:Que Childs   Dictated DT/TM:  2/3/2022 1:01 PM   Signed by:  Que Childs   Signed DT/TM:  2/3/2022 1:02 PM   : KAIA/LORI:   ##### Final #####

## 2022-02-09 ENCOUNTER — HOSPITAL ENCOUNTER (OUTPATIENT)
Dept: CARDIOLOGY | Facility: HOSPITAL | Age: 52
Discharge: HOME | End: 2022-02-09

## 2022-02-09 ENCOUNTER — OFFICE VISIT (OUTPATIENT)
Dept: FAMILY MEDICINE CLINIC | Facility: CLINIC | Age: 52
End: 2022-02-09

## 2022-02-09 VITALS
SYSTOLIC BLOOD PRESSURE: 128 MMHG | TEMPERATURE: 95.7 F | DIASTOLIC BLOOD PRESSURE: 84 MMHG | HEIGHT: 72 IN | WEIGHT: 294 LBS | BODY MASS INDEX: 39.82 KG/M2 | HEART RATE: 84 BPM | OXYGEN SATURATION: 95 %

## 2022-02-09 VITALS
SYSTOLIC BLOOD PRESSURE: 142 MMHG | WEIGHT: 293 LBS | BODY MASS INDEX: 39.68 KG/M2 | DIASTOLIC BLOOD PRESSURE: 80 MMHG | HEIGHT: 72 IN

## 2022-02-09 VITALS
HEART RATE: 109 BPM | WEIGHT: 293 LBS | RESPIRATION RATE: 24 BRPM | OXYGEN SATURATION: 99 % | SYSTOLIC BLOOD PRESSURE: 142 MMHG | HEIGHT: 72 IN | BODY MASS INDEX: 39.68 KG/M2 | DIASTOLIC BLOOD PRESSURE: 80 MMHG

## 2022-02-09 DIAGNOSIS — J12.82 PNEUMONIA DUE TO COVID-19 VIRUS: ICD-10-CM

## 2022-02-09 DIAGNOSIS — R06.02 SHORTNESS OF BREATH: ICD-10-CM

## 2022-02-09 DIAGNOSIS — R07.2 PRECORDIAL PAIN: ICD-10-CM

## 2022-02-09 DIAGNOSIS — R06.09 POST-COVID CHRONIC DYSPNEA: Primary | ICD-10-CM

## 2022-02-09 DIAGNOSIS — U07.1 PNEUMONIA DUE TO COVID-19 VIRUS: ICD-10-CM

## 2022-02-09 DIAGNOSIS — U09.9 POST-COVID CHRONIC DYSPNEA: Primary | ICD-10-CM

## 2022-02-09 LAB
ALBUMIN SERPL-MCNC: 3.9 G/DL (ref 3.5–5.2)
ALBUMIN/GLOB SERPL: 1.2 G/DL
ALP SERPL-CCNC: 107 U/L (ref 39–117)
ALT SERPL W P-5'-P-CCNC: 60 U/L (ref 1–41)
ANION GAP SERPL CALCULATED.3IONS-SCNC: 14.4 MMOL/L (ref 5–15)
AST SERPL-CCNC: 28 U/L (ref 1–40)
BASOPHILS # BLD AUTO: 0.14 10*3/MM3 (ref 0–0.2)
BASOPHILS NFR BLD AUTO: 1.2 % (ref 0–1.5)
BILIRUB SERPL-MCNC: 0.4 MG/DL (ref 0–1.2)
BUN SERPL-MCNC: 25 MG/DL (ref 6–20)
BUN/CREAT SERPL: 27.2 (ref 7–25)
CALCIUM SPEC-SCNC: 9.3 MG/DL (ref 8.6–10.5)
CHLORIDE SERPL-SCNC: 97 MMOL/L (ref 98–107)
CO2 SERPL-SCNC: 24.6 MMOL/L (ref 22–29)
CREAT SERPL-MCNC: 0.92 MG/DL (ref 0.76–1.27)
D DIMER PPP FEU-MCNC: 0.3 MCGFEU/ML (ref 0–0.49)
DEPRECATED RDW RBC AUTO: 41.3 FL (ref 37–54)
EOSINOPHIL # BLD AUTO: 0.54 10*3/MM3 (ref 0–0.4)
EOSINOPHIL NFR BLD AUTO: 4.5 % (ref 0.3–6.2)
ERYTHROCYTE [DISTWIDTH] IN BLOOD BY AUTOMATED COUNT: 13.4 % (ref 12.3–15.4)
GFR SERPL CREATININE-BSD FRML MDRD: 87 ML/MIN/1.73
GLOBULIN UR ELPH-MCNC: 3.3 GM/DL
GLUCOSE SERPL-MCNC: 202 MG/DL (ref 65–99)
HCT VFR BLD AUTO: 45.9 % (ref 37.5–51)
HGB BLD-MCNC: 15.3 G/DL (ref 13–17.7)
IMM GRANULOCYTES # BLD AUTO: 0.12 10*3/MM3 (ref 0–0.05)
IMM GRANULOCYTES NFR BLD AUTO: 1 % (ref 0–0.5)
LYMPHOCYTES # BLD AUTO: 3.4 10*3/MM3 (ref 0.7–3.1)
LYMPHOCYTES NFR BLD AUTO: 28.5 % (ref 19.6–45.3)
MCH RBC QN AUTO: 28.4 PG (ref 26.6–33)
MCHC RBC AUTO-ENTMCNC: 33.3 G/DL (ref 31.5–35.7)
MCV RBC AUTO: 85.3 FL (ref 79–97)
MONOCYTES # BLD AUTO: 0.76 10*3/MM3 (ref 0.1–0.9)
MONOCYTES NFR BLD AUTO: 6.4 % (ref 5–12)
NEUTROPHILS NFR BLD AUTO: 58.4 % (ref 42.7–76)
NEUTROPHILS NFR BLD AUTO: 6.96 10*3/MM3 (ref 1.7–7)
NRBC BLD AUTO-RTO: 0 /100 WBC (ref 0–0.2)
NT-PROBNP SERPL-MCNC: 23.7 PG/ML (ref 0–900)
PLATELET # BLD AUTO: 235 10*3/MM3 (ref 140–450)
PMV BLD AUTO: 11.3 FL (ref 6–12)
POTASSIUM SERPL-SCNC: 4.1 MMOL/L (ref 3.5–5.2)
PROT SERPL-MCNC: 7.2 G/DL (ref 6–8.5)
RBC # BLD AUTO: 5.38 10*6/MM3 (ref 4.14–5.8)
SODIUM SERPL-SCNC: 136 MMOL/L (ref 136–145)
T4 FREE SERPL-MCNC: 1.19 NG/DL (ref 0.93–1.7)
TROPONIN T SERPL-MCNC: <0.01 NG/ML (ref 0–0.03)
TSH SERPL DL<=0.05 MIU/L-ACNC: 3.77 UIU/ML (ref 0.27–4.2)
WBC NRBC COR # BLD: 11.92 10*3/MM3 (ref 3.4–10.8)

## 2022-02-09 PROCEDURE — 80053 COMPREHEN METABOLIC PANEL: CPT

## 2022-02-09 PROCEDURE — 93306 TTE W/DOPPLER COMPLETE: CPT

## 2022-02-09 PROCEDURE — 93000 ELECTROCARDIOGRAM COMPLETE: CPT | Performed by: NURSE PRACTITIONER

## 2022-02-09 PROCEDURE — 36415 COLL VENOUS BLD VENIPUNCTURE: CPT

## 2022-02-09 PROCEDURE — 99214 OFFICE O/P EST MOD 30 MIN: CPT | Performed by: NURSE PRACTITIONER

## 2022-02-09 PROCEDURE — 84443 ASSAY THYROID STIM HORMONE: CPT

## 2022-02-09 PROCEDURE — 93005 ELECTROCARDIOGRAM TRACING: CPT | Performed by: INTERNAL MEDICINE

## 2022-02-09 PROCEDURE — 84439 ASSAY OF FREE THYROXINE: CPT

## 2022-02-09 PROCEDURE — 83880 ASSAY OF NATRIURETIC PEPTIDE: CPT | Performed by: INTERNAL MEDICINE

## 2022-02-09 PROCEDURE — 94760 N-INVAS EAR/PLS OXIMETRY 1: CPT

## 2022-02-09 PROCEDURE — 85025 COMPLETE CBC W/AUTO DIFF WBC: CPT

## 2022-02-09 PROCEDURE — 25010000002 PERFLUTREN (DEFINITY) 8.476 MG IN SODIUM CHLORIDE (PF) 0.9 % 10 ML INJECTION: Performed by: INTERNAL MEDICINE

## 2022-02-09 PROCEDURE — 84484 ASSAY OF TROPONIN QUANT: CPT | Performed by: INTERNAL MEDICINE

## 2022-02-09 PROCEDURE — 99204 OFFICE O/P NEW MOD 45 MIN: CPT | Performed by: INTERNAL MEDICINE

## 2022-02-09 PROCEDURE — 85379 FIBRIN DEGRADATION QUANT: CPT | Performed by: INTERNAL MEDICINE

## 2022-02-09 PROCEDURE — 93010 ELECTROCARDIOGRAM REPORT: CPT | Performed by: INTERNAL MEDICINE

## 2022-02-09 PROCEDURE — 93306 TTE W/DOPPLER COMPLETE: CPT | Performed by: INTERNAL MEDICINE

## 2022-02-09 RX ORDER — SODIUM CHLORIDE 0.9 % (FLUSH) 0.9 %
10 SYRINGE (ML) INJECTION AS NEEDED
Status: DISCONTINUED | OUTPATIENT
Start: 2022-02-09 | End: 2023-03-27

## 2022-02-09 RX ORDER — NITROGLYCERIN 0.4 MG/1
0.4 TABLET SUBLINGUAL
Status: DISCONTINUED | OUTPATIENT
Start: 2022-02-09 | End: 2022-12-14

## 2022-02-09 RX ADMIN — PERFLUTREN 1.5 ML: 6.52 INJECTION, SUSPENSION INTRAVENOUS at 16:20

## 2022-02-09 NOTE — PROGRESS NOTES
Date of Office Visit: 2022  Encounter Provider: Cristine Barrios MD  Place of Service: [facility] CARDIOLOGY  Patient Name: Luke Irizarry II  :1970      Patient ID:  Luke Irizarry II is a 52 y.o. male is here for          History of Present Illness    He has a history of sleep apnea, diabetes and hypertension.  He does not use machine for his sleep apnea.    He developed COVID 2022.  He was not vaccinated.  He became aggressively more short winded and did end up getting hospitalized at [facility].  There, he was hypoxic and after recovery was discharged home on chronic oxygen.  Despite that, he has had consistent brain fog and short windedness.  He has had some anterior chest pain as well.  He is felt his heart racing and skipping but is had no dizziness or syncope.  He said no fevers, chills but has had a cough.  He does not smoke.  He has no vomiting or diarrhea.     proBNP normal, troponin normal, glucose 202, otherwise normal CMP, white count 11.9, otherwise normal CBC and D-dimer are normal.    Past Medical History:   Diagnosis Date   • Arthritis     RIGHT HAND   • Diabetes mellitus (HCC)    • GERD (gastroesophageal reflux disease)    • Hearing deficit    • Hypertension    • Knee sprain    • Knee swelling    • Migraines    • Right hip pain     sched BRAD   • Sleep apnea     no machine, states needs to have new study   • Spinal headache     AFTER SPINAL TAP REQUIRED BLOOD PATCH         Past Surgical History:   Procedure Laterality Date   • APPENDECTOMY     • BACK SURGERY      X 2   • BACK SURGERY  2020   • CATARACT EXTRACTION      RIGHT EYE CATARACT REMOVED   • JOINT REPLACEMENT     • KNEE SURGERY Left    • LAPAROSCOPIC CHOLECYSTECTOMY     • SHOULDER SURGERY Bilateral     X 2 Dr. Houston   • THYROIDECTOMY, PARTIAL     • TOTAL HIP ARTHROPLASTY Left 2016    Procedure: TOTAL HIP ARTHROPLASTY;  Surgeon: Sanchez Goel MD;   Location: [facility] OR;  Service:    • TOTAL HIP ARTHROPLASTY Right 8/16/2016    Procedure: TOTAL HIP ARTHROPLASTY depuy summit 7fr hemovac drain placement;  Surgeon: Sanchez Goel MD;  Location: [facility] OR;  Service:    • WRIST SURGERY Left        Current Outpatient Medications on File Prior to Encounter   Medication Sig Dispense Refill   • Accu-Chek FastClix Lancets misc CHECK GLUCOSE TWICE DAILY     • albuterol sulfate  (90 Base) MCG/ACT inhaler Inhale 2 puffs Every 4 (Four) Hours As Needed for Wheezing. 1 inhaler 0   • cyanocobalamin (VITAMIN B-12) 1000 MCG tablet Take 1,000 mcg by mouth Daily.     • diclofenac (VOLTAREN) 75 MG EC tablet Take 75 mg by mouth 2 (Two) Times a Day As Needed. for pain     • guaiFENesin-codeine (GUAIFENESIN AC) 100-10 MG/5ML liquid Take 5-10 mL by mouth 4 (Four) Times a Day As Needed for Cough. 240 mL 0   • hydrochlorothiazide (HYDRODIURIL) 50 MG tablet Take 50 mg by mouth Daily.  1   • HYDROcodone-acetaminophen (NORCO) 5-325 MG per tablet      • ipratropium-albuterol (DUO-NEB) 0.5-2.5 mg/3 ml nebulizer Take 3 mL by nebulization Every 4 (Four) Hours As Needed for Wheezing or Shortness of Air. 360 mL 1   • levoFLOXacin (Levaquin) 500 MG tablet Take 1 tablet by mouth Daily. 10 tablet 0   • linagliptin (Tradjenta) 5 MG tablet tablet Take 1 tablet by mouth Daily. 30 tablet 2   • lisinopril (PRINIVIL,ZESTRIL) 20 MG tablet Take 20 mg by mouth Daily.     • metFORMIN (GLUCOPHAGE) 500 MG tablet 500 mg 2 (Two) Times a Day With Meals.  1   • mupirocin (BACTROBAN) 2 % ointment      • pantoprazole (PROTONIX) 40 MG EC tablet Take 40 mg by mouth Daily.     • pregabalin (LYRICA) 50 MG capsule      • [DISCONTINUED] methylPREDNISolone (MEDROL) 4 MG dose pack Take as directed on package instructions. 21 tablet 0     No current facility-administered medications on file prior to encounter.       Social History     Socioeconomic History   • Marital status:    Tobacco Use   • Smoking status:  "Former Smoker     Packs/day: 1.00     Years: 7.00     Pack years: 7.00     Types: Cigarettes     Quit date:      Years since quittin.1   • Smokeless tobacco: Never Used   Vaping Use   • Vaping Use: Never used   Substance and Sexual Activity   • Alcohol use: No   • Drug use: Never   • Sexual activity: Yes     Partners: Female           ROS    Procedures    ECG 12 Lead    Date/Time: 2022 1:05 PM  Performed by: Cristine Barrios MD  Authorized by: Cristine Barrios MD   Comparison: compared with previous ECG   Rhythm: sinus rhythm    Clinical impression: abnormal EKG  Comments: S1,Q3,T3                Objective:      Vitals:    22 1323   BP: 142/80   BP Location: Right arm   Patient Position: Sitting   Pulse: 109   Resp: 24   SpO2: 99%   Weight: 133 kg (293 lb)   Height: 182.9 cm (72\")     Body mass index is 39.74 kg/m².    Vitals reviewed.   Constitutional:       General: Not in acute distress.     Appearance: Well-developed. Not diaphoretic.   Eyes:      General: No scleral icterus.     Conjunctiva/sclera: Conjunctivae normal.   HENT:      Head: Normocephalic and atraumatic.   Neck:      Thyroid: No thyromegaly.      Vascular: No carotid bruit or JVD.      Lymphadenopathy: No cervical adenopathy.   Pulmonary:      Effort: Pulmonary effort is normal. No respiratory distress.      Breath sounds: Normal breath sounds. No wheezing. No rhonchi. No rales.   Chest:      Chest wall: Not tender to palpatation.   Cardiovascular:      Normal rate. Regular rhythm.      Murmurs: There is no murmur.      No gallop.   Pulses:     Intact distal pulses.   Abdominal:      General: Bowel sounds are normal. There is no distension or abdominal bruit.      Palpations: Abdomen is soft. There is no abdominal mass.      Tenderness: There is no abdominal tenderness.   Musculoskeletal:         General: No deformity.      Extremities: No clubbing present.     Cervical back: Neck supple. Skin:     General: Skin is warm " and dry. There is no cyanosis.      Coloration: Skin is not pale.      Findings: No rash.   Neurological:      Mental Status: Alert and oriented to person, place, and time.      Cranial Nerves: No cranial nerve deficit.   Psychiatric:         Judgment: Judgment normal.         Lab Review:       Assessment:      Diagnosis Plan   1. Precordial pain  Cardiac Monitoring    Vital Signs - Once    Vital Signs - As Needed    Pulse Oximetry    Oxygen Therapy- Nasal Cannula; Titrate for SPO2: 92%, equal to or greater than    Insert Peripheral IV    sodium chloride 0.9 % flush 10 mL    nitroglycerin (NITROSTAT) SL tablet 0.4 mg    NPO Diet    Bathroom Privileges With Assistance    CBC & Differential    Comprehensive Metabolic Panel    Troponin T    D-Dimer    proBNP    ECG 12 Lead    Cardiac Monitoring    Vital Signs - Once    Insert Peripheral IV    NPO Diet    Bathroom Privileges With Assistance    Troponin T    Troponin T    D-Dimer    D-Dimer    proBNP    proBNP    TSH+Free T4   2. Shortness of breath  Cardiac Monitoring    Vital Signs - Once    Vital Signs - As Needed    Pulse Oximetry    Oxygen Therapy- Nasal Cannula; Titrate for SPO2: 92%, equal to or greater than    Insert Peripheral IV    sodium chloride 0.9 % flush 10 mL    nitroglycerin (NITROSTAT) SL tablet 0.4 mg    NPO Diet    Bathroom Privileges With Assistance    CBC & Differential    Comprehensive Metabolic Panel    Troponin T    D-Dimer    proBNP    ECG 12 Lead    Cardiac Monitoring    Vital Signs - Once    Insert Peripheral IV    NPO Diet    Bathroom Privileges With Assistance    Troponin T    Troponin T    D-Dimer    D-Dimer    proBNP    proBNP    TSH+Free T4     1. Persistent dyspnea on exertion with a mildly abnormal EKG.  My concern would be for pulmonary embolism versus pneumonia.  Await laboratory values.  2. Tachycardia, intermittent.  Depending on the results of his testing, will need a monitor placed either today or as an outpatient.  Would be  looking for atrial fibrillation.  3. Anterior chest pain, check echocardiogram if no wall motion normalities, set up outpatient stress evaluation.  4. Status post Covid 1/7/2022, was unvaccinated.  5. Chronic hypoxia, on oxygen at home.  6. LISANDRA at home, not using CPAP  7. Diabetes mellitus     Plan:       Plan is as above.

## 2022-02-09 NOTE — ADDENDUM NOTE
Encounter addended by: Cristine Barrios MD on: 2/9/2022 2:07 PM   Actions taken: Clinical Note Signed

## 2022-02-09 NOTE — PROGRESS NOTES
"Chief Complaint  Shortness of Breath (since he had covid) and Hypertension (heart feels funny since covid)    Subjective          Luke Irizarry II presents to [facility]  History of Present Illness     Patient is here today for follow up on shortness of breath and heart \"feeling funny\" since covid.  States his chest will \"get a bubble in it\" and then goes away.  This typically last 2-3 minutes.     If he has oxygen on he can't really feel this.   Steroid has helped pain in chest tremendously     Shortness of breath-oxygen sat is normal, but shortness of breath is no better.  We treated again as new patient last visit for pneumonia on 2/3/2022    Had chest x-ray that showed ongoing pneumonia and recommended follow up    Getting into cardiopulmonary rehab.      Feels like spirometer is going well.     Nebulizer treatment-twice daily    Objective   Vital Signs:   /84   Pulse 84   Temp 95.7 °F (35.4 °C)   Ht 182.9 cm (72\")   Wt 133 kg (294 lb)   SpO2 95%   BMI 39.87 kg/m²     Physical Exam  Constitutional:       Appearance: Normal appearance.   Cardiovascular:      Rate and Rhythm: Normal rate and regular rhythm.   Pulmonary:      Effort: Pulmonary effort is normal.      Breath sounds: Normal breath sounds.   Neurological:      Mental Status: He is alert and oriented to person, place, and time.   Psychiatric:         Mood and Affect: Mood normal.         Behavior: Behavior normal.         Thought Content: Thought content normal.         Judgment: Judgment normal.                ECG 12 Lead    Date/Time: 2/9/2022 11:32 AM  Performed by: Esdras Ash APRN  Authorized by: Esdras Ash APRN   Comparison: compared with previous ECG from 1/18/2022  Comparison to previous ECG: Last EKG DIGNA franco  Rhythm: sinus rhythm  Conduction comments: Possible inferior myocardial infarction    Clinical impression: abnormal EKG          crResult Review :                 Assessment " and Plan    Diagnoses and all orders for this visit:    1. Post-COVID chronic dyspnea (Primary)  -     CT Chest Without Contrast; Future  -     ECG 12 Lead    2. Pneumonia due to COVID-19 virus  -     CT Chest Without Contrast; Future  -     ECG 12 Lead      I am ordering a CT scan of the chest for further evaluation.  Upon evaluation of EKG it showed a possible inferior myocardial infarction.  I called the chest pain clinic and they are getting him in today for check.    Will call with results of CT any changes needed plan of care.  Patient was instructed to go for further evaluation to the chest pain clinic right now.  He verbalizes understanding      Follow Up   No follow-ups on file.  Patient was given instructions and counseling regarding his condition or for health maintenance advice. Please see specific information pulled into the AVS if appropriate.

## 2022-02-09 NOTE — ADDENDUM NOTE
Encounter addended by: Cristine Barrios MD on: 2/9/2022 2:33 PM   Actions taken: Clinical Note Signed

## 2022-02-10 ENCOUNTER — TELEPHONE (OUTPATIENT)
Dept: CARDIOLOGY | Facility: CLINIC | Age: 52
End: 2022-02-10

## 2022-02-10 DIAGNOSIS — R06.02 SHORTNESS OF BREATH: ICD-10-CM

## 2022-02-10 DIAGNOSIS — R00.2 PALPITATIONS: ICD-10-CM

## 2022-02-10 DIAGNOSIS — R00.0 TACHYCARDIA: ICD-10-CM

## 2022-02-10 DIAGNOSIS — R07.2 PRECORDIAL PAIN: Primary | ICD-10-CM

## 2022-02-10 LAB
AORTIC ARCH: 2.1 CM
ASCENDING AORTA: 3.7 CM
BH CV ECHO MEAS - ACS: 2.3 CM
BH CV ECHO MEAS - AO MAX PG (FULL): 0.62 MMHG
BH CV ECHO MEAS - AO MAX PG: 4.1 MMHG
BH CV ECHO MEAS - AO MEAN PG (FULL): 0.77 MMHG
BH CV ECHO MEAS - AO MEAN PG: 2.6 MMHG
BH CV ECHO MEAS - AO ROOT AREA (BSA CORRECTED): 1.2
BH CV ECHO MEAS - AO ROOT AREA: 7.4 CM^2
BH CV ECHO MEAS - AO ROOT DIAM: 3.1 CM
BH CV ECHO MEAS - AO V2 MAX: 101.8 CM/SEC
BH CV ECHO MEAS - AO V2 MEAN: 77.7 CM/SEC
BH CV ECHO MEAS - AO V2 VTI: 17.2 CM
BH CV ECHO MEAS - ASC AORTA: 3.7 CM
BH CV ECHO MEAS - AVA(I,A): 2.9 CM^2
BH CV ECHO MEAS - AVA(I,D): 2.9 CM^2
BH CV ECHO MEAS - AVA(V,A): 3.3 CM^2
BH CV ECHO MEAS - AVA(V,D): 3.3 CM^2
BH CV ECHO MEAS - BSA(HAYCOCK): 2.7 M^2
BH CV ECHO MEAS - BSA: 2.5 M^2
BH CV ECHO MEAS - BZI_BMI: 39.7 KILOGRAMS/M^2
BH CV ECHO MEAS - BZI_METRIC_HEIGHT: 182.9 CM
BH CV ECHO MEAS - BZI_METRIC_WEIGHT: 132.9 KG
BH CV ECHO MEAS - EDV(CUBED): 121.2 ML
BH CV ECHO MEAS - EDV(MOD-SP2): 83 ML
BH CV ECHO MEAS - EDV(MOD-SP4): 90 ML
BH CV ECHO MEAS - EDV(TEICH): 115.4 ML
BH CV ECHO MEAS - EF(CUBED): 79.3 %
BH CV ECHO MEAS - EF(MOD-BP): 62.8 %
BH CV ECHO MEAS - EF(MOD-SP2): 61.4 %
BH CV ECHO MEAS - EF(MOD-SP4): 64.4 %
BH CV ECHO MEAS - EF(TEICH): 71.5 %
BH CV ECHO MEAS - ESV(CUBED): 25.1 ML
BH CV ECHO MEAS - ESV(MOD-SP2): 32 ML
BH CV ECHO MEAS - ESV(MOD-SP4): 32 ML
BH CV ECHO MEAS - ESV(TEICH): 32.9 ML
BH CV ECHO MEAS - FS: 40.9 %
BH CV ECHO MEAS - IVS/LVPW: 0.93
BH CV ECHO MEAS - IVSD: 0.92 CM
BH CV ECHO MEAS - LAT PEAK E' VEL: 10.9 CM/SEC
BH CV ECHO MEAS - LV DIASTOLIC VOL/BSA (35-75): 35.9 ML/M^2
BH CV ECHO MEAS - LV MASS(C)D: 169.1 GRAMS
BH CV ECHO MEAS - LV MASS(C)DI: 67.5 GRAMS/M^2
BH CV ECHO MEAS - LV MAX PG: 3.5 MMHG
BH CV ECHO MEAS - LV MEAN PG: 1.9 MMHG
BH CV ECHO MEAS - LV SYSTOLIC VOL/BSA (12-30): 12.8 ML/M^2
BH CV ECHO MEAS - LV V1 MAX: 93.8 CM/SEC
BH CV ECHO MEAS - LV V1 MEAN: 62 CM/SEC
BH CV ECHO MEAS - LV V1 VTI: 13.9 CM
BH CV ECHO MEAS - LVIDD: 4.9 CM
BH CV ECHO MEAS - LVIDS: 2.9 CM
BH CV ECHO MEAS - LVLD AP2: 8.7 CM
BH CV ECHO MEAS - LVLD AP4: 8.9 CM
BH CV ECHO MEAS - LVLS AP2: 7 CM
BH CV ECHO MEAS - LVLS AP4: 6.9 CM
BH CV ECHO MEAS - LVOT AREA (M): 3.5 CM^2
BH CV ECHO MEAS - LVOT AREA: 3.6 CM^2
BH CV ECHO MEAS - LVOT DIAM: 2.1 CM
BH CV ECHO MEAS - LVPWD: 1 CM
BH CV ECHO MEAS - MED PEAK E' VEL: 10.2 CM/SEC
BH CV ECHO MEAS - MV A DUR: 0.12 SEC
BH CV ECHO MEAS - MV A MAX VEL: 73.3 CM/SEC
BH CV ECHO MEAS - MV DEC SLOPE: 255.5 CM/SEC^2
BH CV ECHO MEAS - MV DEC TIME: 0.2 SEC
BH CV ECHO MEAS - MV E MAX VEL: 44.1 CM/SEC
BH CV ECHO MEAS - MV E/A: 0.6
BH CV ECHO MEAS - MV MAX PG: 2.2 MMHG
BH CV ECHO MEAS - MV MEAN PG: 1.2 MMHG
BH CV ECHO MEAS - MV P1/2T MAX VEL: 59.7 CM/SEC
BH CV ECHO MEAS - MV P1/2T: 68.5 MSEC
BH CV ECHO MEAS - MV V2 MAX: 74.7 CM/SEC
BH CV ECHO MEAS - MV V2 MEAN: 53.2 CM/SEC
BH CV ECHO MEAS - MV V2 VTI: 13.8 CM
BH CV ECHO MEAS - MVA P1/2T LCG: 3.7 CM^2
BH CV ECHO MEAS - MVA(P1/2T): 3.2 CM^2
BH CV ECHO MEAS - MVA(VTI): 3.6 CM^2
BH CV ECHO MEAS - PA ACC TIME: 0.08 SEC
BH CV ECHO MEAS - PA MAX PG (FULL): 2.2 MMHG
BH CV ECHO MEAS - PA MAX PG: 4.6 MMHG
BH CV ECHO MEAS - PA PR(ACCEL): 42.2 MMHG
BH CV ECHO MEAS - PA V2 MAX: 107.2 CM/SEC
BH CV ECHO MEAS - PI END-D VEL: 119.4 CM/SEC
BH CV ECHO MEAS - PULM A REVS DUR: 0.15 SEC
BH CV ECHO MEAS - PULM A REVS VEL: 35 CM/SEC
BH CV ECHO MEAS - PULM DIAS VEL: 34.1 CM/SEC
BH CV ECHO MEAS - PULM S/D: 1.3
BH CV ECHO MEAS - PULM SYS VEL: 43.8 CM/SEC
BH CV ECHO MEAS - PVA(V,A): 2.8 CM^2
BH CV ECHO MEAS - PVA(V,D): 2.8 CM^2
BH CV ECHO MEAS - QP/QS: 0.81
BH CV ECHO MEAS - RAP SYSTOLE: 8 MMHG
BH CV ECHO MEAS - RV MAX PG: 2.4 MMHG
BH CV ECHO MEAS - RV MEAN PG: 1.1 MMHG
BH CV ECHO MEAS - RV V1 MAX: 78 CM/SEC
BH CV ECHO MEAS - RV V1 MEAN: 47.4 CM/SEC
BH CV ECHO MEAS - RV V1 VTI: 10.5 CM
BH CV ECHO MEAS - RVOT AREA: 3.8 CM^2
BH CV ECHO MEAS - RVOT DIAM: 2.2 CM
BH CV ECHO MEAS - SI(AO): 50.7 ML/M^2
BH CV ECHO MEAS - SI(CUBED): 38.4 ML/M^2
BH CV ECHO MEAS - SI(LVOT): 19.8 ML/M^2
BH CV ECHO MEAS - SI(MOD-SP2): 20.4 ML/M^2
BH CV ECHO MEAS - SI(MOD-SP4): 23.1 ML/M^2
BH CV ECHO MEAS - SI(TEICH): 32.9 ML/M^2
BH CV ECHO MEAS - SV(AO): 127.1 ML
BH CV ECHO MEAS - SV(CUBED): 96.1 ML
BH CV ECHO MEAS - SV(LVOT): 49.6 ML
BH CV ECHO MEAS - SV(MOD-SP2): 51 ML
BH CV ECHO MEAS - SV(MOD-SP4): 58 ML
BH CV ECHO MEAS - SV(RVOT): 40.3 ML
BH CV ECHO MEAS - SV(TEICH): 82.5 ML
BH CV ECHO MEAS - TAPSE (>1.6): 1.8 CM
BH CV ECHO MEASUREMENTS AVERAGE E/E' RATIO: 4.18
BH CV XLRA - RV BASE: 3.1 CM
BH CV XLRA - RV LENGTH: 8.4 CM
BH CV XLRA - RV MID: 3.3 CM
BH CV XLRA - TDI S': 8.5 CM/SEC
LEFT ATRIUM VOLUME INDEX: 14 ML/M2
MAXIMAL PREDICTED HEART RATE: 169 BPM
SINUS: 3.2 CM
STJ: 3.4 CM
STRESS TARGET HR: 144 BPM

## 2022-02-10 NOTE — TELEPHONE ENCOUNTER
Reviewed results with Luke Irizarry II and pt verbalized understanding    Thank you,  Veda Davies RN  Triage Nurse

## 2022-02-15 ENCOUNTER — TELEPHONE (OUTPATIENT)
Dept: FAMILY MEDICINE CLINIC | Facility: CLINIC | Age: 52
End: 2022-02-15

## 2022-02-15 NOTE — TELEPHONE ENCOUNTER
Caller: Luke Irizarry II    Relationship: Self    Best call back number: [phone] (M)    What is the medical concern/diagnosis: LUNGS    What specialty or service is being requested:  CT SCANN    What is the provider, practice or medical service name:      What is the office location:      What is the office phone number:      Any additional details:  PATIENT CALLED TO CHECK THE STATUS OF THE REFERRAL BEING MADE FOR HIM TO HAVE A CT SCAN DONE.    PLEASE CONTACT PATIENT TO ADVISE.        THANKS

## 2022-02-16 ENCOUNTER — TELEPHONE (OUTPATIENT)
Dept: CARDIOLOGY | Facility: CLINIC | Age: 52
End: 2022-02-16

## 2022-02-16 ENCOUNTER — NURSE TRIAGE (OUTPATIENT)
Dept: CALL CENTER | Facility: HOSPITAL | Age: 52
End: 2022-02-16

## 2022-02-16 LAB
MAXIMAL PREDICTED HEART RATE: 169 BPM
STRESS TARGET HR: 144 BPM

## 2022-02-16 NOTE — TELEPHONE ENCOUNTER
Has rare PvCs and does feel these on his monitor - I with him about using calcium channel blocker.  I would consider diltiazem 120 mg nightly.  He is going to discuss this further with his PCP Esdras Ash.  I will send a message along to her.    He does not need to follow-up with me but would have him follow-up in your office for the PVCs.  They are rare.  He also thinks that they are getting better.  Would certainly consider using diltiazem nightly for this.

## 2022-02-16 NOTE — TELEPHONE ENCOUNTER
"  HUB , call, Pt.  wanting to reschedule appt. has to get tooth pulled, Friday same day as Follow up appt. With Esdras Jacinto  CYNTHIA,  little sob still, has been that way since COVID pneumonia, he states is not going to ER, nothing new wrong.    CT Scan in March.  so rescheduled till after CT Scan. Pt. states no disress just wants to reschedule. Soft Transfer to office.   Reason for Disposition  • Requesting regular office appointment    Additional Information  • Negative: [1] Caller is not with the adult (patient) AND [2] reporting urgent symptoms  • Negative: Lab result questions  • Negative: Medication questions  • Negative: Caller can't be reached by phone  • Negative: Caller has already spoken to PCP or another triager  • Negative: RN needs further essential information from caller in order to complete triage  • Negative: [1] Caller requesting NON-URGENT health information AND [2] PCP's office is the best resource  • Negative: Health Information question, no triage required and triager able to answer question    Answer Assessment - Initial Assessment Questions  1. REASON FOR CALL or QUESTION: \"What is your reason for calling today?\" or \"How can I best help you?\" or \"What question do you have that I can help answer?\"      Wants to reschedule appt.    Protocols used: INFORMATION ONLY CALL - NO TRIAGE-ADULT-      "

## 2022-02-23 ENCOUNTER — HOSPITAL ENCOUNTER (OUTPATIENT)
Dept: CARDIOLOGY | Facility: HOSPITAL | Age: 52
Discharge: HOME | End: 2022-02-23
Admitting: INTERNAL MEDICINE

## 2022-02-23 ENCOUNTER — TELEPHONE (OUTPATIENT)
Dept: CARDIOLOGY | Facility: CLINIC | Age: 52
End: 2022-02-23

## 2022-02-23 VITALS — BODY MASS INDEX: 39.55 KG/M2 | HEIGHT: 72 IN | WEIGHT: 292 LBS

## 2022-02-23 DIAGNOSIS — R06.02 SHORTNESS OF BREATH: ICD-10-CM

## 2022-02-23 DIAGNOSIS — R07.2 PRECORDIAL PAIN: ICD-10-CM

## 2022-02-23 DIAGNOSIS — R00.0 TACHYCARDIA: ICD-10-CM

## 2022-02-23 DIAGNOSIS — R00.2 PALPITATIONS: ICD-10-CM

## 2022-02-23 LAB
BH CV NUCLEAR PRIOR STUDY: 3
BH CV REST NUCLEAR ISOTOPE DOSE: 11.2 MCI
BH CV STRESS BP STAGE 1: NORMAL
BH CV STRESS COMMENTS STAGE 1: NORMAL
BH CV STRESS DOSE REGADENOSON STAGE 1: 0.4
BH CV STRESS DURATION MIN STAGE 1: 0
BH CV STRESS DURATION SEC STAGE 1: 10
BH CV STRESS HR STAGE 1: 103
BH CV STRESS NUCLEAR ISOTOPE DOSE: 35.7 MCI
BH CV STRESS PROTOCOL 1: NORMAL
BH CV STRESS RECOVERY BP: NORMAL MMHG
BH CV STRESS RECOVERY HR: 85 BPM
BH CV STRESS STAGE 1: 1
LV EF NUC BP: 54 %
MAXIMAL PREDICTED HEART RATE: 169 BPM
PERCENT MAX PREDICTED HR: 60.95 %
STRESS BASELINE BP: NORMAL MMHG
STRESS BASELINE HR: 71 BPM
STRESS PERCENT HR: 72 %
STRESS POST EXERCISE DUR SEC: 10 SEC
STRESS POST PEAK BP: NORMAL MMHG
STRESS POST PEAK HR: 103 BPM
STRESS TARGET HR: 144 BPM

## 2022-02-23 PROCEDURE — 25010000002 REGADENOSON 0.4 MG/5ML SOLUTION: Performed by: INTERNAL MEDICINE

## 2022-02-23 PROCEDURE — 93016 CV STRESS TEST SUPVJ ONLY: CPT | Performed by: INTERNAL MEDICINE

## 2022-02-23 PROCEDURE — 93018 CV STRESS TEST I&R ONLY: CPT | Performed by: INTERNAL MEDICINE

## 2022-02-23 PROCEDURE — 78452 HT MUSCLE IMAGE SPECT MULT: CPT

## 2022-02-23 PROCEDURE — A9502 TC99M TETROFOSMIN: HCPCS | Performed by: INTERNAL MEDICINE

## 2022-02-23 PROCEDURE — 78452 HT MUSCLE IMAGE SPECT MULT: CPT | Performed by: INTERNAL MEDICINE

## 2022-02-23 PROCEDURE — 93017 CV STRESS TEST TRACING ONLY: CPT

## 2022-02-23 PROCEDURE — 0 TECHNETIUM TETROFOSMIN KIT: Performed by: INTERNAL MEDICINE

## 2022-02-23 RX ADMIN — TETROFOSMIN 1 DOSE: 1.38 INJECTION, POWDER, LYOPHILIZED, FOR SOLUTION INTRAVENOUS at 08:25

## 2022-02-23 RX ADMIN — REGADENOSON 0.4 MG: 0.08 INJECTION, SOLUTION INTRAVENOUS at 09:18

## 2022-02-23 RX ADMIN — TETROFOSMIN 1 DOSE: 1.38 INJECTION, POWDER, LYOPHILIZED, FOR SOLUTION INTRAVENOUS at 09:18

## 2022-02-23 NOTE — TELEPHONE ENCOUNTER
Reviewed results and recommendations with Luke Irizarry II.  Patient verbalized understanding.    Thank you,  Veda Davies RN  [place] Nurse [facility] [place]

## 2022-03-04 ENCOUNTER — OFFICE VISIT (OUTPATIENT)
Dept: FAMILY MEDICINE CLINIC | Facility: CLINIC | Age: 52
End: 2022-03-04

## 2022-03-04 VITALS
OXYGEN SATURATION: 95 % | HEART RATE: 81 BPM | SYSTOLIC BLOOD PRESSURE: 132 MMHG | WEIGHT: 294.6 LBS | TEMPERATURE: 96.6 F | HEIGHT: 72 IN | BODY MASS INDEX: 39.9 KG/M2 | DIASTOLIC BLOOD PRESSURE: 86 MMHG

## 2022-03-04 DIAGNOSIS — U07.1 PNEUMONIA DUE TO COVID-19 VIRUS: Primary | ICD-10-CM

## 2022-03-04 DIAGNOSIS — J12.82 PNEUMONIA DUE TO COVID-19 VIRUS: Primary | ICD-10-CM

## 2022-03-04 PROCEDURE — 99213 OFFICE O/P EST LOW 20 MIN: CPT | Performed by: NURSE PRACTITIONER

## 2022-03-04 RX ORDER — KETOROLAC TROMETHAMINE 10 MG/1
TABLET, FILM COATED ORAL
COMMUNITY
Start: 2022-02-28 | End: 2022-03-21

## 2022-03-04 RX ORDER — ATORVASTATIN CALCIUM 20 MG/1
20 TABLET, FILM COATED ORAL DAILY
Qty: 30 TABLET | Refills: 2 | Status: SHIPPED | OUTPATIENT
Start: 2022-03-04 | End: 2022-05-04 | Stop reason: SDUPTHER

## 2022-03-04 NOTE — PROGRESS NOTES
"Chief Complaint  Pneumonia (follow up)    Heraclio Irizarry II presents to [facility]  History of Present Illness      Patient is here today for follow up on pneumonia.  He states he is feeling much better.  Shortness of breath comes and goes with activity. Reports cough has almost resolved.    Wheezing has went away.      Didn't go for CT scan.  Forgot all about that.      Objective        Vital Signs:   /86   Pulse 81   Temp 96.6 °F (35.9 °C)   Ht 182.9 cm (72\")   Wt 134 kg (294 lb 9.6 oz)   SpO2 95%   BMI 39.95 kg/m²     Physical Exam  Constitutional:       Appearance: Normal appearance.   Cardiovascular:      Rate and Rhythm: Normal rate and regular rhythm.      Pulses: Normal pulses.   Pulmonary:      Effort: Pulmonary effort is normal.      Breath sounds: Normal breath sounds.   Neurological:      Mental Status: He is alert and oriented to person, place, and time.   Psychiatric:         Mood and Affect: Mood normal.         Behavior: Behavior normal.         Thought Content: Thought content normal.         Judgment: Judgment normal.        Result Review :                 Assessment and Plan    Diagnoses and all orders for this visit:    1. Pneumonia due to COVID-19 virus (Primary)    Other orders  -     atorvastatin (LIPITOR) 20 MG tablet; Take 1 tablet by mouth Daily.  Dispense: 30 tablet; Refill: 2      I would still like patient to obtain CT scan of chest.  Patient to call and reschedule.  I am restarting Lipitor as he has history of diabetes.  Follow-up in 2 months for complete physical and fasting labs.  Follow-up sooner if any issues with breathing.      Follow Up   Return in about 2 months (around 5/4/2022) for Annual physical.  Patient was given instructions and counseling regarding his condition or for health maintenance advice. Please see specific information pulled into the AVS if appropriate.       "

## 2022-03-09 ENCOUNTER — TELEPHONE (OUTPATIENT)
Dept: FAMILY MEDICINE CLINIC | Facility: CLINIC | Age: 52
End: 2022-03-09

## 2022-03-09 DIAGNOSIS — U07.1 PNEUMONIA DUE TO COVID-19 VIRUS: Primary | ICD-10-CM

## 2022-03-09 DIAGNOSIS — R06.09 POST-COVID CHRONIC DYSPNEA: ICD-10-CM

## 2022-03-09 DIAGNOSIS — J12.82 PNEUMONIA DUE TO COVID-19 VIRUS: Primary | ICD-10-CM

## 2022-03-09 DIAGNOSIS — U09.9 POST-COVID CHRONIC DYSPNEA: ICD-10-CM

## 2022-03-09 NOTE — TELEPHONE ENCOUNTER
Pt advised that he need to reschedule Chest CT that is ordered at [facility]

## 2022-03-09 NOTE — TELEPHONE ENCOUNTER
Caller: Luke Irizarry II    Relationship: Self    Best call back number: [phone]    Who are you requesting to speak with (clinical staff, provider,  specific staff member): MARTÍNEZ VICTOR    What was the call regarding: PATIENT STATES THE [facility] IS NEEDING A NEW ORDER FOR THE PATIENT TO HAVE A CT SCAN DONE ON HIS LUNGS.     PATIENT STATES THEY WILL NOT SCHEDULE HIM UNTIL A NEW ORDER IS FAXED TO THEM.     PLEASE ADVISE.

## 2022-03-21 ENCOUNTER — TELEPHONE (OUTPATIENT)
Dept: FAMILY MEDICINE CLINIC | Facility: CLINIC | Age: 52
End: 2022-03-21

## 2022-03-21 ENCOUNTER — OFFICE VISIT (OUTPATIENT)
Dept: FAMILY MEDICINE CLINIC | Facility: CLINIC | Age: 52
End: 2022-03-21

## 2022-03-21 VITALS
TEMPERATURE: 98.7 F | BODY MASS INDEX: 40.5 KG/M2 | DIASTOLIC BLOOD PRESSURE: 88 MMHG | HEIGHT: 72 IN | OXYGEN SATURATION: 99 % | HEART RATE: 91 BPM | SYSTOLIC BLOOD PRESSURE: 142 MMHG | WEIGHT: 299 LBS

## 2022-03-21 DIAGNOSIS — J01.00 ACUTE NON-RECURRENT MAXILLARY SINUSITIS: Primary | ICD-10-CM

## 2022-03-21 PROCEDURE — 99213 OFFICE O/P EST LOW 20 MIN: CPT | Performed by: NURSE PRACTITIONER

## 2022-03-21 RX ORDER — DOXYCYCLINE 100 MG/1
100 CAPSULE ORAL 2 TIMES DAILY
Qty: 20 CAPSULE | Refills: 0 | Status: SHIPPED | OUTPATIENT
Start: 2022-03-21 | End: 2022-05-04

## 2022-03-21 RX ORDER — FLUTICASONE PROPIONATE 50 MCG
2 SPRAY, SUSPENSION (ML) NASAL DAILY
Qty: 16 G | Refills: 5 | Status: SHIPPED | OUTPATIENT
Start: 2022-03-21 | End: 2022-06-20

## 2022-03-21 RX ORDER — BUDESONIDE AND FORMOTEROL FUMARATE DIHYDRATE 160; 4.5 UG/1; UG/1
2 AEROSOL RESPIRATORY (INHALATION)
COMMUNITY
Start: 2022-03-14 | End: 2022-12-14

## 2022-03-21 NOTE — TELEPHONE ENCOUNTER
PATIENT CALLING IN REGARD TO REQUEST AN ANTIBIOTIC FOR POSSIBLE SINUS INFECTION. PLEASE ADVISE THANK YOU!    [facility]/pharmacy #[id number] - [place], [place] - [address] [phone] [facility] [phone] FX

## 2022-03-21 NOTE — PROGRESS NOTES
"Chief Complaint  Sinusitis (Sinus pressure about 4 days, post nasal drip/) and Cough    Subjective          Luke Akil Irizarry II presents to [facility]  History of Present Illness    Patient is here today with complaint of sinus pressure for the last 5 days.  He also reports a cough.  Patient recently had Covid and is having post Covid symptoms and being treated for this.  The symptoms are new to him.    Denies fever, shortness of breath  Reports pressure under eyes are worse.     Mostly dry cough.    OTC: dayquil, alkaseltzer.        Objective   Vital Signs:   /88   Pulse 91   Temp 98.7 °F (37.1 °C)   Ht 182.9 cm (72\")   Wt 136 kg (299 lb)   SpO2 99%   BMI 40.55 kg/m²     Physical Exam  Constitutional:       Appearance: Normal appearance.   HENT:      Head: Normocephalic and atraumatic.      Right Ear: Tympanic membrane has decreased mobility.      Left Ear: Tympanic membrane has decreased mobility.      Nose: Rhinorrhea present.      Right Sinus: Maxillary sinus tenderness present. No frontal sinus tenderness.      Left Sinus: Maxillary sinus tenderness present. No frontal sinus tenderness.      Mouth/Throat:      Comments: Post nasal drainage  Cardiovascular:      Rate and Rhythm: Normal rate and regular rhythm.   Pulmonary:      Effort: Pulmonary effort is normal.      Breath sounds: Normal breath sounds.   Neurological:      Mental Status: He is alert and oriented to person, place, and time.   Psychiatric:         Mood and Affect: Mood normal.         Behavior: Behavior normal.         Thought Content: Thought content normal.         Judgment: Judgment normal.        Result Review :                 Assessment and Plan    Diagnoses and all orders for this visit:    1. Acute non-recurrent maxillary sinusitis (Primary)    Other orders  -     doxycycline (MONODOX) 100 MG capsule; Take 1 capsule by mouth 2 (Two) Times a Day.  Dispense: 20 capsule; Refill: 0  -     " fluticasone (Flonase) 50 MCG/ACT nasal spray; 2 sprays into the nostril(s) as directed by provider Daily.  Dispense: 16 g; Refill: 5      Will treat with antibiotic.  Start Flonase.  If no resolution of symptoms follow-up as needed.  He and wife verbalized understanding.      Follow Up   No follow-ups on file.  Patient was given instructions and counseling regarding his condition or for health maintenance advice. Please see specific information pulled into the AVS if appropriate.

## 2022-03-31 RX ORDER — IPRATROPIUM BROMIDE AND ALBUTEROL SULFATE 2.5; .5 MG/3ML; MG/3ML
3 SOLUTION RESPIRATORY (INHALATION) EVERY 4 HOURS PRN
Qty: 360 ML | Refills: 1 | Status: SHIPPED | OUTPATIENT
Start: 2022-03-31

## 2022-04-27 RX ORDER — LISINOPRIL AND HYDROCHLOROTHIAZIDE 25; 20 MG/1; MG/1
1 TABLET ORAL DAILY
Qty: 90 TABLET | Refills: 0 | Status: SHIPPED | OUTPATIENT
Start: 2022-04-27 | End: 2022-05-04 | Stop reason: SDUPTHER

## 2022-05-04 ENCOUNTER — OFFICE VISIT (OUTPATIENT)
Dept: FAMILY MEDICINE CLINIC | Facility: CLINIC | Age: 52
End: 2022-05-04

## 2022-05-04 VITALS
DIASTOLIC BLOOD PRESSURE: 84 MMHG | BODY MASS INDEX: 40.36 KG/M2 | HEIGHT: 72 IN | OXYGEN SATURATION: 97 % | WEIGHT: 298 LBS | SYSTOLIC BLOOD PRESSURE: 136 MMHG | HEART RATE: 63 BPM | TEMPERATURE: 96.9 F

## 2022-05-04 DIAGNOSIS — I10 BENIGN ESSENTIAL HYPERTENSION: ICD-10-CM

## 2022-05-04 DIAGNOSIS — M79.89 PAIN AND SWELLING OF LEFT LOWER EXTREMITY: ICD-10-CM

## 2022-05-04 DIAGNOSIS — M79.605 PAIN AND SWELLING OF LEFT LOWER EXTREMITY: ICD-10-CM

## 2022-05-04 DIAGNOSIS — J30.9 ALLERGIC RHINITIS, UNSPECIFIED SEASONALITY, UNSPECIFIED TRIGGER: ICD-10-CM

## 2022-05-04 DIAGNOSIS — E66.2 CLASS 3 OBESITY WITH ALVEOLAR HYPOVENTILATION, SERIOUS COMORBIDITY, AND BODY MASS INDEX (BMI) OF 40.0 TO 44.9 IN ADULT: ICD-10-CM

## 2022-05-04 DIAGNOSIS — K21.9 GASTROESOPHAGEAL REFLUX DISEASE WITHOUT ESOPHAGITIS: ICD-10-CM

## 2022-05-04 DIAGNOSIS — Z12.5 SCREENING FOR PROSTATE CANCER: ICD-10-CM

## 2022-05-04 DIAGNOSIS — Z00.01 ENCOUNTER FOR GENERAL ADULT MEDICAL EXAMINATION WITH ABNORMAL FINDINGS: ICD-10-CM

## 2022-05-04 DIAGNOSIS — M51.16 HERNIATION OF LUMBAR INTERVERTEBRAL DISC WITH RADICULOPATHY: ICD-10-CM

## 2022-05-04 DIAGNOSIS — E11.65 TYPE 2 DIABETES MELLITUS WITH HYPERGLYCEMIA, WITHOUT LONG-TERM CURRENT USE OF INSULIN: Primary | ICD-10-CM

## 2022-05-04 PROBLEM — M54.50 CHRONIC LOW BACK PAIN: Status: ACTIVE | Noted: 2018-08-03

## 2022-05-04 PROBLEM — G89.29 CHRONIC LOW BACK PAIN: Status: ACTIVE | Noted: 2018-08-03

## 2022-05-04 LAB
EXPIRATION DATE: ABNORMAL
HBA1C MFR BLD: 6.3 %
Lab: ABNORMAL

## 2022-05-04 PROCEDURE — 83036 HEMOGLOBIN GLYCOSYLATED A1C: CPT | Performed by: NURSE PRACTITIONER

## 2022-05-04 PROCEDURE — 99396 PREV VISIT EST AGE 40-64: CPT | Performed by: NURSE PRACTITIONER

## 2022-05-04 PROCEDURE — 3044F HG A1C LEVEL LT 7.0%: CPT | Performed by: NURSE PRACTITIONER

## 2022-05-04 RX ORDER — LISINOPRIL AND HYDROCHLOROTHIAZIDE 25; 20 MG/1; MG/1
1 TABLET ORAL DAILY
Qty: 90 TABLET | Refills: 0 | Status: SHIPPED | OUTPATIENT
Start: 2022-05-04 | End: 2022-10-24 | Stop reason: SDUPTHER

## 2022-05-04 RX ORDER — MELOXICAM 7.5 MG/1
7.5 TABLET ORAL 2 TIMES DAILY
COMMUNITY
Start: 2022-04-18 | End: 2022-06-20

## 2022-05-04 RX ORDER — ATORVASTATIN CALCIUM 20 MG/1
20 TABLET, FILM COATED ORAL DAILY
Qty: 90 TABLET | Refills: 2 | Status: SHIPPED | OUTPATIENT
Start: 2022-05-04 | End: 2022-06-20

## 2022-05-04 RX ORDER — PANTOPRAZOLE SODIUM 40 MG/1
40 TABLET, DELAYED RELEASE ORAL DAILY
Qty: 90 TABLET | Refills: 3 | Status: SHIPPED | OUTPATIENT
Start: 2022-05-04

## 2022-05-05 ENCOUNTER — TELEPHONE (OUTPATIENT)
Dept: FAMILY MEDICINE CLINIC | Facility: CLINIC | Age: 52
End: 2022-05-05

## 2022-05-05 LAB
25(OH)D3+25(OH)D2 SERPL-MCNC: 22.5 NG/ML (ref 30–100)
ALBUMIN SERPL-MCNC: 4.2 G/DL (ref 3.8–4.9)
ALBUMIN/GLOB SERPL: 1.5 {RATIO} (ref 1.2–2.2)
ALP SERPL-CCNC: 94 IU/L (ref 44–121)
ALT SERPL-CCNC: 53 IU/L (ref 0–44)
AST SERPL-CCNC: 33 IU/L (ref 0–40)
BASOPHILS # BLD AUTO: 0.1 X10E3/UL (ref 0–0.2)
BASOPHILS NFR BLD AUTO: 1 %
BILIRUB SERPL-MCNC: 0.5 MG/DL (ref 0–1.2)
BNP SERPL-MCNC: 3.2 PG/ML (ref 0–100)
BUN SERPL-MCNC: 14 MG/DL (ref 6–24)
BUN/CREAT SERPL: 15 (ref 9–20)
CALCIUM SERPL-MCNC: 9.1 MG/DL (ref 8.7–10.2)
CHLORIDE SERPL-SCNC: 103 MMOL/L (ref 96–106)
CHOLEST SERPL-MCNC: 180 MG/DL (ref 100–199)
CO2 SERPL-SCNC: 22 MMOL/L (ref 20–29)
CREAT SERPL-MCNC: 0.95 MG/DL (ref 0.76–1.27)
EGFRCR SERPLBLD CKD-EPI 2021: 97 ML/MIN/1.73
EOSINOPHIL # BLD AUTO: 0.3 X10E3/UL (ref 0–0.4)
EOSINOPHIL NFR BLD AUTO: 3 %
ERYTHROCYTE [DISTWIDTH] IN BLOOD BY AUTOMATED COUNT: 13.3 % (ref 11.6–15.4)
GLOBULIN SER CALC-MCNC: 2.8 G/DL (ref 1.5–4.5)
GLUCOSE SERPL-MCNC: 139 MG/DL (ref 65–99)
HCT VFR BLD AUTO: 44.2 % (ref 37.5–51)
HDLC SERPL-MCNC: 45 MG/DL
HGB BLD-MCNC: 14.9 G/DL (ref 13–17.7)
IMM GRANULOCYTES # BLD AUTO: 0 X10E3/UL (ref 0–0.1)
IMM GRANULOCYTES NFR BLD AUTO: 0 %
LDLC SERPL CALC-MCNC: 108 MG/DL (ref 0–99)
LYMPHOCYTES # BLD AUTO: 2.7 X10E3/UL (ref 0.7–3.1)
LYMPHOCYTES NFR BLD AUTO: 34 %
MAGNESIUM SERPL-MCNC: 1.8 MG/DL (ref 1.6–2.3)
MCH RBC QN AUTO: 28.5 PG (ref 26.6–33)
MCHC RBC AUTO-ENTMCNC: 33.7 G/DL (ref 31.5–35.7)
MCV RBC AUTO: 85 FL (ref 79–97)
MONOCYTES # BLD AUTO: 0.4 X10E3/UL (ref 0.1–0.9)
MONOCYTES NFR BLD AUTO: 5 %
NEUTROPHILS # BLD AUTO: 4.4 X10E3/UL (ref 1.4–7)
NEUTROPHILS NFR BLD AUTO: 57 %
PLATELET # BLD AUTO: 247 X10E3/UL (ref 150–450)
POTASSIUM SERPL-SCNC: 4.1 MMOL/L (ref 3.5–5.2)
PROT SERPL-MCNC: 7 G/DL (ref 6–8.5)
PSA SERPL-MCNC: 0.5 NG/ML (ref 0–4)
RBC # BLD AUTO: 5.23 X10E6/UL (ref 4.14–5.8)
SODIUM SERPL-SCNC: 141 MMOL/L (ref 134–144)
TRIGL SERPL-MCNC: 154 MG/DL (ref 0–149)
TSH SERPL DL<=0.005 MIU/L-ACNC: 2.87 UIU/ML (ref 0.45–4.5)
VIT B12 SERPL-MCNC: 564 PG/ML (ref 232–1245)
VLDLC SERPL CALC-MCNC: 27 MG/DL (ref 5–40)
WBC # BLD AUTO: 7.9 X10E3/UL (ref 3.4–10.8)

## 2022-05-05 RX ORDER — ERGOCALCIFEROL 1.25 MG/1
50000 CAPSULE ORAL WEEKLY
Qty: 5 CAPSULE | Refills: 5 | Status: SHIPPED | OUTPATIENT
Start: 2022-05-05

## 2022-05-05 NOTE — PROGRESS NOTES
Please call the patient regarding his lab results.  Labs are stable.  No signs of heart issues with swelling.  PSA was normal.  Magnesium and B12 are normal.  Vitamin D slightly low.  I am sending in supplement to start.

## 2022-06-29 ENCOUNTER — TELEPHONE (OUTPATIENT)
Dept: URGENT CARE | Facility: CLINIC | Age: 52
End: 2022-06-29

## 2022-06-29 NOTE — TELEPHONE ENCOUNTER
We received a PA for Albuterol HFA.  With a Good Rx card they can go to [facility] and get one for under $10.00 or [facility] under $14.00.  This is per looking prices up at the [employer] site.  RITA Robles CMA

## 2022-07-27 ENCOUNTER — OFFICE VISIT (OUTPATIENT)
Dept: ORTHOPEDIC SURGERY | Facility: CLINIC | Age: 52
End: 2022-07-27

## 2022-07-27 VITALS — BODY MASS INDEX: 39.01 KG/M2 | WEIGHT: 288 LBS | HEIGHT: 72 IN

## 2022-07-27 DIAGNOSIS — M94.261 CHONDROMALACIA OF RIGHT KNEE: ICD-10-CM

## 2022-07-27 DIAGNOSIS — S83.411A TEAR OF MEDIAL COLLATERAL LIGAMENT OF RIGHT KNEE, INITIAL ENCOUNTER: ICD-10-CM

## 2022-07-27 DIAGNOSIS — S83.521A RUPTURE OF POSTERIOR CRUCIATE LIGAMENT OF RIGHT KNEE, INITIAL ENCOUNTER: ICD-10-CM

## 2022-07-27 DIAGNOSIS — M17.11 PRIMARY OSTEOARTHRITIS OF RIGHT KNEE: Primary | ICD-10-CM

## 2022-07-27 PROCEDURE — 99213 OFFICE O/P EST LOW 20 MIN: CPT | Performed by: ORTHOPAEDIC SURGERY

## 2022-07-27 PROCEDURE — 73562 X-RAY EXAM OF KNEE 3: CPT | Performed by: ORTHOPAEDIC SURGERY

## 2022-07-27 PROCEDURE — 20610 DRAIN/INJ JOINT/BURSA W/O US: CPT | Performed by: ORTHOPAEDIC SURGERY

## 2022-07-27 RX ORDER — MELOXICAM 7.5 MG/1
7.5 TABLET ORAL 2 TIMES DAILY
COMMUNITY
Start: 2022-06-24 | End: 2022-09-23

## 2022-07-27 RX ORDER — ATORVASTATIN CALCIUM 20 MG/1
20 TABLET, FILM COATED ORAL DAILY
COMMUNITY
Start: 2022-05-15 | End: 2022-09-23

## 2022-07-27 RX ADMIN — LIDOCAINE HYDROCHLORIDE 8 ML: 10 INJECTION, SOLUTION EPIDURAL; INFILTRATION; INTRACAUDAL; PERINEURAL at 09:55

## 2022-07-27 RX ADMIN — TRIAMCINOLONE ACETONIDE 80 MG: 40 INJECTION, SUSPENSION INTRA-ARTICULAR; INTRAMUSCULAR at 09:55

## 2022-07-27 NOTE — PROGRESS NOTES
Subjective:     Patient ID: Luke Irizarry II is a 52 y.o. male.    Chief Complaint:  Follow-up right knee pain, PCL tear, chondromalacia, MCL strain  Plan last visit-strengthening, continued use of PCL brace    History of Present Illness  Luke Irizarry II returns to clinic today for evaluation of right knee pain.    The patient complains of fairly significant pain that waxes and wanes. He rates his pain 8 to 9 out of 10 at its worst, localized to the anteromedial aspect of his right knee. He does note still some issues with instability. The patient feels like his brace actually makes it worse, so he only wears it on an occasional basis. He denies any vanita locking or catching. He denies any numbness or tingling.     Social History     Occupational History   • Not on file   Tobacco Use   • Smoking status: Former Smoker     Packs/day: 1.00     Years: 7.00     Pack years: 7.00     Types: Cigarettes     Quit date:      Years since quittin.5   • Smokeless tobacco: Never Used   Vaping Use   • Vaping Use: Never used   Substance and Sexual Activity   • Alcohol use: No   • Drug use: Never   • Sexual activity: Yes     Partners: Female      Past Medical History:   Diagnosis Date   • Arthritis     RIGHT HAND   • Diabetes mellitus (HCC)    • GERD (gastroesophageal reflux disease)    • H/O wisdom tooth extraction    • Hearing deficit    • Hypertension    • Knee sprain    • Knee swelling    • Migraines    • Right hip pain     sched BRAD   • Sleep apnea     no machine, states needs to have new study   • Spinal headache     AFTER SPINAL TAP REQUIRED BLOOD PATCH     Past Surgical History:   Procedure Laterality Date   • APPENDECTOMY     • BACK SURGERY      X 2   • BACK SURGERY  2020   • CATARACT EXTRACTION      RIGHT EYE CATARACT REMOVED   • JOINT REPLACEMENT     • KNEE SURGERY Left    • LAPAROSCOPIC CHOLECYSTECTOMY     • SHOULDER SURGERY Bilateral     X 2 Dr. Houston   • THYROIDECTOMY, PARTIAL     • TOTAL  "HIP ARTHROPLASTY Left 6/21/2016    Procedure: TOTAL HIP ARTHROPLASTY;  Surgeon: Sanchez Goel MD;  Location:  [facility];  Service:    • TOTAL HIP ARTHROPLASTY Right 8/16/2016    Procedure: TOTAL HIP ARTHROPLASTY depuy summit 7fr hemovac drain placement;  Surgeon: Sanchez Goel MD;  Location:  [facility] OR;  Service:    • WRIST SURGERY Left        Family History   Problem Relation Age of Onset   • Lung disease Father    • COPD Father    • Cancer Sister    • Cancer Paternal Aunt    • Diabetes Maternal Grandmother    • Diabetes Maternal Grandfather    • Diabetes Paternal Grandmother    • Diabetes Paternal Grandfather          Review of Systems        Objective:  Vitals:    07/27/22 0915   Weight: 131 kg (288 lb)   Height: 182.9 cm (72\")         07/27/22 0915   Weight: 131 kg (288 lb)     Body mass index is 39.06 kg/m².  Physical Exam    Vital signs reviewed.   General: No acute distress, alert and oriented  Eyes: conjunctiva clear; pupils equally round and reactive  ENT: external ears and nose atraumatic; oropharynx clear  CV: no peripheral edema  Resp: normal respiratory effort  Skin: no rashes or wounds; normal turgor  Psych: mood and affect appropriate; recent and remote memory intact          Ortho Exam     Right Knee-    Active ROM 0 to 125 degrees  4+/5 on flexion  4+/5 on extension  Moderately tender along medial joint line and tenderness along medial and lateral patellar facet noted as well.  Effusion- Moderate  Grade 1A Lachman  Anterior drawer- Grade 2  Posterior drawer- Grade 2  Grade 2 opening on valgus stress at 30  Active patellar compression test- Positive  Brisk cap refill all digits  2+ dorsalis pedis pulse  Imaging:  Right Knee X-Ray  Indication: Pain    AP, Lateral, and [place] views    Findings:  No fracture  No bony lesion  Normal soft tissues  Advanced medial compartment joint space narrowing with overall varus alignment noted, mild reactive osteophyte formation, mineralization in the proximal MCL " noted, moderate patellofemoral joint space narrowing    .  Compared to prior office X-rays    Assessment:        1. Primary osteoarthritis of right knee    2. Tear of medial collateral ligament of right knee, initial encounter    3. Rupture of posterior cruciate ligament of right knee, initial encounter    4. Chondromalacia of right knee           Plan:  Large Joint Arthrocentesis: R knee  Date/Time: 7/27/2022 9:55 AM  Consent given by: patient  Site marked: site marked  Timeout: Immediately prior to procedure a time out was called to verify the correct patient, procedure, equipment, support staff and site/side marked as required   Supporting Documentation  Indications: pain   Procedure Details  Location: knee - R knee  Preparation: Patient was prepped and draped in the usual sterile fashion  Needle size: 22 G  Approach: anterolateral  Medications administered: 8 mL lidocaine PF 1% 1 %; 80 mg triamcinolone acetonide 40 MG/ML  Patient tolerance: patient tolerated the procedure well with no immediate complications                1. Discussed treatment options at length with patient at today's visit including observation, continue, intra-articular injection, and total knee arthroplasty given his significant instability as well as advancement in his chondral wear of his medial femoral. At this point in time, he wants to hold off on surgery and would like to proceed with injection today.  2. Patient would like to proceed with cortisone injection today to the right knee. Recommended limited use of affected extremity for the next 24 hours to only essential activites other than work on general active and passive motion. Recommended supplementing with ice and soft tissue massage. Discussed with patient that they should see results in 5-7 days, if no improvement in 5-6 weeks I have asked them to call the office to review other options. Patient should call office immediately if they notice redness, warmth, fevers, chills, or  residual numbness or tingling for greater than 6 hours after injection.   3. Continue with bracing for support.  4. Follow up: next field available appointment for evaluation of left knee with x-rays.      Luke Irizarry II was in agreement with plan and had all questions answered.     Orders:  Orders Placed This Encounter   Procedures   • Large Joint Arthrocentesis: R knee   • XR Knee 3 View Right       Medications:  No orders of the defined types were placed in this encounter.      Followup:  No follow-ups on file.    Diagnoses and all orders for this visit:    1. Primary osteoarthritis of right knee (Primary)  -     XR Knee 3 View Right    2. Tear of medial collateral ligament of right knee, initial encounter    3. Rupture of posterior cruciate ligament of right knee, initial encounter    4. Chondromalacia of right knee    Other orders  -     Large Joint Arthrocentesis: R knee          Dictated utilizing Dragon dictation     Transcribed from ambient dictation for Binh Tavarez MD by Qian Garcia.  07/27/22   13:13 EDT    Patient verbalized consent to the visit recording.  I have personally performed the services described in this document as transcribed by the above individual, and it is both accurate and complete.  Binh Tavarez MD  7/28/2022  22:51 EDT

## 2022-07-28 RX ORDER — TRIAMCINOLONE ACETONIDE 40 MG/ML
80 INJECTION, SUSPENSION INTRA-ARTICULAR; INTRAMUSCULAR
Status: COMPLETED | OUTPATIENT
Start: 2022-07-27 | End: 2022-07-27

## 2022-07-28 RX ORDER — LIDOCAINE HYDROCHLORIDE 10 MG/ML
8 INJECTION, SOLUTION EPIDURAL; INFILTRATION; INTRACAUDAL; PERINEURAL
Status: COMPLETED | OUTPATIENT
Start: 2022-07-27 | End: 2022-07-27

## 2022-10-24 RX ORDER — LISINOPRIL 10 MG/1
10 TABLET ORAL DAILY
Qty: 30 TABLET | Refills: 0 | Status: SHIPPED | OUTPATIENT
Start: 2022-10-24

## 2022-10-24 RX ORDER — LISINOPRIL AND HYDROCHLOROTHIAZIDE 25; 20 MG/1; MG/1
TABLET ORAL
Qty: 90 TABLET | Refills: 0 | OUTPATIENT
Start: 2022-10-24

## 2022-11-07 ENCOUNTER — TRANSCRIBE ORDERS (OUTPATIENT)
Dept: ADMINISTRATIVE | Facility: HOSPITAL | Age: 52
End: 2022-11-07

## 2022-11-07 DIAGNOSIS — R06.00 DYSPNEA, UNSPECIFIED TYPE: Primary | ICD-10-CM

## 2022-12-14 ENCOUNTER — OFFICE VISIT (OUTPATIENT)
Dept: ORTHOPEDIC SURGERY | Facility: CLINIC | Age: 52
End: 2022-12-14

## 2022-12-14 VITALS
HEART RATE: 76 BPM | WEIGHT: 290 LBS | HEIGHT: 73 IN | BODY MASS INDEX: 38.43 KG/M2 | SYSTOLIC BLOOD PRESSURE: 132 MMHG | DIASTOLIC BLOOD PRESSURE: 91 MMHG

## 2022-12-14 DIAGNOSIS — S83.411A TEAR OF MEDIAL COLLATERAL LIGAMENT OF RIGHT KNEE, INITIAL ENCOUNTER: ICD-10-CM

## 2022-12-14 DIAGNOSIS — S83.521A RUPTURE OF POSTERIOR CRUCIATE LIGAMENT OF RIGHT KNEE, INITIAL ENCOUNTER: ICD-10-CM

## 2022-12-14 DIAGNOSIS — M25.562 LEFT KNEE PAIN, UNSPECIFIED CHRONICITY: Primary | ICD-10-CM

## 2022-12-14 DIAGNOSIS — M17.11 PRIMARY OSTEOARTHRITIS OF RIGHT KNEE: ICD-10-CM

## 2022-12-14 DIAGNOSIS — M94.262 CHONDROMALACIA OF KNEE, LEFT: ICD-10-CM

## 2022-12-14 PROCEDURE — 99214 OFFICE O/P EST MOD 30 MIN: CPT | Performed by: ORTHOPAEDIC SURGERY

## 2022-12-14 PROCEDURE — 73562 X-RAY EXAM OF KNEE 3: CPT | Performed by: ORTHOPAEDIC SURGERY

## 2022-12-14 PROCEDURE — 20610 DRAIN/INJ JOINT/BURSA W/O US: CPT | Performed by: ORTHOPAEDIC SURGERY

## 2022-12-14 RX ORDER — HYDROCODONE BITARTRATE AND ACETAMINOPHEN 7.5; 325 MG/1; MG/1
TABLET ORAL
COMMUNITY
Start: 2022-11-09 | End: 2022-12-14

## 2022-12-14 RX ORDER — ALBUTEROL SULFATE 90 UG/1
2 AEROSOL, METERED RESPIRATORY (INHALATION)
COMMUNITY

## 2022-12-14 RX ORDER — ATORVASTATIN CALCIUM 20 MG/1
20 TABLET, FILM COATED ORAL DAILY
COMMUNITY
Start: 2022-11-14 | End: 2023-02-09

## 2022-12-14 RX ORDER — AMITRIPTYLINE HYDROCHLORIDE 75 MG/1
TABLET, FILM COATED ORAL
COMMUNITY
Start: 2022-12-13

## 2022-12-14 RX ORDER — AMLODIPINE BESYLATE 10 MG/1
TABLET ORAL
COMMUNITY
Start: 2022-10-23

## 2022-12-14 RX ADMIN — LIDOCAINE HYDROCHLORIDE 8 ML: 10 INJECTION, SOLUTION EPIDURAL; INFILTRATION; INTRACAUDAL; PERINEURAL at 09:55

## 2022-12-14 RX ADMIN — TRIAMCINOLONE ACETONIDE 80 MG: 40 INJECTION, SUSPENSION INTRA-ARTICULAR; INTRAMUSCULAR at 09:55
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
## 2022-12-29 RX ORDER — LIDOCAINE HYDROCHLORIDE 10 MG/ML
8 INJECTION, SOLUTION EPIDURAL; INFILTRATION; INTRACAUDAL; PERINEURAL
Status: COMPLETED | OUTPATIENT
Start: 2022-12-14 | End: 2022-12-14

## 2022-12-29 RX ORDER — TRIAMCINOLONE ACETONIDE 40 MG/ML
80 INJECTION, SUSPENSION INTRA-ARTICULAR; INTRAMUSCULAR
Status: COMPLETED | OUTPATIENT
Start: 2022-12-14 | End: 2022-12-14

## 2023-01-17 ENCOUNTER — HOSPITAL ENCOUNTER (OUTPATIENT)
Dept: CT IMAGING | Facility: HOSPITAL | Age: 53
End: 2023-01-17
Payer: MEDICAID

## 2023-02-09 RX ORDER — ATORVASTATIN CALCIUM 20 MG/1
TABLET, FILM COATED ORAL
Qty: 90 TABLET | Refills: 2 | Status: SHIPPED | OUTPATIENT
Start: 2023-02-09

## 2023-03-13 ENCOUNTER — OFFICE VISIT (OUTPATIENT)
Dept: ORTHOPEDIC SURGERY | Facility: CLINIC | Age: 53
End: 2023-03-13
Payer: MEDICAID

## 2023-03-13 VITALS — WEIGHT: 276 LBS | BODY MASS INDEX: 36.58 KG/M2 | HEIGHT: 73 IN

## 2023-03-13 DIAGNOSIS — M17.11 PRIMARY OSTEOARTHRITIS OF RIGHT KNEE: Primary | ICD-10-CM

## 2023-03-13 DIAGNOSIS — S83.521A RUPTURE OF POSTERIOR CRUCIATE LIGAMENT OF RIGHT KNEE, INITIAL ENCOUNTER: ICD-10-CM

## 2023-03-13 DIAGNOSIS — S83.411A TEAR OF MEDIAL COLLATERAL LIGAMENT OF RIGHT KNEE, INITIAL ENCOUNTER: ICD-10-CM

## 2023-03-13 DIAGNOSIS — M94.262 CHONDROMALACIA OF KNEE, LEFT: ICD-10-CM

## 2023-03-13 PROCEDURE — 1160F RVW MEDS BY RX/DR IN RCRD: CPT | Performed by: ORTHOPAEDIC SURGERY

## 2023-03-13 PROCEDURE — 1159F MED LIST DOCD IN RCRD: CPT | Performed by: ORTHOPAEDIC SURGERY

## 2023-03-13 PROCEDURE — 99214 OFFICE O/P EST MOD 30 MIN: CPT | Performed by: ORTHOPAEDIC SURGERY

## 2023-03-13 RX ORDER — GABAPENTIN 300 MG/1
CAPSULE ORAL
COMMUNITY
Start: 2023-03-01

## 2023-03-13 NOTE — PROGRESS NOTES
Subjective:     Patient ID: Luke Irizarry II is a 53 y.o. male.    Chief Complaint:  Follow-up bilateral knee pain   Right knee PCL tear, MCL tear, DJD   Left knee chondromalacia  Last injection-bilateral knee-2022-cortisone    History of Present Illness  Luke Irizarry II returns to clinic today for evaluation of continued right knee pain that has gotten progressively worse over the last 2 to 3 months. He had very little improvement with his intra-articular injection in the right knee. His left knee has actually done fairly well with his injection. He is having to use a cane for stability due to pain as well as instability to the knee itself. He rates the pain as 8/10 to 9/10, exacerbated with activities of daily living and weightbearing. He notes issues even when walking on flat surfaces, even for short distances. Pain is worse when getting up from a seated position in particular. He denies any new numbness or tingling to his right lower extremity. No fevers, chills, or sweats. No significant weight loss or gain in the last 6 months.     Social History     Occupational History   • Not on file   Tobacco Use   • Smoking status: Former     Packs/day: 1.00     Years: 7.00     Pack years: 7.00     Types: Cigarettes     Quit date:      Years since quittin.2   • Smokeless tobacco: Never   Vaping Use   • Vaping Use: Never used   Substance and Sexual Activity   • Alcohol use: No   • Drug use: Never   • Sexual activity: Yes     Partners: Female      Past Medical History:   Diagnosis Date   • Arthritis     RIGHT HAND   • Diabetes mellitus (HCC)    • GERD (gastroesophageal reflux disease)    • H/O wisdom tooth extraction    • Hearing deficit    • Hypertension    • Knee sprain    • Knee swelling    • Migraines    • Right hip pain     sched BRAD   • Sleep apnea     no machine, states needs to have new study   • Spinal headache     AFTER SPINAL TAP REQUIRED BLOOD PATCH     Past Surgical History:  "  Procedure Laterality Date   • APPENDECTOMY     • BACK SURGERY      X 2   • BACK SURGERY  12/07/2020   • CATARACT EXTRACTION      RIGHT EYE CATARACT REMOVED   • JOINT REPLACEMENT     • KNEE SURGERY Left 2011   • LAPAROSCOPIC CHOLECYSTECTOMY     • SHOULDER SURGERY Bilateral     X 2 Dr. Houston   • THYROIDECTOMY, PARTIAL     • TOTAL HIP ARTHROPLASTY Left 6/21/2016    Procedure: TOTAL HIP ARTHROPLASTY;  Surgeon: Sanchez Goel MD;  Location:  [facility];  Service:    • TOTAL HIP ARTHROPLASTY Right 8/16/2016    Procedure: TOTAL HIP ARTHROPLASTY depuy [place] 7fr hemovac drain placement;  Surgeon: Sanchez Goel MD;  Location:  [facility];  Service:    • WRIST SURGERY Left        Family History   Problem Relation Age of Onset   • Lung disease Father    • COPD Father    • Cancer Sister    • Cancer Paternal Aunt    • Diabetes Maternal Grandmother    • Diabetes Maternal Grandfather    • Diabetes Paternal Grandmother    • Diabetes Paternal Grandfather          Review of Systems        Objective:  Vitals:    03/13/23 0932   Weight: 125 kg (276 lb)   Height: 185.4 cm (73\")         03/13/23 0932   Weight: 125 kg (276 lb)     Body mass index is 36.41 kg/m².  Physical Exam    Vital signs reviewed.   General: No acute distress, alert and oriented  Eyes: conjunctiva clear; pupils equally round and reactive  ENT: external ears and nose atraumatic; oropharynx clear  CV: no peripheral edema  Resp: normal respiratory effort  Skin: no rashes or wounds; normal turgor  Psych: mood and affect appropriate; recent and remote memory intact          Ortho Exam     Right Knee-    ROM 3 to 120 degrees  4+/5 on flexion  4+/5 on extension  Maximal tenderness to palpation medial joint line as well as medial and lateral patellar facet  Effusion- Moderate  Posterior drawer- Grade 2 with mild to moderate endpoint  Grade 2 opening on valgus stress at 30 degrees  Grade 1 opening on valgus stress at 0  Active patellar compression test- Positive  Positive " sensation light touch all distributions symmetric to contralateral side  Brisk cap refill all digits  1+ dorsalis pedis pulse    Imaging:  None today  Assessment:        1. Primary osteoarthritis of right knee    2. Tear of medial collateral ligament of right knee, initial encounter    3. Rupture of posterior cruciate ligament of right knee, initial encounter    4. Chondromalacia of knee, left           Plan:          1. Discussed treatment options at length with patient at today's visit.   2. He does have end stage arthritic change to his medial compartment, even noted on his x-rays from 07/2022. He is having significant pain on that side, which has failed to respond to bracing, anti-inflammatory medications, intra-articular injections, and physical therapy and is requiring assistance of cane for flat ground short distance ambulation. We discussed options. At this point in time, he would like to proceed with right total knee arthroplasty.  3. I reviewed anatomy and a model of a total knee arthroplasty, as well as typical postoperative recovery of 6-12 months before maximal recovery, and possible need for rehabilitation stay after hospitalization. We also discussed risks, benefits, and alternatives of procedure with risks including but not limited to neurovascular damage, bleeding, infection, malalignment, chronic pain, failure of implants, osteolysis, loosening of implants, loss of motion, weakness, stiffness, instability, DVT, pulmonary embolus, death, stroke, complex regional pain syndrome, myocardial infarction, and need for additional procedures. Patient understood all these and had all questions answered before consenting to the procedure. No guarantees were given in regards to results from the surgery. We will have patient medically optimized by their primary care physician and plan on proceeding with surgery at next available date.  4. We will plan on using a posterior stabilized component given his chronic  PCL laxity as well as medial laxity.  5. The patient has a history of DVT or pulmonary embolus. Denies cardiac history. He does have history of prior MRSA infection. He does have diabetes. We will plan for observation and stay overnight given prior history of MRSA and desire for IV antibiotic administration for 24 hours.      Luke Barnard Juan C BILLINGS was in agreement with plan and had all questions answered.     Orders:  No orders of the defined types were placed in this encounter.      Medications:  No orders of the defined types were placed in this encounter.      Followup:  Return for preop visit.    Diagnoses and all orders for this visit:    1. Primary osteoarthritis of right knee (Primary)    2. Tear of medial collateral ligament of right knee, initial encounter    3. Rupture of posterior cruciate ligament of right knee, initial encounter    4. Chondromalacia of knee, left      Transcribed from ambient dictation for Binh Tavarez MD by Regla Shah.  03/13/23   10:22 EDT    Patient or patient representative verbalized consent to the visit recording.  I have personally performed the services described in this document as transcribed by the above individual, and it is both accurate and complete.      Dictated utilizing Dragon dictation

## 2023-03-21 ENCOUNTER — HOSPITAL ENCOUNTER (OUTPATIENT)
Dept: CT IMAGING | Facility: HOSPITAL | Age: 53
Discharge: HOME | End: 2023-03-21
Admitting: INTERNAL MEDICINE
Payer: MEDICAID

## 2023-03-21 DIAGNOSIS — R06.00 DYSPNEA, UNSPECIFIED TYPE: ICD-10-CM

## 2023-03-21 PROCEDURE — 71250 CT THORAX DX C-: CPT

## 2023-03-21 RX ORDER — ACETAMINOPHEN 325 MG/1
1000 TABLET ORAL ONCE
OUTPATIENT
Start: 2023-03-21 | End: 2023-03-21

## 2023-03-21 RX ORDER — PREGABALIN 150 MG/1
150 CAPSULE ORAL ONCE
OUTPATIENT
Start: 2023-03-21 | End: 2023-03-21

## 2023-03-21 RX ORDER — MELOXICAM 7.5 MG/1
15 TABLET ORAL ONCE
OUTPATIENT
Start: 2023-03-21 | End: 2023-03-21

## 2023-04-12 RX ORDER — LINAGLIPTIN 5 MG/1
TABLET, FILM COATED ORAL
Qty: 30 TABLET | Refills: 0 | Status: SHIPPED | OUTPATIENT
Start: 2023-04-12

## 2023-08-26 ENCOUNTER — APPOINTMENT (OUTPATIENT)
Dept: GENERAL RADIOLOGY | Facility: HOSPITAL | Age: 53
End: 2023-08-26
Payer: MEDICARE

## 2023-08-26 ENCOUNTER — HOSPITAL ENCOUNTER (EMERGENCY)
Facility: HOSPITAL | Age: 53
Discharge: HOME | End: 2023-08-26
Attending: STUDENT IN AN ORGANIZED HEALTH CARE EDUCATION/TRAINING PROGRAM
Payer: MEDICARE

## 2023-08-26 VITALS
HEIGHT: 72 IN | WEIGHT: 300 LBS | SYSTOLIC BLOOD PRESSURE: 141 MMHG | DIASTOLIC BLOOD PRESSURE: 76 MMHG | OXYGEN SATURATION: 99 % | HEART RATE: 72 BPM | RESPIRATION RATE: 18 BRPM | BODY MASS INDEX: 40.63 KG/M2 | TEMPERATURE: 98.7 F

## 2023-08-26 DIAGNOSIS — J40 BRONCHITIS: Primary | ICD-10-CM

## 2023-08-26 LAB
ALBUMIN SERPL-MCNC: 4.5 G/DL (ref 3.5–5.2)
ALBUMIN/GLOB SERPL: 1.6 G/DL
ALP SERPL-CCNC: 136 U/L (ref 39–117)
ALT SERPL W P-5'-P-CCNC: 51 U/L (ref 1–41)
ANION GAP SERPL CALCULATED.3IONS-SCNC: 12.1 MMOL/L (ref 5–15)
AST SERPL-CCNC: 33 U/L (ref 1–40)
BASOPHILS # BLD AUTO: 0.1 10*3/MM3 (ref 0–0.2)
BASOPHILS NFR BLD AUTO: 1.1 % (ref 0–1.5)
BILIRUB SERPL-MCNC: 0.4 MG/DL (ref 0–1.2)
BUN SERPL-MCNC: 14 MG/DL (ref 6–20)
BUN/CREAT SERPL: 14.4 (ref 7–25)
CALCIUM SPEC-SCNC: 9 MG/DL (ref 8.6–10.5)
CHLORIDE SERPL-SCNC: 103 MMOL/L (ref 98–107)
CO2 SERPL-SCNC: 20.9 MMOL/L (ref 22–29)
CREAT SERPL-MCNC: 0.97 MG/DL (ref 0.76–1.27)
DEPRECATED RDW RBC AUTO: 41 FL (ref 37–54)
EGFRCR SERPLBLD CKD-EPI 2021: 93.9 ML/MIN/1.73
EOSINOPHIL # BLD AUTO: 0.51 10*3/MM3 (ref 0–0.4)
EOSINOPHIL NFR BLD AUTO: 5.6 % (ref 0.3–6.2)
ERYTHROCYTE [DISTWIDTH] IN BLOOD BY AUTOMATED COUNT: 13.2 % (ref 12.3–15.4)
FLUAV RNA RESP QL NAA+PROBE: NOT DETECTED
FLUBV RNA RESP QL NAA+PROBE: NOT DETECTED
GLOBULIN UR ELPH-MCNC: 2.9 GM/DL
GLUCOSE SERPL-MCNC: 193 MG/DL (ref 65–99)
HCT VFR BLD AUTO: 41.9 % (ref 37.5–51)
HGB BLD-MCNC: 14 G/DL (ref 13–17.7)
IMM GRANULOCYTES # BLD AUTO: 0.04 10*3/MM3 (ref 0–0.05)
IMM GRANULOCYTES NFR BLD AUTO: 0.4 % (ref 0–0.5)
LYMPHOCYTES # BLD AUTO: 3.32 10*3/MM3 (ref 0.7–3.1)
LYMPHOCYTES NFR BLD AUTO: 36.2 % (ref 19.6–45.3)
MCH RBC QN AUTO: 28.7 PG (ref 26.6–33)
MCHC RBC AUTO-ENTMCNC: 33.4 G/DL (ref 31.5–35.7)
MCV RBC AUTO: 85.9 FL (ref 79–97)
MONOCYTES # BLD AUTO: 0.56 10*3/MM3 (ref 0.1–0.9)
MONOCYTES NFR BLD AUTO: 6.1 % (ref 5–12)
NEUTROPHILS NFR BLD AUTO: 4.63 10*3/MM3 (ref 1.7–7)
NEUTROPHILS NFR BLD AUTO: 50.6 % (ref 42.7–76)
NRBC BLD AUTO-RTO: 0 /100 WBC (ref 0–0.2)
PLATELET # BLD AUTO: 258 10*3/MM3 (ref 140–450)
PMV BLD AUTO: 11.2 FL (ref 6–12)
POTASSIUM SERPL-SCNC: 4 MMOL/L (ref 3.5–5.2)
PROCALCITONIN SERPL-MCNC: 0.06 NG/ML (ref 0–0.25)
PROT SERPL-MCNC: 7.4 G/DL (ref 6–8.5)
RBC # BLD AUTO: 4.88 10*6/MM3 (ref 4.14–5.8)
SARS-COV-2 RNA RESP QL NAA+PROBE: NOT DETECTED
SODIUM SERPL-SCNC: 136 MMOL/L (ref 136–145)
TROPONIN T SERPL HS-MCNC: <6 NG/L
WBC NRBC COR # BLD: 9.16 10*3/MM3 (ref 3.4–10.8)

## 2023-08-26 PROCEDURE — 71046 X-RAY EXAM CHEST 2 VIEWS: CPT

## 2023-08-26 PROCEDURE — 87636 SARSCOV2 & INF A&B AMP PRB: CPT | Performed by: STUDENT IN AN ORGANIZED HEALTH CARE EDUCATION/TRAINING PROGRAM

## 2023-08-26 PROCEDURE — 99284 EMERGENCY DEPT VISIT MOD MDM: CPT

## 2023-08-26 PROCEDURE — 85025 COMPLETE CBC W/AUTO DIFF WBC: CPT | Performed by: STUDENT IN AN ORGANIZED HEALTH CARE EDUCATION/TRAINING PROGRAM

## 2023-08-26 PROCEDURE — 84484 ASSAY OF TROPONIN QUANT: CPT | Performed by: STUDENT IN AN ORGANIZED HEALTH CARE EDUCATION/TRAINING PROGRAM

## 2023-08-26 PROCEDURE — 80053 COMPREHEN METABOLIC PANEL: CPT | Performed by: STUDENT IN AN ORGANIZED HEALTH CARE EDUCATION/TRAINING PROGRAM

## 2023-08-26 PROCEDURE — 93005 ELECTROCARDIOGRAM TRACING: CPT | Performed by: STUDENT IN AN ORGANIZED HEALTH CARE EDUCATION/TRAINING PROGRAM

## 2023-08-26 PROCEDURE — 84145 PROCALCITONIN (PCT): CPT | Performed by: STUDENT IN AN ORGANIZED HEALTH CARE EDUCATION/TRAINING PROGRAM

## 2023-08-26 PROCEDURE — 93010 ELECTROCARDIOGRAM REPORT: CPT | Performed by: INTERNAL MEDICINE

## 2023-08-27 LAB
QT INTERVAL: 413 MS
QTC INTERVAL: 431 MS

## 2023-08-27 NOTE — ED PROVIDER NOTES
"Subjective   History of Present Illness  Pt is a 53 y.o. male with PMH as listed who presents for   Chief Complaint   Patient presents with    Cough    Shortness of Breath       Patient presents for cough, shortness of breath, chest pain x3 days.  States that he has been having rhinorrhea and congestion for the past few days as well.  He was seen at urgent care on Thursday and was placed on antibiotics for sinusitis, he has been compliant with these medications but states that he feels that his \"moved to his chest\".  Denies any recent travel, cancer history, history of blood clots, or any other risk factors for possible PE.  Denies smoking, no family history of cardiac problems.   chest pain is located in the right side of his chest, is nonradiating, no specific exacerbating relieving factors.  No other new complaints at this time.    Review of Systems    Past Medical History:   Diagnosis Date    Arthritis     RIGHT HAND    Diabetes mellitus     GERD (gastroesophageal reflux disease)     H/O wisdom tooth extraction     Hearing deficit     Hypertension     Knee sprain     Knee swelling     Migraines     Right hip pain     sched BRAD    Sleep apnea     no machine, states needs to have new study    Spinal headache     AFTER SPINAL TAP REQUIRED BLOOD PATCH       Allergies   Allergen Reactions    Amoxicillin-Pot Clavulanate GI Intolerance, Unknown - High Severity and Other (See Comments)    Lisinopril Cough     Annotation - [id number]: cough    Adhesive Tape Rash     Other reaction(s): Blisters    Tape Rash, Unknown - High Severity and Other (See Comments)       Past Surgical History:   Procedure Laterality Date    APPENDECTOMY      BACK SURGERY      X 2    BACK SURGERY  12/07/2020    CATARACT EXTRACTION      RIGHT EYE CATARACT REMOVED    JOINT REPLACEMENT      KNEE SURGERY Left 2011    LAPAROSCOPIC CHOLECYSTECTOMY      SHOULDER SURGERY Bilateral     X 2 Dr. Houston    THYROIDECTOMY, PARTIAL      TOTAL HIP ARTHROPLASTY Left " 2016    Procedure: TOTAL HIP ARTHROPLASTY;  Surgeon: Sanchez Goel MD;  Location:  [facility];  Service:     TOTAL HIP ARTHROPLASTY Right 2016    Procedure: TOTAL HIP ARTHROPLASTY depuy [place] 7fr hemovac drain placement;  Surgeon: Sanchez Goel MD;  Location:  [facility];  Service:     WRIST SURGERY Left        Family History   Problem Relation Age of Onset    Lung disease Father     COPD Father     Cancer Sister     Cancer Paternal Aunt     Diabetes Maternal Grandmother     Diabetes Maternal Grandfather     Diabetes Paternal Grandmother     Diabetes Paternal Grandfather        Social History     Socioeconomic History    Marital status:    Tobacco Use    Smoking status: Former     Packs/day: 1.00     Years: 7.00     Pack years: 7.00     Types: Cigarettes     Quit date:      Years since quittin.6    Smokeless tobacco: Never   Vaping Use    Vaping Use: Never used   Substance and Sexual Activity    Alcohol use: No    Drug use: Never    Sexual activity: Yes     Partners: Female           Objective   Physical Exam  Constitutional:       Appearance: Normal appearance. He is not ill-appearing.   HENT:      Head: Normocephalic and atraumatic.      Mouth/Throat:      Mouth: Mucous membranes are moist.      Pharynx: Oropharynx is clear.   Eyes:      Conjunctiva/sclera: Conjunctivae normal.   Cardiovascular:      Rate and Rhythm: Normal rate and regular rhythm.   Pulmonary:      Effort: Pulmonary effort is normal.      Breath sounds: Normal breath sounds. No wheezing or rales.   Chest:      Chest wall: Tenderness (mild right side chest wall) present.   Abdominal:      General: Abdomen is flat.      Palpations: Abdomen is soft.   Musculoskeletal:      Cervical back: Neck supple.   Skin:     General: Skin is warm and dry.   Neurological:      Mental Status: He is alert.   Psychiatric:         Mood and Affect: Mood normal.       Procedures           ED Course  ED Course as of 23 2307   Sat Aug 26,  2023 2213 Patient presents for chest pain and shortness of breath and cough x3 days.  Physical exam unremarkable, normal cardiovascular and lung exam.  Will obtain CBC, CMP, procalcitonin, troponin, COVID/flu swab as well as EKG and chest x-ray. [JF]   2306 Labwork unremarkable, EKG normal, chest x-ray without evidence of pneumonia, no acute pulmonary process.  Discussed with patient and patient's wife at bedside results of labs and imaging and plan for discharge with PCP follow-up and to take Tylenol and ibuprofen as needed for chest wall pain.  Patient and patient's wife understand agree with plan of care.  All questions answered prior to discharge. [JF]      ED Course User Index  [JF] Rj Bradford MD                                           Medical Decision Making  My differential diagnosis for dyspnea includes but is not limited to:  Asthma, COPD, pneumonia, pulmonary embolus, acute respiratory distress syndrome, pneumothorax, pleural effusion, pulmonary fibrosis, congestive heart failure, myocardial infarction, DKA, uremia, acidosis, sepsis, anemia, drug related, hyperventilation, CNS disease      Amount and/or Complexity of Data Reviewed  Labs: ordered.     Details: Labwork unremarkable, no leukocytosis, troponin normal, procalcitonin negative.  Radiology: ordered.     Details: No acute pulmonary process based on my interpretation.  ECG/medicine tests: ordered.     Details: 8/26/2023 at 2226  Rhythm sinus, rate 65  Normal axis, normal intervals, no ST changes  EKG interpreted by me contemporaneously by me with care          Final diagnoses:   Bronchitis       ED Disposition  ED Disposition       ED Disposition   Discharge    Condition   Stable    Comment   --               Jhon Morrell MD  [address] [place] [id number] [phone]    Schedule an appointment as soon as possible for a visit in 2 days  For re-evaluation         Medication List      No changes were made to your prescriptions  during this visit.            Rj Bradford MD  08/26/23 9407

## 2023-11-13 ENCOUNTER — OFFICE VISIT (OUTPATIENT)
Dept: ORTHOPEDIC SURGERY | Facility: CLINIC | Age: 53
End: 2023-11-13
Payer: MEDICARE

## 2023-11-13 VITALS — WEIGHT: 286 LBS | BODY MASS INDEX: 38.74 KG/M2 | HEIGHT: 72 IN

## 2023-11-13 DIAGNOSIS — M17.11 PRIMARY OSTEOARTHRITIS OF RIGHT KNEE: Primary | ICD-10-CM

## 2023-11-13 DIAGNOSIS — M94.261 CHONDROMALACIA OF RIGHT KNEE: ICD-10-CM

## 2023-11-13 DIAGNOSIS — S83.521A RUPTURE OF POSTERIOR CRUCIATE LIGAMENT OF RIGHT KNEE, INITIAL ENCOUNTER: ICD-10-CM

## 2023-11-13 DIAGNOSIS — M94.262 CHONDROMALACIA OF KNEE, LEFT: ICD-10-CM

## 2023-11-13 DIAGNOSIS — S83.411A TEAR OF MEDIAL COLLATERAL LIGAMENT OF RIGHT KNEE, INITIAL ENCOUNTER: ICD-10-CM

## 2023-11-13 RX ORDER — DULAGLUTIDE 1.5 MG/.5ML
INJECTION, SOLUTION SUBCUTANEOUS
COMMUNITY
Start: 2023-10-25

## 2023-11-13 RX ORDER — PREGABALIN 75 MG/1
1 CAPSULE ORAL EVERY 12 HOURS SCHEDULED
COMMUNITY
Start: 2023-10-12

## 2023-11-13 RX ADMIN — TRIAMCINOLONE ACETONIDE 80 MG: 40 INJECTION, SUSPENSION INTRA-ARTICULAR; INTRAMUSCULAR at 08:24

## 2023-11-13 RX ADMIN — LIDOCAINE HYDROCHLORIDE 8 ML: 10 INJECTION, SOLUTION EPIDURAL; INFILTRATION; INTRACAUDAL; PERINEURAL at 08:24

## 2023-11-13 NOTE — PROGRESS NOTES
Subjective:     Patient ID: Luke Irizarry II is a 53 y.o. male.    Chief Complaint:  Follow-up bilateral knee pain   Right knee PCL tear, MCL tear, DJD   Left knee chondromalacia  Last injection-bilateral knee-2022-cortisone    History of Present Illness  Luke Irizarry II returns to clinic today for evaluation of bilateral knee pain. His right knee has continued to bother him more than his left. They have been more symptomatic for him over the last 3 to 4 months. We had discussed proceeding with right total knee arthroplasty last visit back in 2023, but he decided against that. Since then, he has been continuing to try to manage with his symptoms in regards to pain, limitation, and function due to the knees. He rates the pain as moderate in intensity, aching in nature, localized primarily to the medial aspect of both knees, right more intense than the left. He denies any radiation of pain, associated numbness and paresthesias. He denies any fevers, chills, or sweats.     Social History     Occupational History    Not on file   Tobacco Use    Smoking status: Former     Packs/day: 1.00     Years: 7.00     Additional pack years: 0.00     Total pack years: 7.00     Types: Cigarettes     Quit date:      Years since quittin.8    Smokeless tobacco: Never   Vaping Use    Vaping Use: Never used   Substance and Sexual Activity    Alcohol use: No    Drug use: Never    Sexual activity: Yes     Partners: Female      Past Medical History:   Diagnosis Date    Arthritis     RIGHT HAND    Diabetes mellitus     GERD (gastroesophageal reflux disease)     H/O wisdom tooth extraction     Hearing deficit     Hypertension     Knee sprain     Knee swelling     Migraines     Right hip pain     sched BRAD    Sleep apnea     no machine, states needs to have new study    Spinal headache     AFTER SPINAL TAP REQUIRED BLOOD PATCH     Past Surgical History:   Procedure Laterality Date    APPENDECTOMY      BACK SURGERY      X 2     "BACK SURGERY  12/07/2020    CATARACT EXTRACTION      RIGHT EYE CATARACT REMOVED    JOINT REPLACEMENT      KNEE SURGERY Left 2011    LAPAROSCOPIC CHOLECYSTECTOMY      SHOULDER SURGERY Bilateral     X 2 Dr. Houston    THYROIDECTOMY, PARTIAL      TOTAL HIP ARTHROPLASTY Left 6/21/2016    Procedure: TOTAL HIP ARTHROPLASTY;  Surgeon: Sanchez Goel MD;  Location:  [facility];  Service:     TOTAL HIP ARTHROPLASTY Right 8/16/2016    Procedure: TOTAL HIP ARTHROPLASTY depuy summit 7fr hemovac drain placement;  Surgeon: Sanchez Goel MD;  Location:  [facility];  Service:     WRIST SURGERY Left        Family History   Problem Relation Age of Onset    Lung disease Father     COPD Father     Cancer Sister     Cancer Paternal Aunt     Diabetes Maternal Grandmother     Diabetes Maternal Grandfather     Diabetes Paternal Grandmother     Diabetes Paternal Grandfather          Review of Systems        Objective:  Vitals:    11/13/23 0811   Weight: 130 kg (286 lb)   Height: 182.9 cm (72\")         11/13/23 0811   Weight: 130 kg (286 lb)     Body mass index is 38.79 kg/m².  Physical Exam    Vital signs reviewed.   General: No acute distress, alert and oriented  Eyes: conjunctiva clear; pupils equally round and reactive  ENT: external ears and nose atraumatic; oropharynx clear  CV: no peripheral edema  Resp: normal respiratory effort  Skin: no rashes or wounds; normal turgor  Psych: mood and affect appropriate; recent and remote memory intact        Ortho Exam       Right Knee-     ROM 3 to 120 degrees  4+/5 on flexion  4+/5 on extension  Maximal tenderness to palpation medial joint line as well as medial and lateral patellar facet  Effusion- Moderate  Posterior drawer- Grade 2 with mild to moderate endpoint  Grade 2 opening on valgus stress at 30 degrees  Grade 1 opening on valgus stress at 0  Active patellar compression test- Positive  Positive sensation light touch all distributions symmetric to contralateral side  Brisk cap refill all " digits  1+ dorsalis pedis pulse    Imaging:  Bilateral Knee X-Ray  Indication: Pain    AP, Lateral, and Anselmo views    Findings:  No fracture  No bony lesion  Normal soft tissues  Right knee with advanced medial compartment joint space narrowing, mild medial and patellofemoral compartment joint space narrowing of the left knee.  Slight varus alignment on the right.  Mild evidence of reactive osteophyte formation of right knee.    Compared to prior office x-rays    Assessment:        1. Primary osteoarthritis of right knee    2. Tear of medial collateral ligament of right knee, initial encounter    3. Rupture of posterior cruciate ligament of right knee, initial encounter    4. Chondromalacia of knee, left    5. Chondromalacia of right knee           Plan:      - Large Joint Arthrocentesis: bilateral knee on 11/13/2023 8:24 AM  Indications: pain  Details: 22 G needle, anterolateral approach  Medications (Right): 8 mL lidocaine PF 1% 1 %; 80 mg triamcinolone acetonide 40 MG/ML  Medications (Left): 8 mL lidocaine PF 1% 1 %; 80 mg triamcinolone acetonide 40 MG/ML  Outcome: tolerated well, no immediate complications  Procedure, treatment alternatives, risks and benefits explained, specific risks discussed. Consent was given by the patient. Immediately prior to procedure a time out was called to verify the correct patient, procedure, equipment, support staff and site/side marked as required. Patient was prepped and draped in the usual sterile fashion.           1. I discussed treatment options at length with patient at today's visit.    2. At this time, he is noticing some moderate to significant recurrence of pain and given this, he would like to proceed with bilateral knee injection today. We did discuss option for total knee arthroplasty again, but he wants to hold off on that at this time.    3. Patient would like to proceed with cortisone injection today to the bilateral knees. Recommended limited use of affected  extremity for the next 24 hours to only essential activites other than work on general active and passive motion. Recommended supplementing with ice and soft tissue massage. Discussed with patient that they should see results in 5-7 days, if no improvement in 5-6 weeks I have asked them to call the office to review other options. Patient should call office immediately if they notice redness, warmth, fevers, chills, or residual numbness or tingling for greater than 6 hours after injection.     4. He is to continue with hip, core, and quad strengthening exercises at home.    5. He will follow up in 3 months or sooner if needed.    Luke PATEL Juan C BILLINGS was in agreement with plan and had all questions answered.     Orders:  Orders Placed This Encounter   Procedures    - Large Joint Arthrocentesis: bilateral knee    XR Knee 3 View Bilateral       Medications:  No orders of the defined types were placed in this encounter.      Followup:  No follow-ups on file.    Diagnoses and all orders for this visit:    1. Primary osteoarthritis of right knee (Primary)  -     Cancel: XR Knee 4+ View Bilateral  -     - Large Joint Arthrocentesis: bilateral knee    2. Tear of medial collateral ligament of right knee, initial encounter  -     Cancel: XR Knee 4+ View Bilateral    3. Rupture of posterior cruciate ligament of right knee, initial encounter  -     Cancel: XR Knee 4+ View Bilateral    4. Chondromalacia of knee, left  -     Cancel: XR Knee 4+ View Bilateral  -     - Large Joint Arthrocentesis: bilateral knee  -     XR Knee 3 View Bilateral    5. Chondromalacia of right knee  -     Cancel: XR Knee 4+ View Bilateral  -     XR Knee 3 View Bilateral    Other orders  -     Cancel: Large Joint Arthrocentesis          Dictated utilizing Dragon dictation       Transcribed from ambient dictation for Binh Tavarez MD by Monie Higuera.  11/13/23   09:03 EST    Patient or patient representative verbalized consent to the visit recording.  I  have personally performed the services described in this document as transcribed by the above individual, and it is both accurate and complete.

## 2023-11-16 RX ORDER — LIDOCAINE HYDROCHLORIDE 10 MG/ML
8 INJECTION, SOLUTION EPIDURAL; INFILTRATION; INTRACAUDAL; PERINEURAL
Status: COMPLETED | OUTPATIENT
Start: 2023-11-13 | End: 2023-11-13

## 2023-11-16 RX ORDER — TRIAMCINOLONE ACETONIDE 40 MG/ML
80 INJECTION, SUSPENSION INTRA-ARTICULAR; INTRAMUSCULAR
Status: COMPLETED | OUTPATIENT
Start: 2023-11-13 | End: 2023-11-13

## 2024-01-04 ENCOUNTER — OFFICE VISIT (OUTPATIENT)
Dept: ORTHOPEDIC SURGERY | Facility: CLINIC | Age: 54
End: 2024-01-04
Payer: MEDICARE

## 2024-01-04 VITALS — WEIGHT: 286 LBS | HEIGHT: 72 IN | BODY MASS INDEX: 38.74 KG/M2

## 2024-01-04 DIAGNOSIS — M70.61 TROCHANTERIC BURSITIS OF RIGHT HIP: ICD-10-CM

## 2024-01-04 DIAGNOSIS — Z96.643 STATUS POST TOTAL REPLACEMENT OF BOTH HIPS: Primary | ICD-10-CM

## 2024-01-04 RX ORDER — TRIAMCINOLONE ACETONIDE 40 MG/ML
80 INJECTION, SUSPENSION INTRA-ARTICULAR; INTRAMUSCULAR
Status: COMPLETED | OUTPATIENT
Start: 2024-01-04 | End: 2024-01-04

## 2024-01-04 RX ORDER — CYCLOBENZAPRINE HCL 10 MG
10 TABLET ORAL
COMMUNITY

## 2024-01-04 RX ORDER — LIDOCAINE HYDROCHLORIDE 10 MG/ML
4 INJECTION, SOLUTION EPIDURAL; INFILTRATION; INTRACAUDAL; PERINEURAL
Status: COMPLETED | OUTPATIENT
Start: 2024-01-04 | End: 2024-01-04

## 2024-01-04 RX ORDER — SEMAGLUTIDE 0.68 MG/ML
INJECTION, SOLUTION SUBCUTANEOUS
COMMUNITY
Start: 2023-12-29

## 2024-01-04 RX ADMIN — TRIAMCINOLONE ACETONIDE 80 MG: 40 INJECTION, SUSPENSION INTRA-ARTICULAR; INTRAMUSCULAR at 09:27

## 2024-01-04 RX ADMIN — LIDOCAINE HYDROCHLORIDE 4 ML: 10 INJECTION, SOLUTION EPIDURAL; INFILTRATION; INTRACAUDAL; PERINEURAL at 09:27

## 2024-01-04 NOTE — PROGRESS NOTES
Subjective: Right hip pain     Patient ID: Luke Irizarry II is a 53 y.o. male.    Chief Complaint:    History of Present Illness 53-year-old male known to me status post right total hip arthroplasty in  presents with a new complaint of right hip pain for the past week to 10 days.  No history of trauma but is noted primarily lateral hip pain particularly lies on that side at nighttime.  Also having some groin pain but primarily the pain is laterally.  He did have back surgery last February but is not having any radicular pain.  He is taking an anti-inflammatory.       Social History     Occupational History    Not on file   Tobacco Use    Smoking status: Former     Packs/day: 1.00     Years: 7.00     Additional pack years: 0.00     Total pack years: 7.00     Types: Cigarettes     Quit date:      Years since quittin.0    Smokeless tobacco: Never   Vaping Use    Vaping Use: Never used   Substance and Sexual Activity    Alcohol use: No    Drug use: Never    Sexual activity: Yes     Partners: Female      Review of Systems      Past Medical History:   Diagnosis Date    Arthritis     RIGHT HAND    Diabetes mellitus     GERD (gastroesophageal reflux disease)     H/O wisdom tooth extraction     Hearing deficit     Hypertension     Knee sprain     Knee swelling     Migraines     Right hip pain     sched BRAD    Sleep apnea     no machine, states needs to have new study    Spinal headache     AFTER SPINAL TAP REQUIRED BLOOD PATCH     Past Surgical History:   Procedure Laterality Date    APPENDECTOMY      BACK SURGERY      X 2    BACK SURGERY  2020    CATARACT EXTRACTION      RIGHT EYE CATARACT REMOVED    JOINT REPLACEMENT      KNEE SURGERY Left     LAPAROSCOPIC CHOLECYSTECTOMY      SHOULDER SURGERY Bilateral     X 2 Dr. Houston    THYROIDECTOMY, PARTIAL      TOTAL HIP ARTHROPLASTY Left 2016    Procedure: TOTAL HIP ARTHROPLASTY;  Surgeon: Sanchze Goel MD;  Location: [facility];  Service:     TOTAL HIP  ARTHROPLASTY Right 8/16/2016    Procedure: TOTAL HIP ARTHROPLASTY depuy summit 7fr hemovac drain placement;  Surgeon: Sanchez Goel MD;  Location: [facility];  Service:     WRIST SURGERY Left      Family History   Problem Relation Age of Onset    Lung disease Father     COPD Father     Cancer Sister     Cancer Paternal Aunt     Diabetes Maternal Grandmother     Diabetes Maternal Grandfather     Diabetes Paternal Grandmother     Diabetes Paternal Grandfather          Objective:  There were no vitals filed for this visit.      01/04/24  0854   Weight: 130 kg (286 lb)     Body mass index is 38.79 kg/m².        Ortho Exam   AP and lateral of the right hip shows excellent position of the implants with no changes compared to x-rays done of several years ago.  Alignment remains near anatomical limb length is equal compared to the left total hip no evidence of loosening or subsidence and cup position remains in good position.  He is alert and oriented x 3.  He has no motor or sensory deficit in the right lower extremity.  He does have some pain in the groin area with internal rotation past 30 degrees but has a negative Stinchfield test and negative straight leg raise.  He does have some pain to palpation over the right greater trochanter and has a positive Carey's test and pain with abduction against resistance.  Skin is cool to touch no ecchymosis or bruising.  He is tolerating the anti-inflammatory medication.    Assessment:        1. Status post total replacement of both hips    2. Trochanteric bursitis of right hip           Plan: Reviewed with the patient his x-rays history and physical exam.  I see no evidence on x-ray of any loosening of the implants or change in the position which would account for the groin pain unless he is developing some tendinitis of the piriformis muscle.  With regard to the trochanteric pain after reviewing treatment options regardlessly with a cortisone injection of 4 cc of lidocaine and 2 cc  of Kenalog.  Postinjection instructions regarding his blood glucose level given to the patient.  He is to return to see me in a month and wanted to ensure that both the trochanteric and the groin pain has resolved.  Patient was in agreement.  Answered all questions      Large Joint Arthrocentesis: R greater trochanteric bursa  Date/Time: 1/4/2024 9:27 AM  Consent given by: patient  Site marked: site marked  Timeout: Immediately prior to procedure a time out was called to verify the correct patient, procedure, equipment, support staff and site/side marked as required   Supporting Documentation  Indications: pain   Procedure Details  Location: hip - R greater trochanteric bursa  Preparation: Patient was prepped and draped in the usual sterile fashion  Needle size: 22 G  Approach: lateral  Medications administered: 4 mL lidocaine PF 1% 1 %; 80 mg triamcinolone acetonide 40 MG/ML  Patient tolerance: patient tolerated the procedure well with no immediate complications            Work Status:    BEN query complete.    Orders:  Orders Placed This Encounter   Procedures    Large Joint Arthrocentesis: R greater trochanteric bursa    XR Hip With or Without Pelvis 2 - 3 View Right       Medications:  No orders of the defined types were placed in this encounter.      Followup:  Return in about 4 weeks (around 2/1/2024).          Dictated utilizing Dragon dictation

## 2024-02-01 ENCOUNTER — OFFICE VISIT (OUTPATIENT)
Dept: ORTHOPEDIC SURGERY | Facility: CLINIC | Age: 54
End: 2024-02-01
Payer: MEDICARE

## 2024-02-01 VITALS — WEIGHT: 286 LBS | HEIGHT: 72 IN | BODY MASS INDEX: 38.74 KG/M2

## 2024-02-01 DIAGNOSIS — Z96.643 STATUS POST TOTAL REPLACEMENT OF BOTH HIPS: Primary | ICD-10-CM

## 2024-02-01 DIAGNOSIS — M70.61 TROCHANTERIC BURSITIS OF RIGHT HIP: ICD-10-CM

## 2024-02-01 NOTE — PROGRESS NOTES
Subjective: Status post right total hip, right lateral hip pain     Patient ID: Luke Irizarry II is a 54 y.o. male.    Chief Complaint:    History of Present Illness patient returns after having undergone a cortisone injection for trochanteric bursitis and reports eliminated pain for several days maybe a week at the most of the pain is returned.  Chronic right lateral hip pain and even having some mild groin pain.  He is having pain on a daily basis.       Social History     Occupational History    Not on file   Tobacco Use    Smoking status: Former     Packs/day: 1.00     Years: 7.00     Additional pack years: 0.00     Total pack years: 7.00     Types: Cigarettes     Quit date:      Years since quittin.0    Smokeless tobacco: Never   Vaping Use    Vaping Use: Never used   Substance and Sexual Activity    Alcohol use: No    Drug use: Never    Sexual activity: Yes     Partners: Female      Review of Systems      Past Medical History:   Diagnosis Date    Arthritis     RIGHT HAND    Diabetes mellitus     GERD (gastroesophageal reflux disease)     H/O wisdom tooth extraction     Hearing deficit     Hypertension     Knee sprain     Knee swelling     Migraines     Right hip pain     sched BRAD    Sleep apnea     no machine, states needs to have new study    Spinal headache     AFTER SPINAL TAP REQUIRED BLOOD PATCH     Past Surgical History:   Procedure Laterality Date    APPENDECTOMY      BACK SURGERY      X 2    BACK SURGERY  2020    CATARACT EXTRACTION      RIGHT EYE CATARACT REMOVED    JOINT REPLACEMENT      KNEE SURGERY Left     LAPAROSCOPIC CHOLECYSTECTOMY      SHOULDER SURGERY Bilateral     X 2 Dr. Houston    THYROIDECTOMY, PARTIAL      TOTAL HIP ARTHROPLASTY Left 2016    Procedure: TOTAL HIP ARTHROPLASTY;  Surgeon: Sanchez Goel MD;  Location: [facility];  Service:     TOTAL HIP ARTHROPLASTY Right 2016    Procedure: TOTAL HIP ARTHROPLASTY depuy summit 7fr hemovac drain placement;  Surgeon:  Sanchez Goel MD;  Location: [facility] OR;  Service:     WRIST SURGERY Left      Family History   Problem Relation Age of Onset    Lung disease Father     COPD Father     Cancer Sister     Cancer Paternal Aunt     Diabetes Maternal Grandmother     Diabetes Maternal Grandfather     Diabetes Paternal Grandmother     Diabetes Paternal Grandfather          Objective:  There were no vitals filed for this visit.      02/01/24  0957   Weight: 130 kg (286 lb)     Body mass index is 38.79 kg/m².        Ortho Exam   reviewed previous x-ray shows again no change in position of the implant or any evidence of loosening previous testing does not show evidence of a loose total hip arthroplasty.  He is alert and oriented x 3.  He has persistent pain to palpation over the right greater trochanteric with pain on abduction against resistance with a positive Carey's test.  Again he has a negative straight leg raise and a negative Stinchfield test.  There is some mild pain with internal rotation.  Calf nontender good distal pulses.    Assessment:        1. Status post total replacement of both hips    2. Trochanteric bursitis of right hip           Plan: An MRI done almost 3 years ago does show some fluid collection over the greater trochanter and evidence of may have some chronic trochanteric bursitis but no evidence of malposition or loosening plan.  I want to repeat that MRI to see if the gluteal tendinitis has progressed to tears.  Patient was agreement.  Return after the MRIs been completed.  Answered all questions            Work Status:    BEN query complete.    Orders:  Orders Placed This Encounter   Procedures    MRI Hip Right Without Contrast       Medications:  No orders of the defined types were placed in this encounter.      Followup:  Return in about 4 weeks (around 2/29/2024).          Dictated utilizing Dragon dictation

## 2024-02-20 ENCOUNTER — PATIENT ROUNDING (BHMG ONLY) (OUTPATIENT)
Dept: URGENT CARE | Facility: CLINIC | Age: 54
End: 2024-02-20
Payer: MEDICARE

## 2024-02-20 NOTE — ED NOTES
Thank you for letting us care for you in your recent visit to our urgent care center. We would love to hear about your experience with us. Was this the first time you have visited our location?    We’re always looking for ways to make our patients’ experiences even better. Do you have any recommendations on ways we may improve?     I appreciate you taking the time to respond. Please be on the lookout for a survey about your recent visit from [employer] via text or email. We would greatly appreciate if you could fill that out and turn it back in. We want your voice to be heard and we value your feedback.   Thank you for choosing [facility] for your healthcare needs.

## 2024-02-21 ENCOUNTER — HOSPITAL ENCOUNTER (OUTPATIENT)
Dept: MRI IMAGING | Facility: HOSPITAL | Age: 54
Discharge: HOME | End: 2024-02-21
Payer: MEDICARE

## 2024-02-27 ENCOUNTER — HOSPITAL ENCOUNTER (OUTPATIENT)
Dept: MRI IMAGING | Facility: HOSPITAL | Age: 54
Discharge: HOME | End: 2024-02-27
Admitting: ORTHOPAEDIC SURGERY
Payer: MEDICARE

## 2024-02-27 DIAGNOSIS — M70.61 TROCHANTERIC BURSITIS OF RIGHT HIP: ICD-10-CM

## 2024-02-27 DIAGNOSIS — Z96.643 STATUS POST TOTAL REPLACEMENT OF BOTH HIPS: ICD-10-CM

## 2024-02-27 PROCEDURE — 73721 MRI JNT OF LWR EXTRE W/O DYE: CPT

## 2024-03-05 ENCOUNTER — OFFICE VISIT (OUTPATIENT)
Dept: ORTHOPEDIC SURGERY | Facility: CLINIC | Age: 54
End: 2024-03-05
Payer: MEDICARE

## 2024-03-05 VITALS — WEIGHT: 276 LBS | HEIGHT: 72 IN | BODY MASS INDEX: 37.38 KG/M2

## 2024-03-05 DIAGNOSIS — Z96.643 STATUS POST TOTAL REPLACEMENT OF BOTH HIPS: Primary | ICD-10-CM

## 2024-03-05 DIAGNOSIS — M62.50 MUSCULAR ATROPHY, UNSPECIFIED SITE: ICD-10-CM

## 2024-03-05 DIAGNOSIS — M70.61 TROCHANTERIC BURSITIS OF RIGHT HIP: ICD-10-CM

## 2024-03-05 NOTE — PROGRESS NOTES
Subjective: Status post total hip arthroplasty, bilateral hip pain     Patient ID: Luke Irizarry II is a 54 y.o. male.    Chief Complaint:    History of Present Illness patient returns to review the results of the MRI of his hips and I personally reviewed the images and report which shows bilateral severe muscle atrophy of the gluteal musculature and gluteal tendons.  No evidence of tendon tears or ruptures.  Hip implants are in good position.       Social History     Occupational History    Not on file   Tobacco Use    Smoking status: Former     Current packs/day: 0.00     Average packs/day: 1 pack/day for 7.0 years (7.0 ttl pk-yrs)     Types: Cigarettes     Start date:      Quit date:      Years since quittin.1    Smokeless tobacco: Never   Vaping Use    Vaping status: Never Used   Substance and Sexual Activity    Alcohol use: No    Drug use: Never    Sexual activity: Yes     Partners: Female      Review of Systems      Past Medical History:   Diagnosis Date    Arthritis     RIGHT HAND    Diabetes mellitus     GERD (gastroesophageal reflux disease)     H/O wisdom tooth extraction     Hearing deficit     Hypertension     Knee sprain     Knee swelling     Migraines     Right hip pain     sched BRAD    Sleep apnea     no machine, states needs to have new study    Spinal headache     AFTER SPINAL TAP REQUIRED BLOOD PATCH     Past Surgical History:   Procedure Laterality Date    APPENDECTOMY      BACK SURGERY      X 2    BACK SURGERY  2020    CATARACT EXTRACTION      RIGHT EYE CATARACT REMOVED    JOINT REPLACEMENT      KNEE SURGERY Left     LAPAROSCOPIC CHOLECYSTECTOMY      SHOULDER SURGERY Bilateral     X 2 Dr. Houston    THYROIDECTOMY, PARTIAL      TOTAL HIP ARTHROPLASTY Left 2016    Procedure: TOTAL HIP ARTHROPLASTY;  Surgeon: Sanchez Gole MD;  Location: [facility];  Service:     TOTAL HIP ARTHROPLASTY Right 2016    Procedure: TOTAL HIP ARTHROPLASTY depuy summit 7fr hemovac drain  placement;  Surgeon: Sanchez Goel MD;  Location: [facility];  Service:     WRIST SURGERY Left      Family History   Problem Relation Age of Onset    Lung disease Father     COPD Father     Cancer Sister     Cancer Paternal Aunt     Diabetes Maternal Grandmother     Diabetes Maternal Grandfather     Diabetes Paternal Grandmother     Diabetes Paternal Grandfather          Objective:  There were no vitals filed for this visit.      03/05/24  0910   Weight: 125 kg (276 lb)     Body mass index is 37.43 kg/m².        Ortho Exam   patient is alert and oriented x 3.  I reviewed the results of the MRI images and the report with the patient.  Again it shows gluteal atrophy of the muscles and the tendons with some fluid collection but no surgical lesions noted.  He is having persistent pain in both hips.  There is no motor or sensory deficit.  Also has some chronic low back problems.    Assessment:    MRI Hip Right Without Contrast    Result Date: 2/27/2024  Impression: 1.Evidence for severe atrophy associated with the gluteal muscular soft tissues and tendons bilaterally. There is no evidence for musculotendinous avulsion or retraction. 2.Otherwise the additional musculotendinous attachments associated with the pelvis and hips bilaterally are grossly intact without evidence for avulsion or retraction. Additional changes of tendinopathy are noted. 3.Evidence for chronic fluid signal within the soft tissues adjacent to the greater trochanters bilaterally, more pronounced on the left. These findings appear to be similar when compared to prior and suggest chronic postoperative fluid collections. 4.No evidence for significant hemorrhage or hematoma throughout the soft tissues. 5.No definitive evidence for fracture or stress related changes throughout the osseous pelvis or proximal femurs. Electronically Signed: Jeff Severino MD  2/27/2024 3:36 PM EST  Workstation ID: [id number]        1. Status post total replacement of both hips     2. Trochanteric bursitis of right hip    3. Muscular atrophy, unspecified site           Plan: Again reviewed the images and the report with the patient.  Again it shows a no evidence of muscle or tendon rupture or retraction.  Will get involved in intensive physical therapy program.  Patient return to see me in 3 months.  I did tell her probably take 6 months or longer to notice the full beneficial effect of the therapy.  But I assured him at this point is not a surgical lesion.  Patient was in agreement.  Answered all questions.            Work Status:    BEN query complete.    Orders:  Orders Placed This Encounter   Procedures    Ambulatory Referral to Physical Therapy Evaluate and treat       Medications:  No orders of the defined types were placed in this encounter.      Followup:  Return in about 3 months (around 6/5/2024).          Dictated utilizing Dragon dictation

## 2024-03-13 ENCOUNTER — OFFICE VISIT (OUTPATIENT)
Dept: ORTHOPEDIC SURGERY | Facility: CLINIC | Age: 54
End: 2024-03-13
Payer: MEDICARE

## 2024-03-13 VITALS
HEIGHT: 72 IN | BODY MASS INDEX: 37.93 KG/M2 | SYSTOLIC BLOOD PRESSURE: 124 MMHG | DIASTOLIC BLOOD PRESSURE: 78 MMHG | HEART RATE: 76 BPM | WEIGHT: 280 LBS

## 2024-03-13 DIAGNOSIS — M25.512 ACUTE PAIN OF LEFT SHOULDER: Primary | ICD-10-CM

## 2024-03-13 DIAGNOSIS — M75.102 NONTRAUMATIC TEAR OF LEFT ROTATOR CUFF, UNSPECIFIED TEAR EXTENT: ICD-10-CM

## 2024-03-13 DIAGNOSIS — Z98.890 STATUS POST LEFT ROTATOR CUFF REPAIR: ICD-10-CM

## 2024-03-13 PROCEDURE — 99214 OFFICE O/P EST MOD 30 MIN: CPT | Performed by: ORTHOPAEDIC SURGERY

## 2024-03-13 NOTE — PROGRESS NOTES
Subjective: Left shoulder pain     Patient ID: Luke Irizarry II is a 54 y.o. male.    Chief Complaint:    History of Present Illness 54-year-old male WB status reviewed problem left shoulder.  States about 6 weeks ago began having significant pain in his shoulder that he describes as severe 8 out of 10.  Describes a shooting grinding achy discomfort.  States that time he cannot lift his shoulder much above 20 to 30 degrees.  Past medical history significant that over 20 years ago he had rotator cuff repair he believes by Dr. Carlos Eduardo Camarillo and did well until just recently.  No history of trauma.       Social History     Occupational History    Not on file   Tobacco Use    Smoking status: Former     Current packs/day: 0.00     Average packs/day: 1 pack/day for 7.0 years (7.0 ttl pk-yrs)     Types: Cigarettes     Start date:      Quit date:      Years since quittin.2     Passive exposure: Past    Smokeless tobacco: Never   Vaping Use    Vaping status: Never Used   Substance and Sexual Activity    Alcohol use: No    Drug use: Never    Sexual activity: Yes     Partners: Female      Review of Systems   Constitutional:  Negative for chills, diaphoresis, fever and unexpected weight change.   HENT:  Negative for hearing loss, nosebleeds, sore throat and tinnitus.    Eyes:  Negative for pain and visual disturbance.   Respiratory:  Negative for cough, shortness of breath and wheezing.    Cardiovascular:  Negative for chest pain and palpitations.   Gastrointestinal:  Negative for abdominal pain, diarrhea, nausea and vomiting.   Endocrine: Negative for cold intolerance, heat intolerance and polydipsia.   Genitourinary:  Negative for difficulty urinating, dysuria and hematuria.   Musculoskeletal:  Positive for arthralgias and myalgias. Negative for joint swelling.   Skin:  Negative for rash and wound.   Allergic/Immunologic: Negative for environmental allergies.   Neurological:  Negative for dizziness, syncope and  numbness.   Hematological:  Does not bruise/bleed easily.   Psychiatric/Behavioral:  Negative for dysphoric mood and sleep disturbance. The patient is not nervous/anxious.          Past Medical History:   Diagnosis Date    Arthritis     RIGHT HAND    Diabetes mellitus     GERD (gastroesophageal reflux disease)     H/O wisdom tooth extraction     Hearing deficit     Hypertension     Knee sprain     Knee swelling     Migraines     Right hip pain     sched BRAD    Sleep apnea     no machine, states needs to have new study    Spinal headache     AFTER SPINAL TAP REQUIRED BLOOD PATCH     Past Surgical History:   Procedure Laterality Date    APPENDECTOMY      BACK SURGERY      X 2    BACK SURGERY  12/07/2020    CATARACT EXTRACTION      RIGHT EYE CATARACT REMOVED    JOINT REPLACEMENT      KNEE SURGERY Left 2011    LAPAROSCOPIC CHOLECYSTECTOMY      SHOULDER SURGERY Bilateral     X 2 Dr. Houston    THYROIDECTOMY, PARTIAL      TOTAL HIP ARTHROPLASTY Left 6/21/2016    Procedure: TOTAL HIP ARTHROPLASTY;  Surgeon: Sanchez Goel MD;  Location:  [facility];  Service:     TOTAL HIP ARTHROPLASTY Right 8/16/2016    Procedure: TOTAL HIP ARTHROPLASTY depuy summit 7fr hemovac drain placement;  Surgeon: Sanchez Goel MD;  Location:  [facility];  Service:     WRIST SURGERY Left      Family History   Problem Relation Age of Onset    Lung disease Father     COPD Father     Cancer Sister     Cancer Paternal Aunt     Diabetes Maternal Grandmother     Diabetes Maternal Grandfather     Diabetes Paternal Grandmother     Diabetes Paternal Grandfather          Objective:  Vitals:    03/13/24 0955   BP: 124/78   Pulse: 76         03/13/24  0955   Weight: 127 kg (280 lb)     Body mass index is 37.97 kg/m².        Ortho Exam   AP lateral outlet view of the shoulder demonstrates a tan-white loose body in the subacromial space.  He is alert and oriented x 3.  He has no motor or sensory deficit in the left upper extremities right radial, ulnar median nerve  function intact good capillary refill.  He has full range of motion of the elbow but he can extend and abduct only about 70 to 80 degrees and against resistance rotator cuff function is 3 out of 5.  Markedly positive crossarm test and Ramirez sign.  Moderate to severe pain with belly press test.  Positive empty can test.  External rotation is probably 30 to 35 degrees internal rotation is to L5.  Skin is cool to touch is no ecchymosis or bruising.    Assessment:        1. Acute pain of left shoulder    2. Nontraumatic tear of left rotator cuff, unspecified tear extent    3. Status post left rotator cuff repair           Plan: Reviewed the x-rays history and exam with the patient.  Also reviewed the x-rays with Dr. Tavarez.  He has a loose body in the subacromial space and 1 concerns degrees reach on the rotator cuff.  Will order an MRI to evaluate integrity of the rotator cuff and then he will follow-up with Dr. Tavarez to discuss surgery based on the results of the MRI of the shoulder.  Patient was in agreement.  Answered all questions            Work Status:    BEN query complete.    Orders:  Orders Placed This Encounter   Procedures    XR Shoulder 2+ View Left    MRI Shoulder Left Without Contrast       Medications:  No orders of the defined types were placed in this encounter.      Followup:  Return in about 3 weeks (around 4/3/2024).          Dictated utilizing Dragon dictation

## 2024-03-20 ENCOUNTER — HOSPITAL ENCOUNTER (OUTPATIENT)
Dept: MRI IMAGING | Facility: HOSPITAL | Age: 54
Discharge: HOME | End: 2024-03-20
Admitting: ORTHOPAEDIC SURGERY
Payer: MEDICARE

## 2024-03-20 DIAGNOSIS — Z98.890 STATUS POST LEFT ROTATOR CUFF REPAIR: ICD-10-CM

## 2024-03-20 DIAGNOSIS — M75.102 NONTRAUMATIC TEAR OF LEFT ROTATOR CUFF, UNSPECIFIED TEAR EXTENT: ICD-10-CM

## 2024-03-20 DIAGNOSIS — M25.512 ACUTE PAIN OF LEFT SHOULDER: ICD-10-CM

## 2024-03-20 PROCEDURE — 73221 MRI JOINT UPR EXTREM W/O DYE: CPT

## 2024-03-21 ENCOUNTER — OFFICE VISIT (OUTPATIENT)
Dept: ORTHOPEDIC SURGERY | Facility: CLINIC | Age: 54
End: 2024-03-21
Payer: MEDICARE

## 2024-03-21 VITALS
DIASTOLIC BLOOD PRESSURE: 76 MMHG | WEIGHT: 280 LBS | SYSTOLIC BLOOD PRESSURE: 120 MMHG | BODY MASS INDEX: 37.93 KG/M2 | HEIGHT: 72 IN

## 2024-03-21 DIAGNOSIS — M25.512 ACUTE PAIN OF LEFT SHOULDER: ICD-10-CM

## 2024-03-21 DIAGNOSIS — Z98.890 STATUS POST LEFT ROTATOR CUFF REPAIR: ICD-10-CM

## 2024-03-21 DIAGNOSIS — M24.012 LOOSE BODY IN JOINT OF LEFT SHOULDER REGION: ICD-10-CM

## 2024-03-21 DIAGNOSIS — M75.102 NONTRAUMATIC TEAR OF LEFT ROTATOR CUFF, UNSPECIFIED TEAR EXTENT: Primary | ICD-10-CM

## 2024-03-21 PROCEDURE — 3074F SYST BP LT 130 MM HG: CPT | Performed by: ORTHOPAEDIC SURGERY

## 2024-03-21 PROCEDURE — 99214 OFFICE O/P EST MOD 30 MIN: CPT | Performed by: ORTHOPAEDIC SURGERY

## 2024-03-21 PROCEDURE — 3078F DIAST BP <80 MM HG: CPT | Performed by: ORTHOPAEDIC SURGERY

## 2024-03-21 RX ORDER — PREGABALIN 75 MG/1
75 CAPSULE ORAL
COMMUNITY
Start: 2024-03-19 | End: 2024-04-18

## 2024-03-21 NOTE — PROGRESS NOTES
Subjective:     Patient ID: Luke Irizarry II is a 54 y.o. male.    Chief Complaint:  Left shoulder pain, new issue    History of Present Illness  Luke Irizarry II presents to clinic today for evaluation of left shoulder pain and catching sensations. He is accompanied by an adult female.    Left shoulder pain  This has been going on for approximately 6 to 8 weeks. He denies any specific injury prior to the onset of the symptoms. He does have a history of a previous rotator cuff repair and biceps tenodesis greater than 10 years ago by Dr. Carlos Eduardo Houston. He denies any numbness or tingling in the left upper extremity. He localizes pain to the lateral subacromial space. He does note that this is the same reason where he feels a catching type of sensation when it does happen. He denies any issues with his previous incisions. He is right hand dominant. He has had minimal improvement with soft tissue massage, activity modification, anti-inflammatory medications, and home exercises which he has been doing for greater than 6 weeks. He denies any numbness or tingling. He does get some mild radiation of pain down the lateral upper extremity, but not below the level of the elbow.    He is a type 2 diabetic. His last hemoglobin A1c was 6.7 percent. He denies any heart issues. The adult female asks how long surgery and recovery would be.      Social History     Occupational History    Not on file   Tobacco Use    Smoking status: Former     Current packs/day: 0.00     Average packs/day: 1 pack/day for 7.0 years (7.0 ttl pk-yrs)     Types: Cigarettes     Start date:      Quit date:      Years since quittin.2     Passive exposure: Past    Smokeless tobacco: Never   Vaping Use    Vaping status: Never Used   Substance and Sexual Activity    Alcohol use: No    Drug use: Never    Sexual activity: Yes     Partners: Female      Past Medical History:   Diagnosis Date    Arthritis     RIGHT HAND    Diabetes mellitus     GERD  "(gastroesophageal reflux disease)     H/O wisdom tooth extraction     Hearing deficit     Hypertension     Knee sprain     Knee swelling     Migraines     Right hip pain     sched BRAD    Sleep apnea     no machine, states needs to have new study    Spinal headache     AFTER SPINAL TAP REQUIRED BLOOD PATCH     Past Surgical History:   Procedure Laterality Date    APPENDECTOMY      BACK SURGERY      X 2    BACK SURGERY  12/07/2020    CATARACT EXTRACTION      RIGHT EYE CATARACT REMOVED    JOINT REPLACEMENT      KNEE SURGERY Left 2011    LAPAROSCOPIC CHOLECYSTECTOMY      SHOULDER SURGERY Bilateral     X 2 Dr. Houston    THYROIDECTOMY, PARTIAL      TOTAL HIP ARTHROPLASTY Left 6/21/2016    Procedure: TOTAL HIP ARTHROPLASTY;  Surgeon: Sanchez Goel MD;  Location:  [facility];  Service:     TOTAL HIP ARTHROPLASTY Right 8/16/2016    Procedure: TOTAL HIP ARTHROPLASTY depuy summit 7fr hemovac drain placement;  Surgeon: Sanchez Goel MD;  Location:  [facility];  Service:     WRIST SURGERY Left        Family History   Problem Relation Age of Onset    Lung disease Father     COPD Father     Cancer Sister     Cancer Paternal Aunt     Diabetes Maternal Grandmother     Diabetes Maternal Grandfather     Diabetes Paternal Grandmother     Diabetes Paternal Grandfather          Review of Systems        Objective:  Vitals:    03/21/24 1148   BP: 120/76   Weight: 127 kg (280 lb)   Height: 182.9 cm (72\")         03/21/24  1148   Weight: 127 kg (280 lb)     Body mass index is 37.97 kg/m².  Physical Exam    Vital signs reviewed.   General: No acute distress, alert and oriented  Eyes: conjunctiva clear; pupils equally round and reactive  ENT: external ears and nose atraumatic; oropharynx clear  CV: no peripheral edema  Resp: normal respiratory effort  Skin: no rashes or wounds; normal turgor  Psych: mood and affect appropriate; recent and remote memory intact        Ortho Exam     Left shoulder:  Active forward flexion 155 degrees with 4-/5 " strength.   Active external rotation 45 degrees, 4/5 strength.  Internal rotation L2, 4+/5 strength.   Belly press test.  Negative bear hug sign.   No tenderness over AC joint.   Negative cross arm test.   Positive empty can test.   Positive Ramirez and Neer's.   Negative drop arm test.   Negative external rotation lag sign.   Positive deltoid 3 components.   Brisk capillary refill to all digits.   2+ radial pulse, left wrist.   Positive sensation to light touch in all distributions, left hand as well as proximal lateral upper extremity, symmetric to the right.     Imaging:    MRI Shoulder Left Without Contrast    Result Date: 3/20/2024  Impression: 1.Heterogeneous signal is noted throughout the rotator cuff indicating changes of tendinopathy and prior rotator cuff repair. There is evidence for a small focal residual or recurrent full-thickness perforation involving the mid aspect of the middle fibers of the supraspinatus component of the cuff. There is no retraction of fibers. 2.Postoperative changes are noted status post prior biceps tenodesis. 3.Evidence for residual changes of prior SLAP type tear. 4.There is evidence for a displaced floating orthopedic anchor within the anterolateral aspect of the subacromial/subdeltoid bursal space. This finding correlates with the findings on the prior radiographs. 5.Evidence for postoperative changes associated with the acromion and acromioclavicular joint. The findings suggest prior partial acromioplasty/distal partial clavicular resection. 6.Mild to moderate osteoarthritis of the glenohumeral joint. Electronically Signed: Jeff Severino MD  3/20/2024 1:19 PM EDT  Workstation ID: [id number]    XR Shoulder 2+ View Left    Result Date: 3/13/2024  Ordering physician's impression is located in the Encounter Note dated 03/13/24.      Reviewed outside MRI left shoulder including review of imaging as well as radiology report indicates small region of recurrent full-thickness  tearing of central portion of the supraspinatus with no evidence of retraction, displaced orthopedic anchor appears to be in the subacromial space.  Mild to moderate degenerative changes of the glenohumeral joint noted.    Assessment:        1. Nontraumatic tear of left rotator cuff, unspecified tear extent    2. Acute pain of left shoulder    3. Status post left rotator cuff repair    Displaced orthopedic implants-left shoulder rotator cuff anchor located in subacromial space    Plan:        I discussed treatment options with the patient.   At this point in time, given the fact that he does have a displaced loose body presumably from previous rotator cuff anchor in his subacromial space with feelings of catching and impingement as well as evidence of a small region of recurrent full thickness tear to his rotator cuff and given the fact that he has had failure of conservative treatment as noted above, we discussed options.   The plan for surgery will be left shoulder arthroscopy with removal of displaced implants, rotator cuff repair, subacromial decompression, and all associated procedures.   Plan will be for left shoulder arthroscopy, rotator cuff debridement versus repair, removal of loose body, subacromial decompression and all associated procedures. I reviewed risks benefits and alternatives the procedure with risks including not limited to neurovascular damage, bleeding, infection, chronic pain, re-tear rotator cuff, failure of healing rotator cuff, loss of motion, weakness, stiffness, instability, biceps sag, DVT, pulmonary embolus, death, stroke, complex regional pain syndrome, myocardial infarction, need for additional procedures. He understood all these had all questions answered.  Patient verbally consented to proceed with surgery.  No guarantees were given regarding results of surgery.  We will have patient medically optimized by primary care physician and proceed with surgery at next available date.    The patient denies the history of DVT or pulmonary embolus. He denies cardiac history. He has a history of diabetes and his most recent hemoglobin A1c is approximately 6 percent.   All questions were answered today.    Luke Irizarry II was in agreement with plan and had all questions answered.       Orders:  No orders of the defined types were placed in this encounter.      Medications:  No orders of the defined types were placed in this encounter.      Followup:  No follow-ups on file.    Diagnoses and all orders for this visit:    1. Nontraumatic tear of left rotator cuff, unspecified tear extent (Primary)    2. Acute pain of left shoulder    3. Status post left rotator cuff repair          Dictated utilizing Dragon dictation     Transcribed from ambient dictation for Binh Tavarez MD by Josefina Villagomez.  03/21/24   13:37 EDT    Patient or patient representative verbalized consent to the visit recording.  I have personally performed the services described in this document as transcribed by the above individual, and it is both accurate and complete.

## 2024-03-22 RX ORDER — ACETAMINOPHEN 325 MG/1
1000 TABLET ORAL ONCE
OUTPATIENT
Start: 2024-03-22 | End: 2024-03-22

## 2024-03-22 RX ORDER — PREGABALIN 150 MG/1
150 CAPSULE ORAL ONCE
OUTPATIENT
Start: 2024-03-22 | End: 2024-03-22

## 2024-03-22 RX ORDER — MELOXICAM 7.5 MG/1
15 TABLET ORAL ONCE
OUTPATIENT
Start: 2024-03-22 | End: 2024-03-22

## 2024-03-26 PROBLEM — M24.012 LOOSE BODY IN JOINT OF LEFT SHOULDER REGION: Status: ACTIVE | Noted: 2024-03-21

## 2024-03-26 PROBLEM — M75.102 NONTRAUMATIC TEAR OF LEFT ROTATOR CUFF: Status: ACTIVE | Noted: 2024-03-21

## 2024-04-01 ENCOUNTER — PRE-ADMISSION TESTING (OUTPATIENT)
Dept: PREADMISSION TESTING | Facility: HOSPITAL | Age: 54
End: 2024-04-01
Payer: MEDICARE

## 2024-04-01 VITALS
BODY MASS INDEX: 40.27 KG/M2 | RESPIRATION RATE: 12 BRPM | DIASTOLIC BLOOD PRESSURE: 82 MMHG | WEIGHT: 297.3 LBS | HEIGHT: 72 IN | HEART RATE: 76 BPM | OXYGEN SATURATION: 97 % | SYSTOLIC BLOOD PRESSURE: 132 MMHG

## 2024-04-01 LAB
QT INTERVAL: 407 MS
QTC INTERVAL: 410 MS

## 2024-04-01 PROCEDURE — 85730 THROMBOPLASTIN TIME PARTIAL: CPT | Performed by: ORTHOPAEDIC SURGERY

## 2024-04-01 PROCEDURE — 93005 ELECTROCARDIOGRAM TRACING: CPT

## 2024-04-01 PROCEDURE — 80048 BASIC METABOLIC PNL TOTAL CA: CPT | Performed by: ORTHOPAEDIC SURGERY

## 2024-04-01 PROCEDURE — 85025 COMPLETE CBC W/AUTO DIFF WBC: CPT | Performed by: ORTHOPAEDIC SURGERY

## 2024-04-01 PROCEDURE — 85610 PROTHROMBIN TIME: CPT | Performed by: ORTHOPAEDIC SURGERY

## 2024-04-01 PROCEDURE — 83036 HEMOGLOBIN GLYCOSYLATED A1C: CPT | Performed by: ORTHOPAEDIC SURGERY

## 2024-04-01 NOTE — PAT
Pt here for PAT visit.  Pre-op tests completed, chg soap given, and instructions reviewed.  Instructed clears until 2 hrs prior to arrival time, voiced understanding. No history of MRSA or any reaction to any metals noted per patient.

## 2024-04-01 NOTE — DISCHARGE INSTRUCTIONS
PRE-ADMISSION TESTING INSTRUCTIONS FOR ADULTS    Take these medications the morning of surgery with a small sip of water: No Ozempic until after your procedure, Albuterol, Duo-Neb, Amlodipine, Pantoprazole      Do not take any insulin or diabetes medications the morning of surgery.      No aspirin, advil, aleve, ibuprofen, naproxen, diet pills, decongestants, or herbal/vitamins for a week prior to surgery.       Tylenol/Acetaminophen is okay to take if needed.    General Instructions:    DO NOT EAT SOLID FOOD AFTER MIDNIGHT THE NIGHT BEFORE SURGERY. No gum, mints, or hard candy after midnight the night before surgery.  You may drink clear liquids the day of surgery up until 2 hours before your arrival time.  Clear liquids are liquids you can see through. Nothing RED in color.    Plain water    Sports drinks      Gelatin (Jell-O)  Fruit juices without pulp such as white grape juice and apple juice  Popsicles that contain no fruit or yogurt  Tea or coffee (no cream or milk added)    It is beneficial for you to have a clear drink that contains carbohydrates 2 hours before your arrival time.  We suggest a 20 ounce bottle of Gatorade or Powerade for non-diabetic patients or a 20 ounce bottle of Gatorade Zero or Powerade Zero for diabetic patients.     Patients who avoid smoking, chewing tobacco and alcohol for 4 weeks prior to surgery have a reduced risk of post-operative complications.  If at all possible, quit smoking as many days before surgery as you can.    Do not smoke, use chewing tobacco or drink alcohol the day of surgery    Bring your C-PAP/ BI-PAP machine if you use one.  Wear clean comfortable clothes.  Do not wear contact lenses, lotion, deodorant, or make-up.  Bring a case for your glasses if applicable. You may brush your teeth the morning of surgery.  You may wear dentures/partials, do not put adhesive/glue on them.  Leave all other jewelry and valuables at home.      Preventing a Surgical Site  Infection:    Shower the night before and on the morning of surgery using the chlorhexidine soap you were given.  Use a clean washcloth with the soap.  Place clean sheets on your bed after showering the night before surgery. Do not use the CHG soap on your hair, face, or private areas. Wash your body gently for five (5) minutes. Do not scrub your skin.  Dry with a clean towel and dress in clean clothing.  Do not shave the surgical area for 10 days-2 weeks prior to surgery  because the razor can irritate skin and make it easier to develop an infection.  Make sure you, your family, and all healthcare providers clean their hands with soap and water or an alcohol based hand  before caring for you or your wound.      Day of surgery:    Your surgeon’s office will advise you of your arrival time for the day of surgery.    Upon arrival, a Pre-op nurse and Anesthesia provider will review your health history, obtain vital signs, and answer questions you may have. The anesthesia provider will also discuss the type of anesthesia that will be needed for your procedure, which may include general anesthesia. The only belongings needed at this time will be your home medications and if applicable your C-PAP/BI-PAP machine.  If you are staying overnight your family can leave the rest of your belongings in the car and bring them to your room later.  A Pre-op nurse will start an IV and you may receive medication in preparation for surgery, including something to help you relax.  Your family will be able to see you in the Pre-op area.  While you are in surgery your family should notify the waiting room  if they leave the waiting room area and provide a contact phone number.    IF you have any questions, you can call the Pre-Admission Department at [phone] or your surgeon's office.  Notify your surgeon if  you become sick, have a fever, productive cough, or cannot be here the day of surgery    Please be aware  that surgery does come with discomfort.  We want to make every effort to control your discomfort so please discuss any uncontrolled symptoms with your nurse.   Your doctor will most likely have prescribed pain medications.      If you are going home after surgery, you will receive individualized written care instructions before being discharged.  A responsible adult (over the age of 18) must drive you to and from the hospital on the day of your surgery and stay with you for 24 hours after anesthesia.    If you are staying overnight following surgery, you will be transported to your hospital room following the recovery period.  [facility] has all private rooms.    You may receive a survey regarding the care you received. Your feedback is very important and will be used to collect the necessary data to help us to continue to provide excellent care.     Deductibles and co-payments are collected on the day of service. Please be prepared to pay the required co-pay, deductible or deposit on the day of service as defined by your plan.

## 2024-04-10 ENCOUNTER — ANESTHESIA EVENT (OUTPATIENT)
Dept: PERIOP | Facility: HOSPITAL | Age: 54
End: 2024-04-10
Payer: MEDICARE

## 2024-04-11 ENCOUNTER — TELEPHONE (OUTPATIENT)
Dept: ORTHOPEDIC SURGERY | Facility: CLINIC | Age: 54
End: 2024-04-11
Payer: MEDICARE

## 2024-04-11 NOTE — TELEPHONE ENCOUNTER
Called Dr Morrell's office to inquire about clearance note from patients 4/8 appt. I was informed patient no showed appt and had not returned their phone call.  Attempted to call patient to let him know surgery has to be cancelled, per NAK until clearance is obtained. No answer and voicemail box is full. When patient calls back, please transfer him to the office.

## 2024-04-12 ENCOUNTER — APPOINTMENT (OUTPATIENT)
Dept: GENERAL RADIOLOGY | Facility: HOSPITAL | Age: 54
End: 2024-04-12
Payer: MEDICARE

## 2024-04-12 ENCOUNTER — HOSPITAL ENCOUNTER (OUTPATIENT)
Facility: HOSPITAL | Age: 54
Setting detail: HOSPITAL OUTPATIENT SURGERY
Discharge: HOME | End: 2024-04-12
Attending: ORTHOPAEDIC SURGERY | Admitting: ORTHOPAEDIC SURGERY
Payer: MEDICARE

## 2024-04-12 ENCOUNTER — ANESTHESIA (OUTPATIENT)
Dept: PERIOP | Facility: HOSPITAL | Age: 54
End: 2024-04-12
Payer: MEDICARE

## 2024-04-12 VITALS
OXYGEN SATURATION: 93 % | BODY MASS INDEX: 40.01 KG/M2 | WEIGHT: 295 LBS | SYSTOLIC BLOOD PRESSURE: 169 MMHG | DIASTOLIC BLOOD PRESSURE: 95 MMHG | HEART RATE: 77 BPM | RESPIRATION RATE: 16 BRPM | TEMPERATURE: 97.6 F

## 2024-04-12 DIAGNOSIS — M24.012 LOOSE BODY IN JOINT OF LEFT SHOULDER REGION: ICD-10-CM

## 2024-04-12 DIAGNOSIS — M75.102 NONTRAUMATIC TEAR OF LEFT ROTATOR CUFF, UNSPECIFIED TEAR EXTENT: ICD-10-CM

## 2024-04-12 LAB — GLUCOSE BLDC GLUCOMTR-MCNC: 143 MG/DL (ref 70–130)

## 2024-04-12 PROCEDURE — 25010000002 HYDROMORPHONE 1 MG/ML SOLUTION: Performed by: NURSE ANESTHETIST, CERTIFIED REGISTERED

## 2024-04-12 PROCEDURE — 25010000002 ONDANSETRON PER 1 MG: Performed by: NURSE ANESTHETIST, CERTIFIED REGISTERED

## 2024-04-12 PROCEDURE — 25010000002 FENTANYL CITRATE (PF) 50 MCG/ML SOLUTION

## 2024-04-12 PROCEDURE — 29823 SHO ARTHRS SRG XTNSV DBRDMT: CPT | Performed by: ORTHOPAEDIC SURGERY

## 2024-04-12 PROCEDURE — 25010000002 MIDAZOLAM PER 1MG: Performed by: NURSE ANESTHETIST, CERTIFIED REGISTERED

## 2024-04-12 PROCEDURE — 25010000002 PROPOFOL 200 MG/20ML EMULSION

## 2024-04-12 PROCEDURE — 25010000002 DEXAMETHASONE PER 1 MG: Performed by: NURSE ANESTHETIST, CERTIFIED REGISTERED

## 2024-04-12 PROCEDURE — 25810000003 SODIUM CHLORIDE 0.9 % SOLUTION 500 ML FLEX CONT: Performed by: ORTHOPAEDIC SURGERY

## 2024-04-12 PROCEDURE — 82948 REAGENT STRIP/BLOOD GLUCOSE: CPT

## 2024-04-12 PROCEDURE — 25010000002 SUGAMMADEX 200 MG/2ML SOLUTION: Performed by: NURSE ANESTHETIST, CERTIFIED REGISTERED

## 2024-04-12 PROCEDURE — 25010000002 VANCOMYCIN 1 G RECONSTITUTED SOLUTION 1 EACH VIAL: Performed by: ORTHOPAEDIC SURGERY

## 2024-04-12 PROCEDURE — 25010000002 PHENYLEPHRINE 10 MG/ML SOLUTION

## 2024-04-12 PROCEDURE — 29827 SHO ARTHRS SRG RT8TR CUF RPR: CPT | Performed by: ORTHOPAEDIC SURGERY

## 2024-04-12 PROCEDURE — 25010000002 SUCCINYLCHOLINE PER 20 MG

## 2024-04-12 PROCEDURE — 25010000002 FENTANYL CITRATE (PF) 50 MCG/ML SOLUTION: Performed by: NURSE ANESTHETIST, CERTIFIED REGISTERED

## 2024-04-12 PROCEDURE — 25810000003 LACTATED RINGERS PER 1000 ML: Performed by: NURSE ANESTHETIST, CERTIFIED REGISTERED

## 2024-04-12 PROCEDURE — 25010000002 BUPIVACAINE (PF) 0.5 % SOLUTION

## 2024-04-12 DEVICE — ULTRATAPE SUTURE COBRAID BLUE, 6                                    PER BOX
Type: IMPLANTABLE DEVICE | Site: SHOULDER | Status: FUNCTIONAL
Brand: ULTRATAPE

## 2024-04-12 RX ORDER — MIDAZOLAM HYDROCHLORIDE 2 MG/2ML
1 INJECTION, SOLUTION INTRAMUSCULAR; INTRAVENOUS
Status: DISCONTINUED | OUTPATIENT
Start: 2024-04-12 | End: 2024-04-12 | Stop reason: HOSPADM

## 2024-04-12 RX ORDER — ONDANSETRON 4 MG/1
4 TABLET, FILM COATED ORAL EVERY 8 HOURS PRN
Qty: 30 TABLET | Refills: 0 | Status: SHIPPED | OUTPATIENT
Start: 2024-04-12

## 2024-04-12 RX ORDER — SODIUM CHLORIDE 9 MG/ML
40 INJECTION, SOLUTION INTRAVENOUS AS NEEDED
Status: DISCONTINUED | OUTPATIENT
Start: 2024-04-12 | End: 2024-04-12 | Stop reason: HOSPADM

## 2024-04-12 RX ORDER — BUPIVACAINE HYDROCHLORIDE 5 MG/ML
INJECTION, SOLUTION EPIDURAL; INTRACAUDAL
Status: COMPLETED | OUTPATIENT
Start: 2024-04-12 | End: 2024-04-12

## 2024-04-12 RX ORDER — SODIUM CHLORIDE 0.9 % (FLUSH) 0.9 %
10 SYRINGE (ML) INJECTION EVERY 12 HOURS SCHEDULED
Status: DISCONTINUED | OUTPATIENT
Start: 2024-04-12 | End: 2024-04-12 | Stop reason: HOSPADM

## 2024-04-12 RX ORDER — HYDROCODONE BITARTRATE AND ACETAMINOPHEN 5; 325 MG/1; MG/1
1 TABLET ORAL ONCE AS NEEDED
Status: COMPLETED | OUTPATIENT
Start: 2024-04-12 | End: 2024-04-12

## 2024-04-12 RX ORDER — SODIUM CHLORIDE, SODIUM LACTATE, POTASSIUM CHLORIDE, CALCIUM CHLORIDE 600; 310; 30; 20 MG/100ML; MG/100ML; MG/100ML; MG/100ML
100 INJECTION, SOLUTION INTRAVENOUS ONCE
Status: DISCONTINUED | OUTPATIENT
Start: 2024-04-12 | End: 2024-04-12 | Stop reason: HOSPADM

## 2024-04-12 RX ORDER — PROPOFOL 10 MG/ML
INJECTION, EMULSION INTRAVENOUS AS NEEDED
Status: DISCONTINUED | OUTPATIENT
Start: 2024-04-12 | End: 2024-04-12 | Stop reason: SURG

## 2024-04-12 RX ORDER — PREGABALIN 75 MG/1
150 CAPSULE ORAL ONCE
Status: COMPLETED | OUTPATIENT
Start: 2024-04-12 | End: 2024-04-12

## 2024-04-12 RX ORDER — SODIUM CHLORIDE, SODIUM LACTATE, POTASSIUM CHLORIDE, CALCIUM CHLORIDE 600; 310; 30; 20 MG/100ML; MG/100ML; MG/100ML; MG/100ML
9 INJECTION, SOLUTION INTRAVENOUS CONTINUOUS
Status: DISCONTINUED | OUTPATIENT
Start: 2024-04-12 | End: 2024-04-12 | Stop reason: HOSPADM

## 2024-04-12 RX ORDER — DEXMEDETOMIDINE HYDROCHLORIDE 100 UG/ML
INJECTION, SOLUTION INTRAVENOUS
Status: COMPLETED | OUTPATIENT
Start: 2024-04-12 | End: 2024-04-12

## 2024-04-12 RX ORDER — ROCURONIUM BROMIDE 10 MG/ML
INJECTION, SOLUTION INTRAVENOUS AS NEEDED
Status: DISCONTINUED | OUTPATIENT
Start: 2024-04-12 | End: 2024-04-12 | Stop reason: SURG

## 2024-04-12 RX ORDER — MELOXICAM 7.5 MG/1
15 TABLET ORAL ONCE
Status: COMPLETED | OUTPATIENT
Start: 2024-04-12 | End: 2024-04-12

## 2024-04-12 RX ORDER — ACETAMINOPHEN 500 MG
1000 TABLET ORAL ONCE
Status: COMPLETED | OUTPATIENT
Start: 2024-04-12 | End: 2024-04-12

## 2024-04-12 RX ORDER — FENTANYL CITRATE 50 UG/ML
INJECTION, SOLUTION INTRAMUSCULAR; INTRAVENOUS AS NEEDED
Status: DISCONTINUED | OUTPATIENT
Start: 2024-04-12 | End: 2024-04-12 | Stop reason: SURG

## 2024-04-12 RX ORDER — FENTANYL CITRATE 50 UG/ML
50 INJECTION, SOLUTION INTRAMUSCULAR; INTRAVENOUS
Status: COMPLETED | OUTPATIENT
Start: 2024-04-12 | End: 2024-04-12

## 2024-04-12 RX ORDER — SODIUM CHLORIDE 0.9 % (FLUSH) 0.9 %
10 SYRINGE (ML) INJECTION AS NEEDED
Status: DISCONTINUED | OUTPATIENT
Start: 2024-04-12 | End: 2024-04-12 | Stop reason: HOSPADM

## 2024-04-12 RX ORDER — LIDOCAINE HYDROCHLORIDE 10 MG/ML
0.5 INJECTION, SOLUTION INFILTRATION; PERINEURAL ONCE AS NEEDED
Status: DISCONTINUED | OUTPATIENT
Start: 2024-04-12 | End: 2024-04-12 | Stop reason: HOSPADM

## 2024-04-12 RX ORDER — ONDANSETRON 2 MG/ML
4 INJECTION INTRAMUSCULAR; INTRAVENOUS ONCE AS NEEDED
Status: DISCONTINUED | OUTPATIENT
Start: 2024-04-12 | End: 2024-04-12 | Stop reason: HOSPADM

## 2024-04-12 RX ORDER — DEXAMETHASONE SODIUM PHOSPHATE 4 MG/ML
8 INJECTION, SOLUTION INTRA-ARTICULAR; INTRALESIONAL; INTRAMUSCULAR; INTRAVENOUS; SOFT TISSUE ONCE AS NEEDED
Status: COMPLETED | OUTPATIENT
Start: 2024-04-12 | End: 2024-04-12

## 2024-04-12 RX ORDER — SCOLOPAMINE TRANSDERMAL SYSTEM 1 MG/1
1 PATCH, EXTENDED RELEASE TRANSDERMAL
Status: DISCONTINUED | OUTPATIENT
Start: 2024-04-12 | End: 2024-04-12 | Stop reason: HOSPADM

## 2024-04-12 RX ORDER — OXYCODONE HYDROCHLORIDE AND ACETAMINOPHEN 5; 325 MG/1; MG/1
1 TABLET ORAL EVERY 4 HOURS PRN
Qty: 42 TABLET | Refills: 0 | Status: SHIPPED | OUTPATIENT
Start: 2024-04-12

## 2024-04-12 RX ORDER — ONDANSETRON 2 MG/ML
4 INJECTION INTRAMUSCULAR; INTRAVENOUS ONCE AS NEEDED
Status: COMPLETED | OUTPATIENT
Start: 2024-04-12 | End: 2024-04-12

## 2024-04-12 RX ORDER — BUPIVACAINE HYDROCHLORIDE 5 MG/ML
INJECTION, SOLUTION EPIDURAL; INTRACAUDAL
Status: DISCONTINUED | OUTPATIENT
Start: 2024-04-12 | End: 2024-04-12 | Stop reason: SURG

## 2024-04-12 RX ORDER — FAMOTIDINE 10 MG/ML
20 INJECTION, SOLUTION INTRAVENOUS
Status: COMPLETED | OUTPATIENT
Start: 2024-04-12 | End: 2024-04-12

## 2024-04-12 RX ORDER — SENNOSIDES A AND B 8.6 MG/1
1 TABLET, FILM COATED ORAL NIGHTLY
Qty: 20 TABLET | Refills: 0 | Status: SHIPPED | OUTPATIENT
Start: 2024-04-12

## 2024-04-12 RX ORDER — PHENYLEPHRINE HYDROCHLORIDE 10 MG/ML
INJECTION INTRAVENOUS AS NEEDED
Status: DISCONTINUED | OUTPATIENT
Start: 2024-04-12 | End: 2024-04-12 | Stop reason: SURG

## 2024-04-12 RX ORDER — ASPIRIN 81 MG/1
81 TABLET ORAL DAILY
Qty: 14 TABLET | Refills: 0 | Status: SHIPPED | OUTPATIENT
Start: 2024-04-12

## 2024-04-12 RX ORDER — SUCCINYLCHOLINE CHLORIDE 20 MG/ML
INJECTION INTRAMUSCULAR; INTRAVENOUS AS NEEDED
Status: DISCONTINUED | OUTPATIENT
Start: 2024-04-12 | End: 2024-04-12 | Stop reason: SURG

## 2024-04-12 RX ORDER — LIDOCAINE HYDROCHLORIDE 20 MG/ML
INJECTION, SOLUTION INFILTRATION; PERINEURAL AS NEEDED
Status: DISCONTINUED | OUTPATIENT
Start: 2024-04-12 | End: 2024-04-12 | Stop reason: SURG

## 2024-04-12 RX ADMIN — DEXMEDETOMIDINE 30 MCG: 100 INJECTION, SOLUTION, CONCENTRATE INTRAVENOUS at 12:42

## 2024-04-12 RX ADMIN — SCOPOLAMINE 1 PATCH: 1.5 PATCH, EXTENDED RELEASE TRANSDERMAL at 11:26

## 2024-04-12 RX ADMIN — ROCURONIUM BROMIDE 45 MG: 10 INJECTION, SOLUTION INTRAVENOUS at 13:50

## 2024-04-12 RX ADMIN — PROPOFOL 250 MG: 10 INJECTION, EMULSION INTRAVENOUS at 13:41

## 2024-04-12 RX ADMIN — MIDAZOLAM HYDROCHLORIDE 1 MG: 1 INJECTION, SOLUTION INTRAMUSCULAR; INTRAVENOUS at 12:25

## 2024-04-12 RX ADMIN — FENTANYL CITRATE 50 MCG: 50 INJECTION, SOLUTION INTRAMUSCULAR; INTRAVENOUS at 15:44

## 2024-04-12 RX ADMIN — ROCURONIUM BROMIDE 20 MG: 10 INJECTION, SOLUTION INTRAVENOUS at 14:28

## 2024-04-12 RX ADMIN — BUPIVACAINE HYDROCHLORIDE 15 ML: 5 INJECTION, SOLUTION EPIDURAL; INTRACAUDAL; PERINEURAL at 12:42

## 2024-04-12 RX ADMIN — HYDROCODONE BITARTRATE AND ACETAMINOPHEN 1 TABLET: 5; 325 TABLET ORAL at 15:31

## 2024-04-12 RX ADMIN — SODIUM CHLORIDE, POTASSIUM CHLORIDE, SODIUM LACTATE AND CALCIUM CHLORIDE 9 ML/HR: 600; 310; 30; 20 INJECTION, SOLUTION INTRAVENOUS at 10:54

## 2024-04-12 RX ADMIN — LIDOCAINE HYDROCHLORIDE 100 MG: 20 INJECTION, SOLUTION INFILTRATION; PERINEURAL at 13:41

## 2024-04-12 RX ADMIN — PHENYLEPHRINE HYDROCHLORIDE 100 MCG: 10 INJECTION INTRAVENOUS at 13:59

## 2024-04-12 RX ADMIN — VANCOMYCIN HYDROCHLORIDE 2000 MG: 1 INJECTION, POWDER, LYOPHILIZED, FOR SOLUTION INTRAVENOUS at 11:48

## 2024-04-12 RX ADMIN — MELOXICAM 15 MG: 7.5 TABLET ORAL at 10:51

## 2024-04-12 RX ADMIN — HYDROMORPHONE HYDROCHLORIDE 0.5 MG: 1 INJECTION, SOLUTION INTRAMUSCULAR; INTRAVENOUS; SUBCUTANEOUS at 15:58

## 2024-04-12 RX ADMIN — BUPIVACAINE HYDROCHLORIDE 20 ML: 5 INJECTION, SOLUTION EPIDURAL; INTRACAUDAL at 16:35

## 2024-04-12 RX ADMIN — FAMOTIDINE 20 MG: 10 INJECTION, SOLUTION INTRAVENOUS at 10:48

## 2024-04-12 RX ADMIN — ROCURONIUM BROMIDE 5 MG: 10 INJECTION, SOLUTION INTRAVENOUS at 13:41

## 2024-04-12 RX ADMIN — ACETAMINOPHEN 1000 MG: 500 TABLET ORAL at 10:51

## 2024-04-12 RX ADMIN — DEXAMETHASONE SODIUM PHOSPHATE 8 MG: 4 INJECTION, SOLUTION INTRAMUSCULAR; INTRAVENOUS at 10:50

## 2024-04-12 RX ADMIN — ONDANSETRON 4 MG: 2 INJECTION INTRAMUSCULAR; INTRAVENOUS at 10:48

## 2024-04-12 RX ADMIN — PHENYLEPHRINE HYDROCHLORIDE 100 MCG: 10 INJECTION INTRAVENOUS at 14:10

## 2024-04-12 RX ADMIN — SUGAMMADEX 268 MG: 100 INJECTION, SOLUTION INTRAVENOUS at 15:03

## 2024-04-12 RX ADMIN — PREGABALIN 150 MG: 75 CAPSULE ORAL at 10:51

## 2024-04-12 RX ADMIN — SUCCINYLCHOLINE CHLORIDE 200 MG: 20 INJECTION, SOLUTION INTRAMUSCULAR; INTRAVENOUS at 13:41

## 2024-04-12 RX ADMIN — FENTANYL CITRATE 50 MCG: 50 INJECTION, SOLUTION INTRAMUSCULAR; INTRAVENOUS at 13:41

## 2024-04-12 RX ADMIN — FENTANYL CITRATE 50 MCG: 50 INJECTION, SOLUTION INTRAMUSCULAR; INTRAVENOUS at 15:31

## 2024-04-12 NOTE — ANESTHESIA PROCEDURE NOTES
Peripheral Block    Pre-sedation assessment completed: 4/12/2024 4:25 PM    Patient reassessed immediately prior to procedure    Patient location during procedure: post-op  Start time: 4/12/2024 4:29 PM  Stop time: 4/12/2024 4:35 PM  Reason for block: at surgeon's request and post-op pain management  Performed by  CRNA/CAA: Marcella Cuevas CRNA  Preanesthetic Checklist  Completed: patient identified, IV checked, site marked, risks and benefits discussed, surgical consent, monitors and equipment checked, pre-op evaluation and timeout performed  Prep:  Pt Position: supine  Sterile barriers:cap, gloves, mask and washed/disinfected hands  Prep: ChloraPrep  Patient monitoring: blood pressure monitoring, continuous pulse oximetry and EKG  Procedure    Sedation: yes  Performed under: local infiltration  Guidance:ultrasound guided    ULTRASOUND INTERPRETATION.  Using ultrasound guidance a 21 G gauge needle was placed in close proximity to the nerve, at which point, under ultrasound guidance anesthetic was injected in the area of the nerve and spread of the anesthesia was seen on ultrasound in close proximity thereto.  There were no abnormalities seen on ultrasound; a digital image was taken; and the patient tolerated the procedure with no complications. Images:still images obtained, printed/placed on chart    Laterality:left  Block Type:interscalene  Injection Technique:single-shot  Needle Type:echogenic  Needle Gauge:21 G  Resistance on Injection: none    Medications Used: bupivacaine PF (MARCAINE) injection 0.5% - Injection   20 mL - 4/12/2024 4:35:00 PM      Post Assessment  Injection Assessment: negative aspiration for heme, no paresthesia on injection and incremental injection  Patient Tolerance:comfortable throughout block  Complications:no

## 2024-04-12 NOTE — ANESTHESIA PREPROCEDURE EVALUATION
Anesthesia Evaluation     Patient summary reviewed and Nursing notes reviewed   history of anesthetic complications:  PONV  NPO Solid Status: > 8 hours  NPO Liquid Status: > 2 hours           Airway   Mallampati: II  TM distance: >3 FB  Neck ROM: full  Large neck circumference and Possible difficult intubation  Dental - normal exam         Pulmonary - normal exam   (+) a smoker (quit 20 years ago) Former, asthma (inhaler when it gets too hot outside),sleep apnea on CPAP  Cardiovascular - normal exam  Exercise tolerance: good (4-7 METS)    ECG reviewed    (+) hypertension well controlled 2 medications or greater  (-) hyperlipidemia    ROS comment: HEART RATE= 61  bpm  RR Interval= 984  ms  CO Interval= 168  ms  P Horizontal Axis= 31  deg  P Front Axis= 25  deg  QRSD Interval= 104  ms  QT Interval= 407  ms  QTcB= 410  ms  QRS Axis= 32  deg  T Wave Axis= 54  deg  - NORMAL ECG -  Sinus rhythm  No change from prior tracing  Electronically Signed By: Tigre Hernandez ([facility]) 01-Apr-2024 19:37:37  Date and Time of Study: 2024-04-01 11:53:44    Echo 2022:   · Calculated left ventricular EF = 62.8% Estimated left ventricular EF was in agreement with the calculated left ventricular EF. Left ventricular systolic function is normal.  · Left ventricular diastolic function is consistent with (grade I) impaired relaxation.      Stress Test 2022:  · Findings consistent with a normal ECG stress test.  · Diaphragmatic attenuation and GI artifacts are present.  · Left ventricular ejection fraction is normal. (Calculated EF = 54%).  · Myocardial perfusion imaging indicates a normal myocardial perfusion study with no evidence of ischemia.  · Impressions are consistent with a low risk study.  · There is no prior study available for comparison.      Neuro/Psych  (+) headaches (rare), numbness (left leg numbness)  GI/Hepatic/Renal/Endo    (+) GERD well controlled, diabetes mellitus type 2 well controlled    Musculoskeletal     (+) back pain,  chronic pain, radiculopathy Left lower extremity      ROS comment: L4-S1 fusion    Abdominal   (+) obese   Substance History - negative use     OB/GYN          Other   arthritis,     ROS/Med Hx Other: Water sips at 1045 with meds                    Anesthesia Plan    ASA 3     general with block     (Consents to ISB)  intravenous induction     Anesthetic plan, risks, benefits, and alternatives have been provided, discussed and informed consent has been obtained with: patient and spouse/significant other.  Pre-procedure education provided  Use of blood products discussed with patient and spouse/significant other  Consented to blood products.    Plan discussed with CRNA.        CODE STATUS:

## 2024-04-12 NOTE — OP NOTE
Date of Operation: 4/12/2024     PREOPERATIVE DIAGNOSIS:  Left shoulder retained loose body in the subacromial space  Left shoulder rotator cuff tear  Left shoulder subacromial bursitis    POSTOPERATIVE DIAGNOSIS:    Left shoulder routine loose body and subacromial space  Left shoulder longitudinal rotator cuff tear-supraspinatus  Left shoulder subacromial bursitis  Left shoulder labral tear-degenerative  Left shoulder glenohumeral chondromalacia    PROCEDURE PERFORMED:   Left shoulder arthroscopic rotator cuff repair-supraspinatus  Left shoulder arthroscopy with extensive debridement-glenoid, humeral head, labrum, subacromial bursa, rotator cuff-subscapularis  Left shoulder arthroscopic removal loose body    SURGEON: Binh Tavarez MD     ASSISTANT: Assistant: Rodri English CSA was responsible for performing the following activities: Retraction, Irrigation, Closing, Placing Dressing, and Held/Positioned Camera and their skilled assistance was necessary for the success of this case.     ANESTHESIA: General endotracheal anesthesia with regional block.       ESTIMATED BLOOD LOSS:  20 mL     URINE OUTPUT: Not recorded.       FLUIDS: Per anesthesia.       COMPLICATIONS: None.       SPECIMENS: None.       DRAINS: None.      IMPLANTS: Ultra tape x 2     ARTHROSCOPIC FINDINGS:   1.  Glenoid-2 chondral wear central glenoid   2.  Humeral head-grade 3 chondral wear central humeral head  3.  Labrum-diffuse degenerative tearing of the labrum including superior, anterior, and posterior labrum  4.  Biceps tendon-absent from intra-articular space  5.  Rotator cuff-upper border partial-thickness tearing of subscapularis that was debrided, longitudinal near full-thickness tearing of supraspinatus repaired with ultra tape x 2 in side-to-side fashion  6.  Axillary pouch-free loose bodies  7.  Subacromial space-moderately thickened subacromial bursa, no significant degenerative change to AC joint     INDICATIONS FOR PROCEDURE:  Patient is a pleasant 54 y.o. who has had significant limitation and use of left shoulder as well as associated pain with failure of conservative treatments.  Patient was also noted to have a loose body from prior anchor on both x-rays and MRI in the subacromial space with complaints of symptoms consistent with impingement of loose body.  I discussed treatment options available to the patient and patient wished to proceed with surgical treatment. I explained details of the procedure, as well as the risks, benefits, and alternatives as documented on history and physical, and the patient had all questions answered prior to signing the operative consent form. No guarantees were given in regard to results of the surgery.       DESCRIPTION OF PROCEDURE: The patient was seen, evaluated, and cleared for surgery by anesthesia. Admitted in the preoperative holding area. The operative site was marked, consent was reviewed, history and physical was updated, and preoperative labs were reviewed. A regional block was then placed per anesthesia. The patient was then taken to the operating room and placed in a supine position on a beachchair table. After successful intubation per anesthesia, facemask was placed securing head at this point time with the neck in normal anatomic position.  Patient was then elevated up into a seated position with neck maintained in normal anatomic alignment. All bony prominences were well-padded and patient was secured to the table with a waist strap. The left  upper extremity was then sterilely prepped and draped in a standard fashion.       A formal timeout was completed, including confirmation of History and Physical, operative consent, surgical site, patient identification number, and preoperative antibiotic administration.       Attention was then turned to creation of posterior portal with a 11 blade followed by insertion of blunt trocar and cannula.  Scope was inserted at this point time.   Anterior portal was then made in the rotator interval with spinal needle using outside in technique.  Cannula was then inserted over a trocar and diagnostic arthroscopic exam was carried out at this point in time with findings as noted above.     Attention was then turned to debridement of glenohumeral joint space including debridement of the superior, anterior, posterior labral fraying with combination of hand-held shaver and ablation wand.  Attention was then turned to debridement of the chondral wear of both the glenoid and humeral head with ablation wand as well as shaver, debrided to a smooth stable edge with no residual delaminating portions noted.  Attention was then turned to debridement of the superior border the subscapularis with partial-thickness tear noted.  This was debrided with ablation wand as well as shaver as well.  Remainder of subscapularis noted to be intact with no region of full-thickness tearing.     Attention was then returned to the subacromial space where the scope was inserted through the posterior portal, cannula inserted through the anterior portal and subacromial space was evaluated at this point in time.  Debridement of subacromial bursitis was completed with shaver as well as ablation wand at this point time     Attention was then turned to removal of loose body-grasper was used to remove anchor as well as associated suture from the subacromial space at this time.    Attention was then turned to repair of the rotator cuff longitudinal tear with Acu pass device used in side-to-side fashion.  Ultra tape was passed x 2, each pair sequentially tied with a sliding neck knot followed by 4 alternating half inches ensuring good movement not security.  Sutures were cut short at this time.    Fluid was evacuated with suction and arthroscopic instruments were removed at this point time.     Attention was then turned to closure the wounds with 3-0 nylon in interrupted fashion.  Wounds were  dressed with Xeroform gauze, 4 x 4, Tegaderm, ABD pad, Medipore tape and patient was placed in a sling with abduction pillow to the left side.     At the end of the procedure, all lap, needle, and sponge counts were correct x2. The patient had brisk capillary refill to all digits of the left upper extremity. Compartments were soft and easily compressible at the end of the procedure.       DISPOSITION: The patient was extubated per anesthesia and taken to the recovery room in stable condition. Will follow up in office in 1 week for wound check. Results discussed immediately after procedure with family and all questions were answered at that time.       REHAB: We will place patient on [facility] rotator cuff repair protocol, sling x 4 weeks, begin physical therapy at week 2.

## 2024-04-12 NOTE — ANESTHESIA POSTPROCEDURE EVALUATION
Patient: Luke PATEL Irizarry II    Procedure Summary       Date: 04/12/24 Room / Location:  [facility] 3 /  [facility]    Anesthesia Start: 1327 Anesthesia Stop: 1522    Procedure: SHOULDER ARTHROSCOPY WITH ROTATOR CUFF REPAIR, loose body removal (Left: Shoulder) Diagnosis:       Nontraumatic tear of left rotator cuff, unspecified tear extent      Loose body in joint of left shoulder region      (Nontraumatic tear of left rotator cuff, unspecified tear extent [M75.102])      (Loose body in joint of left shoulder region [M24.012])    Surgeons: Binh Tavarez MD Provider: Veda Reyes CRNA    Anesthesia Type: general with block ASA Status: 3            Anesthesia Type: general with block    Vitals  Vitals Value Taken Time   /83 04/12/24 1650   Temp 97.6 °F (36.4 °C) 04/12/24 1523   Pulse 76 04/12/24 1652   Resp 17 04/12/24 1650   SpO2 91 % 04/12/24 1652   Vitals shown include unfiled device data.        Post Anesthesia Care and Evaluation    Patient location during evaluation: bedside  Patient participation: complete - patient participated  Level of consciousness: awake and alert  Pain score: 0  Pain management: adequate    Airway patency: patent  Anesthetic complications: No anesthetic complications  PONV Status: none  Cardiovascular status: acceptable  Respiratory status: acceptable  Hydration status: acceptable  No anesthesia care post op

## 2024-04-12 NOTE — ANESTHESIA PROCEDURE NOTES
Peripheral Block    Pre-sedation assessment completed: 4/12/2024 12:24 PM    Patient reassessed immediately prior to procedure    Patient location during procedure: pre-op  Start time: 4/12/2024 12:31 PM  Stop time: 4/12/2024 12:42 PM  Reason for block: at surgeon's request and post-op pain management  Performed by  CRNA/CAA: Veda Reyes CRNASRNA: Maryann Barnes SRNA  Preanesthetic Checklist  Completed: patient identified, IV checked, site marked, risks and benefits discussed, surgical consent, monitors and equipment checked, pre-op evaluation and timeout performed  Prep:  Pt Position: supine  Sterile barriers:cap, gloves, mask and washed/disinfected hands  Prep: ChloraPrep  Patient monitoring: blood pressure monitoring, continuous pulse oximetry and EKG  Procedure    Sedation: yes  Performed under: local infiltration  Guidance:ultrasound guided    ULTRASOUND INTERPRETATION.  Using ultrasound guidance a 21 G gauge needle was placed in close proximity to the brachial plexus nerve, at which point, under ultrasound guidance anesthetic was injected in the area of the nerve and spread of the anesthesia was seen on ultrasound in close proximity thereto.  There were no abnormalities seen on ultrasound; a digital image was taken; and the patient tolerated the procedure with no complications. Images:still images obtained, printed/placed on chart    Block Type:interscalene  Injection Technique:single-shot  Needle Type:echogenic  Needle Gauge:21 G  Resistance on Injection: none    Medications Used: dexmedetomidine HCl (PRECEDEX) injection - Intravenous   30 mcg - 4/12/2024 12:42:00 PM  bupivacaine PF (MARCAINE) injection 0.5% - Injection   15 mL - 4/12/2024 12:42:00 PM      Post Assessment  Injection Assessment: negative aspiration for heme, no paresthesia on injection and incremental injection  Patient Tolerance:comfortable throughout block  Complications:no

## 2024-04-12 NOTE — H&P
Orthopedic H&P    Subjective:     Patient ID: Luke Irizarry II is a 54 y.o. male.    Chief Complaint:  Left shoulder pain, rotator cuff tear, retained loose body    History of Present Illness  Luke Irizarry II presents for surgical treatment of left shoulder pain and catching sensations.     Left shoulder pain  This has been going on for approximately 3 months. He denies any specific injury prior to the onset of the symptoms. He does have a history of a previous rotator cuff repair and biceps tenodesis greater than 10 years ago by Dr. Carlos Eduardo Houston. He denies any numbness or tingling in the left upper extremity. He localizes pain to the lateral subacromial space. He does note that this is the same reason where he feels a catching type of sensation when it does happen. He denies any issues with his previous incisions. He is right hand dominant. He has had minimal improvement with soft tissue massage, activity modification, anti-inflammatory medications, and home exercises which he has been doing for greater than 6 weeks. He denies any numbness or tingling. He does get some mild radiation of pain down the lateral upper extremity, but not below the level of the elbow.    He is a type 2 diabetic. His last hemoglobin A1c was 6.7 percent. He denies any heart issues. The adult female asks how long surgery and recovery would be.      No current facility-administered medications on file prior to encounter.     Current Outpatient Medications on File Prior to Encounter   Medication Sig Dispense Refill    Accu-Chek FastClix Lancets misc CHECK GLUCOSE TWICE DAILY      albuterol sulfate  (90 Base) MCG/ACT inhaler Inhale 2 puffs As Needed.      amLODIPine (NORVASC) 10 MG tablet TAKE 1 TABLET BY MOUTH EVERY DAY FOR 30 DAYS      cyclobenzaprine (FLEXERIL) 10 MG tablet Take 1 tablet by mouth As Needed.      diclofenac (VOLTAREN) 75 MG EC tablet TAKE 1 TABLET BY MOUTH TWICE A DAY WITH FOOD OR MILK      HYDROcodone-acetaminophen  (NORCO) 5-325 MG per tablet 1 tablet As Needed. For back and left leg      ipratropium-albuterol (DUO-NEB) 0.5-2.5 mg/3 ml nebulizer TAKE 3 ML BY NEBULIZATION EVERY 4 (FOUR) HOURS AS NEEDED FOR WHEEZING OR SHORTNESS OF AIR. 360 mL 1    lisinopril (PRINIVIL,ZESTRIL) 10 MG tablet Take 1 tablet by mouth Daily. 30 tablet 0    melatonin 3 MG tablet Take 1 tablet by mouth As Needed.      Ozempic, 0.25 or 0.5 MG/DOSE, 2 MG/3ML solution pen-injector 0.5 mg Subcutaneous weekly for 28 days      pantoprazole (PROTONIX) 40 MG EC tablet Take 1 tablet by mouth Daily. 90 tablet 3    pregabalin (LYRICA) 75 MG capsule Take 1 capsule by mouth 2 (Two) Times a Day.      Spacer/Aero-Holding Chambers (AeroChamber MV) inhaler Use as instructed 1 each 0    Tradjenta 5 MG tablet tablet TAKE 1 TABLET BY MOUTH EVERY DAY 30 tablet 0     Allergies   Allergen Reactions    Amoxicillin-Pot Clavulanate GI Intolerance, Unknown - High Severity and Other (See Comments)    Adhesive Tape Rash     Other reaction(s): Blisters    Tape Rash, Unknown - High Severity and Other (See Comments)       Social History     Occupational History    Not on file   Tobacco Use    Smoking status: Former     Current packs/day: 0.00     Average packs/day: 1 pack/day for 7.0 years (7.0 ttl pk-yrs)     Types: Cigarettes     Start date:      Quit date:      Years since quittin.2     Passive exposure: Past    Smokeless tobacco: Never   Vaping Use    Vaping status: Never Used   Substance and Sexual Activity    Alcohol use: No    Drug use: Never    Sexual activity: Yes     Partners: Female      Past Medical History:   Diagnosis Date    Arthritis     RIGHT HAND    Asthma     Diabetes mellitus     GERD (gastroesophageal reflux disease)     H/O wisdom tooth extraction     Hearing deficit     Hyperlipidemia     Hypertension     Knee sprain     Knee swelling     Migraines     PONV (postoperative nausea and vomiting)     Right hip pain     sched BRAD    Sleep apnea     uses  machine    Spinal headache     AFTER SPINAL TAP REQUIRED BLOOD PATCH     Past Surgical History:   Procedure Laterality Date    APPENDECTOMY      BACK SURGERY      X 2    BACK SURGERY  12/07/2020    CATARACT EXTRACTION      RIGHT EYE CATARACT REMOVED    JOINT REPLACEMENT      KNEE SURGERY Left 2011    LAPAROSCOPIC CHOLECYSTECTOMY      SHOULDER SURGERY Bilateral     X 2 Dr. Houston    THYROIDECTOMY, PARTIAL      TOTAL HIP ARTHROPLASTY Left 6/21/2016    Procedure: TOTAL HIP ARTHROPLASTY;  Surgeon: Sanchez Goel MD;  Location:  [facility];  Service:     TOTAL HIP ARTHROPLASTY Right 8/16/2016    Procedure: TOTAL HIP ARTHROPLASTY depuy summit 7fr hemovac drain placement;  Surgeon: Sanchez Goel MD;  Location:  [facility] OR;  Service:     WRIST SURGERY Left        Family History   Problem Relation Age of Onset    Lung disease Father     COPD Father     Cancer Sister     Cancer Paternal Aunt     Diabetes Maternal Grandmother     Diabetes Maternal Grandfather     Diabetes Paternal Grandmother     Diabetes Paternal Grandfather          Review of Systems        Objective:  Vitals:    04/12/24 1004   Temp: 97.8 °F (36.6 °C)   TempSrc: Oral   Weight: 134 kg (295 lb)         04/12/24  1004   Weight: 134 kg (295 lb)     Body mass index is 40.01 kg/m².  Physical Exam    Vital signs reviewed.   General: No acute distress, alert and oriented  Eyes: conjunctiva clear; pupils equally round and reactive  ENT: external ears and nose atraumatic; oropharynx clear  CV: no peripheral edema  Resp: normal respiratory effort  Skin: no rashes or wounds; normal turgor  Psych: mood and affect appropriate; recent and remote memory intact  Debilities: None      Ortho Exam     Left shoulder:  Active forward flexion 155 degrees with 4-/5 strength.   Active external rotation 45 degrees, 4/5 strength.  Internal rotation L2, 4+/5 strength.   Belly press test.  Negative bear hug sign.   No tenderness over AC joint.   Negative cross arm test.   Positive empty can  test.   Positive Ramirez and Neer's.   Negative drop arm test.   Negative external rotation lag sign.   Positive deltoid 3 components.   Brisk capillary refill to all digits.   2+ radial pulse, left wrist.   Positive sensation to light touch in all distributions, left hand as well as proximal lateral upper extremity, symmetric to the right.     Imaging:    MRI Shoulder Left Without Contrast    Result Date: 3/20/2024  Impression: 1.Heterogeneous signal is noted throughout the rotator cuff indicating changes of tendinopathy and prior rotator cuff repair. There is evidence for a small focal residual or recurrent full-thickness perforation involving the mid aspect of the middle fibers of the supraspinatus component of the cuff. There is no retraction of fibers. 2.Postoperative changes are noted status post prior biceps tenodesis. 3.Evidence for residual changes of prior SLAP type tear. 4.There is evidence for a displaced floating orthopedic anchor within the anterolateral aspect of the subacromial/subdeltoid bursal space. This finding correlates with the findings on the prior radiographs. 5.Evidence for postoperative changes associated with the acromion and acromioclavicular joint. The findings suggest prior partial acromioplasty/distal partial clavicular resection. 6.Mild to moderate osteoarthritis of the glenohumeral joint. Electronically Signed: Jeff Severino MD  3/20/2024 1:19 PM EDT  Workstation ID: [id number]    XR Shoulder 2+ View Left    Result Date: 3/13/2024  Ordering physician's impression is located in the Encounter Note dated 03/13/24.      Reviewed outside MRI left shoulder including review of imaging as well as radiology report indicates small region of recurrent full-thickness tearing of central portion of the supraspinatus with no evidence of retraction, displaced orthopedic anchor appears to be in the subacromial space.  Mild to moderate degenerative changes of the glenohumeral joint  noted.    Assessment:        1. Nontraumatic tear of left rotator cuff, unspecified tear extent    2. Acute pain of left shoulder    3. Status post left rotator cuff repair    Displaced orthopedic implants-left shoulder rotator cuff anchor located in subacromial space    Plan:        I discussed treatment options with the patient.   At this point in time, given the fact that he does have a displaced loose body presumably from previous rotator cuff anchor in his subacromial space with feelings of catching and impingement as well as evidence of a small region of recurrent full thickness tear to his rotator cuff and given the fact that he has had failure of conservative treatment as noted above, we discussed options.  He would like to proceed with surgical treatment at this time  The plan for surgery will be left shoulder arthroscopy with removal of displaced implants, rotator cuff repair, subacromial decompression, and all associated procedures.   Plan will be for left shoulder arthroscopy, rotator cuff debridement versus repair, removal of loose body, subacromial decompression and all associated procedures. I reviewed risks benefits and alternatives the procedure with risks including not limited to neurovascular damage, bleeding, infection, chronic pain, re-tear rotator cuff, failure of healing rotator cuff, loss of motion, weakness, stiffness, instability, biceps sag, DVT, pulmonary embolus, death, stroke, complex regional pain syndrome, myocardial infarction, need for additional procedures. He understood all these had all questions answered.  Patient consented to proceed with surgery.  No guarantees were given regarding results of surgery.  We will have patient medically optimized by primary care physician and proceed with surgery at next available date.   The patient denies the history of DVT or pulmonary embolus. He denies cardiac history. He has a history of diabetes and his most recent hemoglobin A1c is  approximately 6 percent.       Luke Irizarry II was in agreement with plan and had all questions answered.

## 2024-04-12 NOTE — ANESTHESIA PROCEDURE NOTES
Airway  Urgency: elective    Date/Time: 4/12/2024 1:44 PM  Airway not difficult    General Information and Staff    Patient location during procedure: OR  CRNA/CAA: Veda Reyes CRNA    Indications and Patient Condition  Indications for airway management: airway protection    Preoxygenated: yes  Mask difficulty assessment: 1 - vent by mask    Final Airway Details  Final airway type: endotracheal airway      Successful airway: ETT  Cuffed: yes   Successful intubation technique: video laryngoscopy  Facilitating devices/methods: intubating stylet  Endotracheal tube insertion site: oral  Blade: Reilly  Blade size: 3X.  ETT size (mm): 7.5  Cormack-Lehane Classification: grade I - full view of glottis  Placement verified by: chest auscultation and capnometry   Measured from: lips  Number of attempts at approach: 1  Assessment: lips, teeth, and gum same as pre-op and atraumatic intubation

## 2024-04-19 ENCOUNTER — OFFICE VISIT (OUTPATIENT)
Dept: ORTHOPEDIC SURGERY | Facility: CLINIC | Age: 54
End: 2024-04-19
Payer: MEDICARE

## 2024-04-19 VITALS — WEIGHT: 295 LBS | BODY MASS INDEX: 39.96 KG/M2 | HEIGHT: 72 IN

## 2024-04-19 DIAGNOSIS — Z98.890 STATUS POST ARTHROSCOPY OF SHOULDER: Primary | ICD-10-CM

## 2024-04-19 RX ORDER — PREDNISONE 10 MG/1
TABLET ORAL
Qty: 18 TABLET | Refills: 0 | Status: SHIPPED | OUTPATIENT
Start: 2024-04-19

## 2024-04-19 NOTE — PROGRESS NOTES
CC: F/u s/p left shoulder arthroscopic rotator cuff repair of supraspinatus, extensive debridement of glenoid humeral head, labrum, subacromial bursa, rotator cuff subscapularis removal loose body DOS 2024    Interval History: Patient returns to clinic stating pain is doing fairly well, has been using sling as instructed, denies any numbness or tingling over left arm. No fevers, chills, or sweats, and no drainage from incisions noted.    Exam:   Left shoulder- incisions clean, dry, sutures in place -incisions appear to be healing does report history of allergy to adhesive dressing he is experiencing raised erythemic rash along the axilla posterior aspect of the shoulder but not along the incisions   Positive sensation all distributions left hand and proximal lateral aspect arm   Cap refill < 3 seconds, radial pulse 2+   Positive deltoid firing   Flex/extend fingers/thumb/wrist with 4+/5 strength, positive thumbs up, okay sign, cross finger adduction and abduction against resistance     Impression: s/p left shoulder arthroscopic rotator cuff repair of supraspinatus, extensive debridement of glenoid humeral head, labrum, subacromial bursa, rotator cuff subscapularis removal loose body     Plan:  1. D/c sutures today and replace with steri-strips- may shower, no submerging wounds x 4 weeks  2. F/u in 3 wks. we did discuss the importance of continuing with use of the sling for proper healing.  Will start oral steroid taper for treatment of allergic reaction with dressing.  3. Will start PT at 2 wk germaine, utilizing [facility] rotator cuff repair protocol. Continue use of sling x 4 weeks. Work on finger and wrist ROM. May do gentle elbow ROM 2x/day while out of sling for showering or changing clothes.   4. All questions answered      New Medications Ordered This Visit   Medications    predniSONE (DELTASONE) 10 MG tablet     Si mg daily x 3 days, 20 mg daily x 3 days, 10 mg daily x 3 days     Dispense:  18 tablet      Refill:  0       Orders Placed This Encounter   Procedures    Ambulatory Referral to Physical Therapy POST OP     Referral Priority:   Routine     Referral Type:   Physical Therapy     Referral Reason:   Specialty Services Required     Requested Specialty:   Physical Therapy     Number of Visits Requested:   1

## 2024-04-26 ENCOUNTER — HOSPITAL ENCOUNTER (OUTPATIENT)
Dept: PHYSICAL THERAPY | Facility: HOSPITAL | Age: 54
Setting detail: THERAPIES SERIES
Discharge: HOME | End: 2024-04-26
Payer: MEDICARE

## 2024-04-26 DIAGNOSIS — Z98.890 STATUS POST ROTATOR CUFF REPAIR: Primary | ICD-10-CM

## 2024-04-26 PROCEDURE — 97161 PT EVAL LOW COMPLEX 20 MIN: CPT

## 2024-04-26 NOTE — THERAPY EVALUATION
Outpatient Physical Therapy Ortho Initial Evaluation  MEGHANA Still     Patient Name: Luke Irizarry II  : 1970  MRN: [id number]  Today's Date: 2024      Visit Date: 2024    Patient Active Problem List   Diagnosis    Primary osteoarthritis of left hip    Osteoarthritis of hip    Primary osteoarthritis of both knees    Primary osteoarthritis of right knee    Status post rotator cuff repair, right    Chronic right shoulder pain    Acromioclavicular joint separation    Rupture of posterior cruciate ligament of right knee    Tear of medial collateral ligament of right knee    Benign essential hypertension    Allergic rhinitis    Chronic low back pain    Gastroesophageal reflux disease    Herniation of lumbar intervertebral disc with radiculopathy    Nontraumatic tear of left rotator cuff    Loose body in joint of left shoulder region        Past Medical History:   Diagnosis Date    Arthritis     RIGHT HAND    Asthma     Diabetes mellitus     GERD (gastroesophageal reflux disease)     H/O wisdom tooth extraction     Hearing deficit     Hyperlipidemia     Hypertension     Knee sprain     Knee swelling     Migraines     PONV (postoperative nausea and vomiting)     Right hip pain     sched BRAD    Sleep apnea     uses machine    Spinal headache     AFTER SPINAL TAP REQUIRED BLOOD PATCH        Past Surgical History:   Procedure Laterality Date    APPENDECTOMY      BACK SURGERY      X 2    BACK SURGERY  2020    CATARACT EXTRACTION      RIGHT EYE CATARACT REMOVED    JOINT REPLACEMENT      KNEE SURGERY Left     LAPAROSCOPIC CHOLECYSTECTOMY      SHOULDER ARTHROSCOPY W/ ROTATOR CUFF REPAIR Left 2024    Procedure: SHOULDER ARTHROSCOPY WITH ROTATOR CUFF REPAIR, loose body removal;  Surgeon: Binh Tavarez MD;  Location: [facility];  Service: Orthopedics;  Laterality: Left;    SHOULDER SURGERY Bilateral     X 2 Dr. Houston    THYROIDECTOMY, PARTIAL      TOTAL HIP ARTHROPLASTY Left 2016     Procedure: TOTAL HIP ARTHROPLASTY;  Surgeon: Sanchez Goel MD;  Location:  [facility];  Service:     TOTAL HIP ARTHROPLASTY Right 8/16/2016    Procedure: TOTAL HIP ARTHROPLASTY depuy summit 7fr hemovac drain placement;  Surgeon: Sanchez Goel MD;  Location:  [facility];  Service:     WRIST SURGERY Left        Visit Dx:     ICD-10-CM ICD-9-CM   1. Status post rotator cuff repair  Z98.890 V45.89          Patient History       Row Name 04/26/24 0900             History    Chief Complaint Difficulty with daily activities;Joint stiffness;Muscle tenderness;Muscle weakness;Pain  -AS      Type of Pain Shoulder pain  -AS      Brief Description of Current Complaint Luke Juan C presents to outpatient PT s/p left RC repair. Surgery was performed on 3-26-24 by Dr. Tavarez. Patient was D/C the same day with referral for outpatient PT. Patient is to wear sling on LUE until 4 weeks post op and begin PT at week 2 post op. Patient reports pain has been well controlled with pain medication and the use of ice.  -AS      Previous treatment for THIS PROBLEM Surgery  -AS      Surgery Date: 04/12/24  -AS      Patient/Caregiver Goals Relieve pain;Return to prior level of function;Improve strength  -AS      Patient's Rating of General Health Good  -AS      Patient seeing anyone else for problem(s)? Dr. Tavarez  -AS      How has patient tried to help current problem? rest, ice, medication  -AS      What clinical tests have you had for this problem? MRI  -AS      Results of Clinical Tests RC tear  -AS      Surgery/Hospitalization Left RC repair 4-12-24  -AS         Pain     Pain Location Shoulder  -AS      Pain Frequency Intermittent  -AS      Pain Description Aching  -AS      What Performance Factors Make the Current Problem(s) WORSE? Activity  -AS      What Performance Factors Make the Current Problem(s) BETTER? Rest  -AS         Daily Activities    Primary Language English  -AS      Are you able to read Yes  -AS      Are you able to write Yes  -AS       How does patient learn best? Listening;Reading;Demonstration  -AS      Teaching needs identified Home Exercise Program;Management of Condition  -AS      Patient is concerned about/has problems with Difficulty with self care (i.e. bathing, dressing, toileting:;Flexibility;Performing home management (household chores, shopping, care of dependents);Performing job responsibilities/community activities (work, school,;Performing sports, recreation, and play activities;Reaching over head;Repetitive movements of the hand, arm, shoulder  -AS      Does patient have problems with the following? None  -AS      Barriers to learning None  -AS      Pt Participated in POC and Goals Yes  -AS         Safety    Are you being hurt, hit, or frightened by anyone at home or in your life? No  -AS      Are you being neglected by a caregiver No  -AS                User Key  (r) = Recorded By, (t) = Taken By, (c) = Cosigned By      Initials Name Provider Type    AS Imtiaz Reece, PT Physical Therapist                     PT Ortho       Row Name 04/26/24 0900       Precautions and Contraindications    Precautions/Limitations shoulder precautions  RC repair 4-12-24  -AS       Posture/Observations    Posture- WNL Posture is WNL  -AS    Observations Incision healing  -AS       Left Upper Ext    Lt Shoulder Abduction PROM 180  -AS    Lt Shoulder Flexion PROM 165  -AS    Lt Shoulder External Rotation PROM 70  -AS    Lt Shoulder Internal Rotation PROM 78  -AS       MMT Left Upper Ext    Lt Shoulder Flexion MMT, Gross Movement --  Not tested at IE due to recent surgery  -AS    Lt Shoulder ABduction MMT, Gross Movement --  Not tested at IE due to recent surgery  -AS    Lt Shoulder Internal Rotation MMT, Gross Movement --  Not tested at IE due to recent surgery  -AS    Lt Shoulder External Rotation MMT, Gross Movement --  Not tested at IE due to recent surgery  -AS       Sensation    Sensation WNL? WNL  -AS    Light Touch No apparent  deficits  -AS              User Key  (r) = Recorded By, (t) = Taken By, (c) = Cosigned By      Initials Name Provider Type    AS Imtiaz Reece, PT Physical Therapist                                Therapy Education  Given: HEP  Program: New  How Provided: Verbal, Demonstration, Written  Provided to: Patient  Level of Understanding: Teach back education performed, Verbalized, Demonstrated      PT OP Goals       Row Name 04/26/24 0900          PT Short Term Goals    STG Date to Achieve 05/17/24  -AS     STG 1 Patient to demonstrate compliance with initial HEP for flexibility, ROM and strengthening.  -AS     STG 2 Patient to report left shoulder pain on VAS of 4-5/10 at worst with activity.  -AS     STG 3 Patient to demonstrate improved left shoulder strength to 4/5 in all planes.  -AS     STG 4 Patient to demonstrate improved left shoulder PROM to within 10 degrees of contralateral shoulder.  -AS        Long Term Goals    LTG Date to Achieve 06/07/24  -AS     LTG 1 Patient to demonstrate compliance with advanced HEP for flexibility, ROM and strengthening.  -AS     LTG 2 Patient to report left shoulder pain on VAS of 1-2/10 at worst with activity.  -AS     LTG 3 Patient to demonstrate improved left shoulder strength to 4+/5 in all planes.  -AS     LTG 4 Patient to demonstrate improved left shoulder AROM to WFL in all planes.  -AS     LTG 5 Patient to report improved function and decreased pain Quick DASH by >10-15 points.  -AS        Time Calculation    PT Goal Re-Cert Due Date 05/24/24  -AS               User Key  (r) = Recorded By, (t) = Taken By, (c) = Cosigned By      Initials Name Provider Type    AS Imtiaz Reece, PT Physical Therapist                     PT Assessment/Plan       Row Name 04/26/24 0900          PT Assessment    Functional Limitations Limitation in home management;Limitations in community activities;Performance in leisure activities;Performance in self-care ADL;Performance in sport  activities;Performance in work activities  -AS     Impairments Impaired flexibility;Muscle strength;Pain;Range of motion  -AS     Assessment Comments Luke Irizarry presents to outpatient PT s/p left RC repair. Surgery was performed on 3-26-24 by Dr. Tavarez. Patient was D/C the same day with referral for outpatient PT. Patient is to wear sling on LUE until 4 weeks post op and begin PT at week 2 post op. Patient reports pain has been well controlled with pain medication and the use of ice. Patient has very good left shoulder PROM, limited left shoulder and LUE strength, and increased pain and discomfort with activity. Patient has limited function at this time secondary to the above.  -AS     Please refer to paper survey for additional self-reported information Yes  -AS     Rehab Potential Good  -AS     Patient/caregiver participated in establishment of treatment plan and goals Yes  -AS     Patient would benefit from skilled therapy intervention Yes  -AS        PT Plan    PT Frequency 2x/week  -AS     Predicted Duration of Therapy Intervention (PT) 8-10 weeks  -AS     Planned CPT's? PT RE-EVAL: 99814;PT THER PROC EA 15 MIN: 30903;PT THER ACT EA 15 MIN: 00533;PT MANUAL THERAPY EA 15 MIN: 95805;PT NEUROMUSC RE-EDUCATION EA 15 MIN: 89335  -AS               User Key  (r) = Recorded By, (t) = Taken By, (c) = Cosigned By      Initials Name Provider Type    AS Imtiaz Reece, PT Physical Therapist                     Modalities       Row Name 04/26/24 0900             Moist Heat    MH Applied Yes  -AS      Location Left Shoulder - Sitting  -AS      PT Moist Heat Minutes 10  -AS      MH Prior to Rx Yes  -AS                User Key  (r) = Recorded By, (t) = Taken By, (c) = Cosigned By      Initials Name Provider Type    AS Imtiaz Reece, PT Physical Therapist                   OP Exercises       Row Name 04/26/24 0900             Subjective Pain    Able to rate subjective pain? yes  -AS      Pre-Treatment Pain Level  2  -AS      Post-Treatment Pain Level 2  -AS                User Key  (r) = Recorded By, (t) = Taken By, (c) = Cosigned By      Initials Name Provider Type    AS Imtiaz Reece, PT Physical Therapist                  Manual Rx (Last 36 Hours)       Manual Treatments       Row Name 04/26/24 0900             Manual Rx 1    Manual Rx 1 Location Left Shoulder  -AS      Manual Rx 1 Type PROM - Flex, ABD, IR, ER  -AS      Manual Rx 1 Duration 15 min  -AS                User Key  (r) = Recorded By, (t) = Taken By, (c) = Cosigned By      Initials Name Provider Type    AS Imtiaz Reece, PT Physical Therapist                                Outcome Measure Options: Quick DASH  Quick DASH  Open a tight or new jar.: No Difficulty  Do heavy household chores (e.g., wash walls, wash floors): Mild Difficulty  Carry a shopping bag or briefcase: Moderate Difficulty  Wash your back: Mild Difficulty  Use a knife to cut food: No Difficulty  Recreational activities in which you take some force or impact through your arm, should or hand (e.g. golf, hammering, tennis, etc.): Moderate Difficulty  During the past week, to what extent has your arm, shoulder, or hand problem interfered with your normal social activites with family, friends, neighbors or groups?: Slightly  During the past week, were you limited in your work or other regular daily activities as a result of your arm, shoulder or hand problem?: Moderately Limited  Arm, Shoulder, or hand pain: Mild  Tingling (pins and needles) in your arm, shoulder, or hand: Moderate  During the past week, how much difficulty have you had sleeping because of the pain in your arm, shoulder or hand?: Mild Difficulty  Number of Questions Answered: 11  Quick DASH Score: 29.55         Time Calculation:     Start Time: 0850  Stop Time: 0946  Time Calculation (min): 56 min  Untimed Charges  PT Moist Heat Minutes: 10  Total Minutes  Untimed Charges Total Minutes: 10   Total Minutes: 10      Therapy Charges for Today       Code Description Service Date Service Provider Modifiers Qty    [id number] HC PT EVAL LOW COMPLEXITY 4 4/26/2024 Imtiaz Reece, PT GP 1            PT G-Codes  Outcome Measure Options: Quick DASH  Quick DASH Score: 29.55         Imtiaz Reece, PT  4/26/2024

## 2024-05-10 ENCOUNTER — HOSPITAL ENCOUNTER (OUTPATIENT)
Dept: PHYSICAL THERAPY | Facility: HOSPITAL | Age: 54
Setting detail: THERAPIES SERIES
Discharge: HOME | End: 2024-05-10
Payer: MEDICARE

## 2024-05-10 DIAGNOSIS — Z98.890 STATUS POST ROTATOR CUFF REPAIR: Primary | ICD-10-CM

## 2024-05-10 PROCEDURE — 97110 THERAPEUTIC EXERCISES: CPT

## 2024-05-13 ENCOUNTER — OFFICE VISIT (OUTPATIENT)
Dept: ORTHOPEDIC SURGERY | Facility: CLINIC | Age: 54
End: 2024-05-13
Payer: MEDICARE

## 2024-05-13 VITALS — BODY MASS INDEX: 39.96 KG/M2 | HEIGHT: 72 IN | WEIGHT: 295 LBS

## 2024-05-13 DIAGNOSIS — Z98.890 STATUS POST ARTHROSCOPY OF SHOULDER: Primary | ICD-10-CM

## 2024-05-13 PROCEDURE — 99024 POSTOP FOLLOW-UP VISIT: CPT | Performed by: ORTHOPAEDIC SURGERY

## 2024-05-13 PROCEDURE — 1160F RVW MEDS BY RX/DR IN RCRD: CPT | Performed by: ORTHOPAEDIC SURGERY

## 2024-05-13 PROCEDURE — 1159F MED LIST DOCD IN RCRD: CPT | Performed by: ORTHOPAEDIC SURGERY

## 2024-05-13 NOTE — PROGRESS NOTES
CC: F/u s/p left shoulder rotator cuff repair and subacromial decompression  DOS 4/12/2024    Interval History: Patient returns to clinic stating pain is doing fairly well, has been using sling as instructed, denies any numbness or tingling over left arm. No fevers, chills, or sweats, and no drainage from incisions noted. He has started into physical therapy.    Exam:   Left shoulder- incisions healing well   Tolerates FF-170,   ER-50   Active forward flexion 170 degrees with 4 out of 5 strength, active external rotation 50 degrees with 4+ out of 5 strength   Positive sensation all distributions left hand and proximal lateral aspect arm, positive deltoid firing   Cap refill < 3 seconds, radial pulse 2+   Positive deltoid firing   Flex/extend fingers/thumb/wrist with 4+/5 strength, positive thumbs up, okay sign, cross finger adduction and abduction against resistance    REHAB: We will place patient on [facility] rotator cuff repair protocol, sling x 4 weeks, begin physical therapy at week 2.     Impression: s/p left shoulder rotator cuff repair and subacromial decompression     Plan:  1. Continue PT for work on ROM and progressing into strengthening per protocol  2. Discontinue sling  3. Instructed on passive ROM exercises to be done multiple times daily at home  4. F/u in 8 weeks to evaluate motion and progress with PT  5. All questions answered      No orders of the defined types were placed in this encounter.      No orders of the defined types were placed in this encounter.

## 2024-05-17 ENCOUNTER — HOSPITAL ENCOUNTER (OUTPATIENT)
Dept: PHYSICAL THERAPY | Facility: HOSPITAL | Age: 54
Setting detail: THERAPIES SERIES
Discharge: HOME | End: 2024-05-17
Payer: MEDICARE

## 2024-05-17 DIAGNOSIS — Z98.890 STATUS POST ROTATOR CUFF REPAIR: Primary | ICD-10-CM

## 2024-05-17 PROCEDURE — 97110 THERAPEUTIC EXERCISES: CPT

## 2024-05-17 NOTE — THERAPY TREATMENT NOTE
Outpatient Physical Therapy Ortho Treatment Note  MEGHANA Still     Patient Name: Luke Irizarry II  : 1970  MRN: [id number]  Today's Date: 2024      Visit Date: 2024    Visit Dx:    ICD-10-CM ICD-9-CM   1. Status post rotator cuff repair  Z98.890 V45.89       Patient Active Problem List   Diagnosis    Primary osteoarthritis of left hip    Osteoarthritis of hip    Primary osteoarthritis of both knees    Primary osteoarthritis of right knee    Status post rotator cuff repair, right    Chronic right shoulder pain    Acromioclavicular joint separation    Rupture of posterior cruciate ligament of right knee    Tear of medial collateral ligament of right knee    Benign essential hypertension    Allergic rhinitis    Chronic low back pain    Gastroesophageal reflux disease    Herniation of lumbar intervertebral disc with radiculopathy    Nontraumatic tear of left rotator cuff    Loose body in joint of left shoulder region        Past Medical History:   Diagnosis Date    Arthritis     RIGHT HAND    Asthma     Diabetes mellitus     GERD (gastroesophageal reflux disease)     H/O wisdom tooth extraction     Hearing deficit     Hyperlipidemia     Hypertension     Knee sprain     Knee swelling     Migraines     PONV (postoperative nausea and vomiting)     Right hip pain     sched BRAD    Sleep apnea     uses machine    Spinal headache     AFTER SPINAL TAP REQUIRED BLOOD PATCH        Past Surgical History:   Procedure Laterality Date    APPENDECTOMY      BACK SURGERY      X 2    BACK SURGERY  2020    CATARACT EXTRACTION      RIGHT EYE CATARACT REMOVED    JOINT REPLACEMENT      KNEE SURGERY Left     LAPAROSCOPIC CHOLECYSTECTOMY      SHOULDER ARTHROSCOPY W/ ROTATOR CUFF REPAIR Left 2024    Procedure: SHOULDER ARTHROSCOPY WITH ROTATOR CUFF REPAIR, loose body removal;  Surgeon: Binh Tavarez MD;  Location: [facility];  Service: Orthopedics;  Laterality: Left;    SHOULDER SURGERY Bilateral     X 2  Dr. Houston    THYROIDECTOMY, PARTIAL      TOTAL HIP ARTHROPLASTY Left 6/21/2016    Procedure: TOTAL HIP ARTHROPLASTY;  Surgeon: Sanchez Goel MD;  Location:  [facility];  Service:     TOTAL HIP ARTHROPLASTY Right 8/16/2016    Procedure: TOTAL HIP ARTHROPLASTY depuy summit 7fr hemovac drain placement;  Surgeon: Sanchez Goel MD;  Location:  [facility];  Service:     WRIST SURGERY Left                         PT Assessment/Plan       Row Name 05/17/24 0900          PT Assessment    Assessment Comments Progressed patient with strengthening exercises today and he tolerated this well. Patient continues to do very well with PT. Plan to continue to progress patient with exercises as tolerated.  -AS        PT Plan    PT Plan Comments Continue with current treatment plan.  -AS               User Key  (r) = Recorded By, (t) = Taken By, (c) = Cosigned By      Initials Name Provider Type    AS Imtiaz Reece, PT Physical Therapist                       OP Exercises       Row Name 05/17/24 0951 05/17/24 0900          Subjective    Subjective Comments -- Patient states his shoulder is a little sore this morning. He states overall he continues to do very well.  -AS        Total Minutes    36683 - PT Therapeutic Exercise Minutes 20  -AS --        Exercise 1    Exercise Name 1 -- S/L ER  -AS     Reps 1 -- 25  -AS     Time 1 -- 2#  -AS        Exercise 2    Exercise Name 2 -- Serratus Punches  -AS     Reps 2 -- 25  -AS     Time 2 -- 2#  -AS        Exercise 3    Exercise Name 3 -- Prone Y, T  -AS        Exercise 4    Exercise Name 4 -- Empty/Full Can  -AS     Reps 4 -- 25 each  -AS     Time 4 -- 2#  -AS        Exercise 5    Exercise Name 5 -- Rows  -AS     Reps 5 -- 25  -AS     Time 5 -- Silver  -AS        Exercise 6    Exercise Name 6 -- Extensions  -AS     Reps 6 -- 25  -AS     Time 6 -- Silver  -AS        Exercise 7    Exercise Name 7 -- IR  -AS     Reps 7 -- 25  -AS     Time 7 -- Silver  -AS        Exercise 8    Exercise Name 8  -- ER  -AS     Reps 8 -- 25  -AS     Time 8 -- Silver  -AS               User Key  (r) = Recorded By, (t) = Taken By, (c) = Cosigned By      Initials Name Provider Type    AS Imtiaz Reece, PT Physical Therapist                                                    Time Calculation:   Start Time: 0926  Stop Time: 0947  Time Calculation (min): 21 min  Timed Charges  36660 - PT Therapeutic Exercise Minutes: 20  Total Minutes  Timed Charges Total Minutes: 20   Total Minutes: 20  Therapy Charges for Today       Code Description Service Date Service Provider Modifiers Qty    [id number]  PT THER PROC EA 15 MIN 5/17/2024 Imtiaz Reece, PT GP 1                      Imtiaz Reece, PT  5/17/2024

## 2024-06-06 ENCOUNTER — APPOINTMENT (OUTPATIENT)
Dept: GENERAL RADIOLOGY | Facility: HOSPITAL | Age: 54
End: 2024-06-06
Payer: MEDICARE

## 2024-06-06 ENCOUNTER — HOSPITAL ENCOUNTER (EMERGENCY)
Facility: HOSPITAL | Age: 54
Discharge: HOME | End: 2024-06-06
Attending: EMERGENCY MEDICINE | Admitting: EMERGENCY MEDICINE
Payer: MEDICARE

## 2024-06-06 VITALS
SYSTOLIC BLOOD PRESSURE: 144 MMHG | BODY MASS INDEX: 39.55 KG/M2 | WEIGHT: 292 LBS | RESPIRATION RATE: 18 BRPM | TEMPERATURE: 97.9 F | DIASTOLIC BLOOD PRESSURE: 88 MMHG | HEART RATE: 81 BPM | OXYGEN SATURATION: 94 % | HEIGHT: 72 IN

## 2024-06-06 DIAGNOSIS — S60.221A CONTUSION OF DORSUM OF RIGHT HAND: Primary | ICD-10-CM

## 2024-06-06 PROCEDURE — 99283 EMERGENCY DEPT VISIT LOW MDM: CPT

## 2024-06-06 PROCEDURE — 73130 X-RAY EXAM OF HAND: CPT

## 2024-06-07 NOTE — ED PROVIDER NOTES
Subjective   History of Present Illness    Chief complaint: Hand injury    Location: Right hand    Quality/Severity: Swelling with tenderness    Timing/Onset/Duration: Around 5:00 PM today    Modifying Factors: Hurts to touch the injured area    Associated Symptoms: No numbness, tingling, or weakness.  There is some swelling.  No change in color or temperature.    Narrative: This 54-year-old struck his right hand on the dorsal surface of the hand against a counter when he was trying to break up dogs that were having a fight.  He denies being bitten.    PCP: Petros    Review of Systems   Musculoskeletal:         Right hand pain   Skin:  Negative for wound.   Neurological:  Negative for weakness and numbness.         Past Medical History:   Diagnosis Date    Arthritis     RIGHT HAND    Asthma     Diabetes mellitus     GERD (gastroesophageal reflux disease)     H/O wisdom tooth extraction     Hearing deficit     Hyperlipidemia     Hypertension     Knee sprain     Knee swelling     Migraines     PONV (postoperative nausea and vomiting)     Right hip pain     sched BRAD    Sleep apnea     uses machine    Spinal headache     AFTER SPINAL TAP REQUIRED BLOOD PATCH       Allergies   Allergen Reactions    Amoxicillin-Pot Clavulanate GI Intolerance, Unknown - High Severity and Other (See Comments)    Adhesive Tape Rash     Other reaction(s): Blisters    Tape Rash, Unknown - High Severity and Other (See Comments)       Past Surgical History:   Procedure Laterality Date    APPENDECTOMY      BACK SURGERY      X 2    BACK SURGERY  12/07/2020    CATARACT EXTRACTION      RIGHT EYE CATARACT REMOVED    JOINT REPLACEMENT      KNEE SURGERY Left 2011    LAPAROSCOPIC CHOLECYSTECTOMY      SHOULDER ARTHROSCOPY W/ ROTATOR CUFF REPAIR Left 4/12/2024    Procedure: SHOULDER ARTHROSCOPY WITH ROTATOR CUFF REPAIR, loose body removal;  Surgeon: Binh Tavarez MD;  Location: [facility];  Service: Orthopedics;  Laterality: Left;    SHOULDER SURGERY  Bilateral     X 2 Dr. Houston    THYROIDECTOMY, PARTIAL      TOTAL HIP ARTHROPLASTY Left 2016    Procedure: TOTAL HIP ARTHROPLASTY;  Surgeon: Sanchez Goel MD;  Location:  [facility];  Service:     TOTAL HIP ARTHROPLASTY Right 2016    Procedure: TOTAL HIP ARTHROPLASTY depuy summit 7fr hemovac drain placement;  Surgeon: Sanchez Goel MD;  Location:  [facility];  Service:     WRIST SURGERY Left        Family History   Problem Relation Age of Onset    Lung disease Father     COPD Father     Cancer Sister     Cancer Paternal Aunt     Diabetes Maternal Grandmother     Diabetes Maternal Grandfather     Diabetes Paternal Grandmother     Diabetes Paternal Grandfather        Social History     Socioeconomic History    Marital status:    Tobacco Use    Smoking status: Former     Current packs/day: 0.00     Average packs/day: 1 pack/day for 7.0 years (7.0 ttl pk-yrs)     Types: Cigarettes     Start date:      Quit date:      Years since quittin.4     Passive exposure: Past    Smokeless tobacco: Never   Vaping Use    Vaping status: Never Used   Substance and Sexual Activity    Alcohol use: No    Drug use: Never    Sexual activity: Yes     Partners: Female           Objective   Physical Exam  Vitals (The blood pressure is 144/88, temperature is 97.9 °F, pulse 81, respirations 18, room air pulse ox 94%.) and nursing note reviewed.   Constitutional:       Appearance: Normal appearance.   Musculoskeletal:      Comments: There is tenderness upon palpation the dorsal aspect of the right hand.  The capillary refills less than 2 seconds.  The sensation is intact.  There is a normal range of motion noted.  There is no joint laxity noted.  There is a 2+ radial pulse.  The right upper extremity is otherwise without tenderness or deformity neurovascular intact.   Skin:     General: Skin is warm and dry.      Capillary Refill: Capillary refill takes less than 2 seconds.      Findings: No rash.   Neurological:       Mental Status: He is alert.      Motor: No weakness.         Procedures           ED Course      22:38 EDT, 06/06/24:  The patient's diagnosis of right hand contusion was discussed with him.  The patient should ice the right hand for 20 minutes every 2-3 hours while awake for 2 to 3 days.  The patient should take Motrin or Tylenol as needed as directed for pain.  The patient should call Toya Mayfield in the morning for follow-up appointment within 1 week.  The patient should return to the emergency department if there is worsening pain, numbness, tingling, weakness, change in color or temperature of the right upper extremity, worse in any way at all.  All the patient questions were answered he will be discharged in good condition.                                       Medical Decision Making  Amount and/or Complexity of Data Reviewed  Radiology: ordered.        Final diagnoses:   None       ED Disposition  ED Disposition       None            No follow-up provider specified.       Medication List      No changes were made to your prescriptions during this visit.            Lupillo Mayfield MD  06/06/24 5647

## 2024-06-07 NOTE — ED TRIAGE NOTES
Personal dogs were fighting injured hand while trying to get dogs apart.  No open wound denies being bit is unsure if he hit his hand on counter or dog.  Swelling noted top of hand

## 2024-06-07 NOTE — DISCHARGE INSTRUCTIONS
Ice and elevate the right hand for 20 minutes every 2-3 hours while you are awake for 2 to 3 days.  Take Motrin or Tylenol as needed as directed for pain.  Call Toya Mayfield in the morning for follow-up appointment within 1 week.  Return to the emergency department there is increased pain, numbness, tingling, weakness, change in color or temperature of the right upper extremity, worse in any way at all.

## 2024-06-11 ENCOUNTER — OFFICE VISIT (OUTPATIENT)
Dept: ORTHOPEDIC SURGERY | Facility: CLINIC | Age: 54
End: 2024-06-11
Payer: MEDICARE

## 2024-06-11 VITALS — WEIGHT: 291 LBS | HEIGHT: 72 IN | BODY MASS INDEX: 39.42 KG/M2

## 2024-06-11 DIAGNOSIS — M62.50 MUSCULAR ATROPHY, UNSPECIFIED SITE: ICD-10-CM

## 2024-06-11 DIAGNOSIS — M70.61 TROCHANTERIC BURSITIS OF RIGHT HIP: Primary | ICD-10-CM

## 2024-06-11 PROCEDURE — 1160F RVW MEDS BY RX/DR IN RCRD: CPT | Performed by: ORTHOPAEDIC SURGERY

## 2024-06-11 PROCEDURE — 99213 OFFICE O/P EST LOW 20 MIN: CPT | Performed by: ORTHOPAEDIC SURGERY

## 2024-06-11 PROCEDURE — 1159F MED LIST DOCD IN RCRD: CPT | Performed by: ORTHOPAEDIC SURGERY

## 2024-06-11 NOTE — PROGRESS NOTES
Subjective: Right hip pain     Patient ID: Luke Irizarry II is a 54 y.o. male.    Chief Complaint:    History of Present Illness 55 male returns today actually reporting his left hip is giving him less discomfort since he began a walking program on a regular basis.  Again the MRI previously done showed moderate gluteal atrophy with some mild tendinopathy but no gluteal tears.  He does walk 2 to 3 days a week.       Social History     Occupational History    Not on file   Tobacco Use    Smoking status: Former     Current packs/day: 0.00     Average packs/day: 1 pack/day for 7.0 years (7.0 ttl pk-yrs)     Types: Cigarettes     Start date:      Quit date:      Years since quittin.4     Passive exposure: Past    Smokeless tobacco: Never   Vaping Use    Vaping status: Never Used   Substance and Sexual Activity    Alcohol use: No    Drug use: Never    Sexual activity: Yes     Partners: Female      Review of Systems   Constitutional:  Negative for chills, diaphoresis, fever and unexpected weight change.   HENT:  Negative for hearing loss, nosebleeds, sore throat and tinnitus.    Eyes:  Negative for pain and visual disturbance.   Respiratory:  Negative for cough, shortness of breath and wheezing.    Cardiovascular:  Negative for chest pain and palpitations.   Gastrointestinal:  Negative for abdominal pain, diarrhea, nausea and vomiting.   Endocrine: Negative for cold intolerance, heat intolerance and polydipsia.   Genitourinary:  Negative for difficulty urinating, dysuria and hematuria.   Musculoskeletal:  Positive for arthralgias and myalgias. Negative for joint swelling.   Skin:  Negative for rash and wound.   Allergic/Immunologic: Negative for environmental allergies.   Neurological:  Negative for dizziness, syncope and numbness.   Hematological:  Does not bruise/bleed easily.   Psychiatric/Behavioral:  Negative for dysphoric mood and sleep disturbance. The patient is not nervous/anxious.          Past  Medical History:   Diagnosis Date    Arthritis     RIGHT HAND    Asthma     Diabetes mellitus     GERD (gastroesophageal reflux disease)     H/O wisdom tooth extraction     Hearing deficit     Hyperlipidemia     Hypertension     Knee sprain     Knee swelling     Migraines     PONV (postoperative nausea and vomiting)     Right hip pain     sched BRAD    Sleep apnea     uses machine    Spinal headache     AFTER SPINAL TAP REQUIRED BLOOD PATCH     Past Surgical History:   Procedure Laterality Date    APPENDECTOMY      BACK SURGERY      X 2    BACK SURGERY  12/07/2020    CATARACT EXTRACTION      RIGHT EYE CATARACT REMOVED    JOINT REPLACEMENT      KNEE SURGERY Left 2011    LAPAROSCOPIC CHOLECYSTECTOMY      SHOULDER ARTHROSCOPY W/ ROTATOR CUFF REPAIR Left 4/12/2024    Procedure: SHOULDER ARTHROSCOPY WITH ROTATOR CUFF REPAIR, loose body removal;  Surgeon: Binh Tavarez MD;  Location: [facility] OR;  Service: Orthopedics;  Laterality: Left;    SHOULDER SURGERY Bilateral     X 2 Dr. Houston    THYROIDECTOMY, PARTIAL      TOTAL HIP ARTHROPLASTY Left 6/21/2016    Procedure: TOTAL HIP ARTHROPLASTY;  Surgeon: Sanchez Goel MD;  Location: [facility] OR;  Service:     TOTAL HIP ARTHROPLASTY Right 8/16/2016    Procedure: TOTAL HIP ARTHROPLASTY depuy summit 7fr hemovac drain placement;  Surgeon: Sanchez Goel MD;  Location: [facility] OR;  Service:     WRIST SURGERY Left      Family History   Problem Relation Age of Onset    Lung disease Father     COPD Father     Cancer Sister     Cancer Paternal Aunt     Diabetes Maternal Grandmother     Diabetes Maternal Grandfather     Diabetes Paternal Grandmother     Diabetes Paternal Grandfather          Objective:  There were no vitals filed for this visit.      06/11/24  1023   Weight: 132 kg (291 lb)     Body mass index is 39.47 kg/m².        Ortho Exam   he is alert and oriented x 3.  Again he reports minimal to no pain in the right hip.  He states he is actually doing quite well.  There is no  motor or sensory deficit.  There is no groin pain.    Assessment:        1. Trochanteric bursitis of right hip    2. Muscular atrophy, unspecified site           Plan: Discussed with him again a exercise program.  He is walking on a regular basis that you think helps.  States when he walks on grass.  States he does better when he walks on a soft surface as opposed to concrete surface..  States he was shown exercises when he was doing therapy and I did recommend that he do those exercises in between his and his walking exercises.  He will return to see me as needed for an injection.  Patient was in agreement.  Answered all questions            Work Status:    [facility] query complete.    Orders:  No orders of the defined types were placed in this encounter.      Medications:  No orders of the defined types were placed in this encounter.      Followup:  Return if symptoms worsen or fail to improve.          Dictated utilizing Dragon dictation

## 2024-06-16 ENCOUNTER — HOSPITAL ENCOUNTER (EMERGENCY)
Facility: HOSPITAL | Age: 54
Discharge: HOME | End: 2024-06-16
Attending: STUDENT IN AN ORGANIZED HEALTH CARE EDUCATION/TRAINING PROGRAM | Admitting: STUDENT IN AN ORGANIZED HEALTH CARE EDUCATION/TRAINING PROGRAM
Payer: MEDICARE

## 2024-06-16 VITALS
BODY MASS INDEX: 39.42 KG/M2 | HEIGHT: 72 IN | DIASTOLIC BLOOD PRESSURE: 88 MMHG | SYSTOLIC BLOOD PRESSURE: 142 MMHG | RESPIRATION RATE: 18 BRPM | TEMPERATURE: 97.8 F | HEART RATE: 90 BPM | WEIGHT: 291.01 LBS | OXYGEN SATURATION: 97 %

## 2024-06-16 DIAGNOSIS — R79.89 POSITIVE D DIMER: ICD-10-CM

## 2024-06-16 DIAGNOSIS — M79.89 LEFT LEG SWELLING: Primary | ICD-10-CM

## 2024-06-16 LAB
D DIMER PPP FEU-MCNC: 0.72 MCGFEU/ML (ref 0–0.53)
GLUCOSE BLDC GLUCOMTR-MCNC: 175 MG/DL (ref 70–130)

## 2024-06-16 PROCEDURE — 85379 FIBRIN DEGRADATION QUANT: CPT | Performed by: STUDENT IN AN ORGANIZED HEALTH CARE EDUCATION/TRAINING PROGRAM

## 2024-06-16 PROCEDURE — 82948 REAGENT STRIP/BLOOD GLUCOSE: CPT

## 2024-06-16 PROCEDURE — 96372 THER/PROPH/DIAG INJ SC/IM: CPT

## 2024-06-16 PROCEDURE — 25010000002 ENOXAPARIN PER 10 MG

## 2024-06-16 PROCEDURE — 36415 COLL VENOUS BLD VENIPUNCTURE: CPT

## 2024-06-16 PROCEDURE — 99283 EMERGENCY DEPT VISIT LOW MDM: CPT

## 2024-06-16 RX ORDER — ENOXAPARIN SODIUM 150 MG/ML
INJECTION SUBCUTANEOUS
Status: COMPLETED
Start: 2024-06-16 | End: 2024-06-16

## 2024-06-16 RX ORDER — ENOXAPARIN SODIUM 150 MG/ML
1 INJECTION SUBCUTANEOUS ONCE
Status: COMPLETED | OUTPATIENT
Start: 2024-06-16 | End: 2024-06-16

## 2024-06-16 RX ADMIN — ENOXAPARIN SODIUM 135 MG: 150 INJECTION SUBCUTANEOUS at 23:19

## 2024-06-16 NOTE — Clinical Note
MEGHANA HARISH ARAUJO  [facility] EMERGENCY DEPARTMENT  [address] [place] [address]  Phone: [phone]    Luke Irizarry was seen and treated in our emergency department on 6/16/2024.  He may return to work on 06/18/2024.         Thank you for choosing [facility].    Rj Neal, DO

## 2024-06-17 ENCOUNTER — HOSPITAL ENCOUNTER (OUTPATIENT)
Dept: ULTRASOUND IMAGING | Facility: HOSPITAL | Age: 54
Discharge: HOME | End: 2024-06-17
Admitting: STUDENT IN AN ORGANIZED HEALTH CARE EDUCATION/TRAINING PROGRAM
Payer: MEDICARE

## 2024-06-17 ENCOUNTER — TELEPHONE (OUTPATIENT)
Dept: EMERGENCY DEPT | Facility: HOSPITAL | Age: 54
End: 2024-06-17
Payer: MEDICARE

## 2024-06-17 DIAGNOSIS — M79.89 LEFT LEG SWELLING: ICD-10-CM

## 2024-06-17 DIAGNOSIS — R79.89 POSITIVE D DIMER: ICD-10-CM

## 2024-06-17 PROCEDURE — 93971 EXTREMITY STUDY: CPT

## 2024-06-17 NOTE — DISCHARGE INSTRUCTIONS
Please follow up with your primary care physician in the next 1-3 days. If this is not possible, please return to the ED for re-evaluation.    Please return tomorrow at 12 PM for ultrasound evaluation of your left lower extremity to rule out DVT    It is IMPORTANT to see your DOCTOR OR PRIMARY CARE PROVIDER. Emergency Care may be incomplete without proper follow-up.  Your evaluation in the emergency department is focused on a specific clinical complaint, at a given moment in time.  Symptoms sometimes change or new symptoms might arise after you leave the Emergency Department. It is important that you call your doctor if you become worse in any way, or promptly return to the Emergency Department should any new, or worsening/changing symptom occur.  You are strongly urged to follow-up with your physician to assure complete and thorough care. PLEASE CALL YOUR DOCTORS OFFICE TODAY, INFORM THEM THAT YOU WERE SEEN IN THE EMERGENCY DEPARTMENT, AND THAT YOU NEED TO BE SEEN IMMEDIATELY FOR CLOSE FOLLOW UP.  If you do not have a primary care doctor we encourage you to proactively seek a local physician for close follow up.  Consider local clinics, free or sliding scale clinics, local [facility].  You can always return to the Emergency Department if you are having difficulty coordinating close follow up.  If medications were prescribed you should fill them at your local pharmacy immediately and take only as prescribed.  Bring your new medications to your doctors follow up visit to discuss any changes that would be necessary. RETURN TO THE EMERGENCY DEPARTMENT IMMEDIATELY FOR ANY NEW OR WORSENING SYMPTOMS.    ADDITIONAL DISCHARGE INSTRUCTIONS:     - If you received IV contrast today with a CT or MRI please stay well hydrated for the next 48-72 hours to protect your kidneys.    - If you have been prescribed an antibiotic, taking an over the counter probiotic may help with gastrointestinal side effects and antibiotic  associated diarrhea.    - Return to the emergency department or seek immediate medical care if any of the following occur:           - Symptoms do not improve in 8-12 hours. If this occurs, please return to the emergency department for re-evaluation or your symptoms or repeat abdominal examination         - Symptoms worsen at any time.         - If you are unable to follow up with a medical provider as instructed.         - You develop any worrisome symptoms such as fever, chills, uncontrolled pain, change or worsening of your pain symptoms, intractable vomiting, uncontrolled diarrhea, blood in your stool, dark/tarry stool, new neurological symptoms etc.    If you have been prescribed a narcotic pain medication, please take a stool softener to prevent constipation.     During your ED visit, you may have been given narcotics (such as morphine, dilaudid, percocet, vicodin) or sedatives (such as ativan, versed). These medications can impair your reflexes so, DO NOT DRIVE or USE ANY MACHINERY for at least 6 hours if you have been given these medications.    REMINDER FOR METFORMIN USERS: If you are using metformin (also known as Glucophage) and if you received a CT scan in which you received IV contrast, you must hold your metformin for a total of 72 hrs.  This will prevent any adverse interactions between the two agents.

## 2024-06-17 NOTE — ED PROVIDER NOTES
Subjective   History of Present Illness  54-year-old male presents to the emergency department concern for left lower extremity swelling.  The patient states that he has had some mild to moderate swelling of the leg for years.  Over the last couple of weeks to months this seems to have worsened.  He is concerned because it is more swollen than the right leg and he is concerned he may have a blood clot.  Patient denies injury, numbness, tingling, loss of sensation that is different from his normal baseline.  He does have a history of diabetes with neuropathic type pain.  Patient denies any erythema, decrease in range of motion of the knee, hip or ankle.      Review of Systems   Constitutional:  Negative for activity change, chills, diaphoresis and fever.   HENT: Negative.     Respiratory: Negative.     Cardiovascular: Negative.    Skin: Negative.    Neurological: Negative.    All other systems reviewed and are negative.      Past Medical History:   Diagnosis Date    Arthritis     RIGHT HAND    Asthma     Diabetes mellitus     GERD (gastroesophageal reflux disease)     H/O wisdom tooth extraction     Hearing deficit     Hyperlipidemia     Hypertension     Knee sprain     Knee swelling     Migraines     PONV (postoperative nausea and vomiting)     Right hip pain     sched BRAD    Sleep apnea     uses machine    Spinal headache     AFTER SPINAL TAP REQUIRED BLOOD PATCH       Allergies   Allergen Reactions    Amoxicillin-Pot Clavulanate GI Intolerance, Unknown - High Severity and Other (See Comments)    Adhesive Tape Rash     Other reaction(s): Blisters    Tape Rash, Unknown - High Severity and Other (See Comments)       Past Surgical History:   Procedure Laterality Date    APPENDECTOMY      BACK SURGERY      X 2    BACK SURGERY  12/07/2020    CATARACT EXTRACTION      RIGHT EYE CATARACT REMOVED    JOINT REPLACEMENT      KNEE SURGERY Left 2011    LAPAROSCOPIC CHOLECYSTECTOMY      SHOULDER ARTHROSCOPY W/ ROTATOR CUFF REPAIR  Left 2024    Procedure: SHOULDER ARTHROSCOPY WITH ROTATOR CUFF REPAIR, loose body removal;  Surgeon: Binh Tavarez MD;  Location: [facility] OR;  Service: Orthopedics;  Laterality: Left;    SHOULDER SURGERY Bilateral     X 2 Dr. Houston    THYROIDECTOMY, PARTIAL      TOTAL HIP ARTHROPLASTY Left 2016    Procedure: TOTAL HIP ARTHROPLASTY;  Surgeon: Sanchez Goel MD;  Location:  [facility] OR;  Service:     TOTAL HIP ARTHROPLASTY Right 2016    Procedure: TOTAL HIP ARTHROPLASTY depuy summit 7fr hemovac drain placement;  Surgeon: Sanchez Goel MD;  Location: [facility] OR;  Service:     WRIST SURGERY Left        Family History   Problem Relation Age of Onset    Lung disease Father     COPD Father     Cancer Sister     Cancer Paternal Aunt     Diabetes Maternal Grandmother     Diabetes Maternal Grandfather     Diabetes Paternal Grandmother     Diabetes Paternal Grandfather        Social History     Socioeconomic History    Marital status:    Tobacco Use    Smoking status: Former     Current packs/day: 0.00     Average packs/day: 1 pack/day for 7.0 years (7.0 ttl pk-yrs)     Types: Cigarettes     Start date:      Quit date:      Years since quittin.4     Passive exposure: Past    Smokeless tobacco: Never   Vaping Use    Vaping status: Never Used   Substance and Sexual Activity    Alcohol use: No    Drug use: Never    Sexual activity: Yes     Partners: Female           Objective   Physical Exam  Vitals and nursing note reviewed.   Constitutional:       Appearance: Normal appearance. He is normal weight. He is not ill-appearing, toxic-appearing or diaphoretic.   HENT:      Head: Normocephalic and atraumatic.      Right Ear: External ear normal.      Left Ear: External ear normal.      Nose: Nose normal. No congestion or rhinorrhea.      Mouth/Throat:      Pharynx: No oropharyngeal exudate or posterior oropharyngeal erythema.   Eyes:      Extraocular Movements: Extraocular movements intact.       Conjunctiva/sclera: Conjunctivae normal.      Pupils: Pupils are equal, round, and reactive to light.   Cardiovascular:      Rate and Rhythm: Normal rate and regular rhythm.      Pulses: Normal pulses.   Pulmonary:      Effort: Pulmonary effort is normal.      Breath sounds: Normal breath sounds. No stridor. No wheezing, rhonchi or rales.   Chest:      Chest wall: No tenderness.   Abdominal:      General: There is no distension.      Palpations: Abdomen is soft. There is no mass.   Musculoskeletal:         General: No swelling, tenderness or signs of injury. Normal range of motion.      Cervical back: Normal range of motion. No rigidity or tenderness.      Comments: On evaluation of the left lower extremity there is some mild to moderate swelling of the left leg without erythema or warmth.  This is worse in the right leg.  The patient does not have any evidence of erythema, cellulitis or joint abnormality such as decreased range of motion of the joint suggestive of a septic joint.  No evidence of Baker's cyst on palpation.   Skin:     General: Skin is warm.      Capillary Refill: Capillary refill takes less than 2 seconds.      Coloration: Skin is not jaundiced or pale.      Findings: No bruising.   Neurological:      General: No focal deficit present.      Mental Status: He is alert and oriented to person, place, and time. Mental status is at baseline.      Motor: No weakness.         Procedures           ED Course                                             Medical Decision Making    MDM:    Escalation of care including admission/observation considered    - Discussions of management with other providers:  None    - Discussed/reviewed with Radiology regarding test interpretation    - Independent interpretation: Labs    - Additional patient history obtained from: None    - Review of external non-ED record (if available):  Prior Inpt record, Office record, Outpt record, Prior Outpt labs, PCP record, Outside ED  record, Other    - Chronic conditions affecting care: See HPI and medical Hx.    - Social Determinants of health significantly affecting care:  None        Medical Decision Making Discussion:    This is a 54-year-old who presents to the emergency department with left lower extremity swelling this been ongoing for the last couple of years but worsened over the last couple of weeks.  He is concerned he has a DVT.  D-dimer is elevated here and even with age adjustment does not meet criteria for rule out.  Therefore the patient will be given a dose of Lovenox here in the setting of possibility of DVT.  He also will receive an ultrasound tomorrow, the order has been placed and we have confirmed that he has an appointment tomorrow at 12 noon.  The patient will return here to the emergency department/hospital for ultrasound evaluation/venous Doppler.  No other complaints or injury.  I see no evidence of acute traumatic injury of the left lower extremity no evidence of infection.  He is otherwise well-appearing, sensation intact.  Neurovascularly he is within normal limits and he is stable at this time for discharge.    The patient has been given very strict return precautions to return to the emergency department should there be any acute change or worsening of their condition.  I have explained my findings and the patient has expressed understanding to me.  I explained that the work-up performed in the ED has been based on the specific complaint and concern, as the nature of emergency medicine is complaint driven and they understand that new symptoms may arise.  I have told them that, should there be any new symptoms, worsening or changing symptoms, a new work-up may be indicated that they are encouraged to return to the emergency department or promptly contact their primary care physician. We have employed a shared decision-making process as the discussion of their disposition.  The patient has been educated as to the  nature of the visit, the tests and work-up performed and the findings from today's visit. At this time, there does not appear to be any acute emergent process that necessitates admission to the hospital, however, the patient understands that this can change unexpectedly. At this time, the patient is stable for discharge home and agrees to follow-up with her primary care physician in the next 24 to 48 hours or earlier should they be able to obtain an appointment.    The patient was counseled regarding diagnostic results and treatment plan and patient has indicated understanding of these instructions.      Problems Addressed:  Left leg swelling: complicated acute illness or injury  Positive D dimer: complicated acute illness or injury    Amount and/or Complexity of Data Reviewed  Labs: ordered.    Risk  Prescription drug management.        Final diagnoses:   Left leg swelling   Positive D dimer       ED Disposition  ED Disposition       ED Disposition   Discharge    Condition   Good    Comment   --               Jhon Morrell MD  [address] [place] [id number]  [phone]               Medication List      No changes were made to your prescriptions during this visit.            Rj Neal,   06/16/24 [id number]

## 2024-07-15 ENCOUNTER — OFFICE VISIT (OUTPATIENT)
Dept: ORTHOPEDIC SURGERY | Facility: CLINIC | Age: 54
End: 2024-07-15
Payer: MEDICARE

## 2024-07-15 VITALS — BODY MASS INDEX: 39.42 KG/M2 | WEIGHT: 291 LBS | HEIGHT: 72 IN

## 2024-07-15 DIAGNOSIS — Z98.890 STATUS POST ARTHROSCOPY OF SHOULDER: Primary | ICD-10-CM

## 2024-07-15 PROCEDURE — 99213 OFFICE O/P EST LOW 20 MIN: CPT | Performed by: ORTHOPAEDIC SURGERY

## 2024-07-15 RX ORDER — HYDROCODONE BITARTRATE AND ACETAMINOPHEN 5; 325 MG/1; MG/1
TABLET ORAL
COMMUNITY

## 2024-07-15 RX ORDER — CELECOXIB 200 MG/1
200 CAPSULE ORAL 2 TIMES DAILY
Qty: 60 CAPSULE | Refills: 0 | Status: SHIPPED | OUTPATIENT
Start: 2024-07-15

## 2024-07-15 NOTE — PROGRESS NOTES
Subjective:     Patient ID: Luke Irizarry II is a 54 y.o. male.    Chief Complaint:  Follow-up status post left shoulder rotator cuff repair, subacromial decompression-2024    History of Present Illness  History of Present Illness  The patient returns to clinic today for follow-up evaluation in regards to his left shoulder.    The patient reports overall well-being, however, he has recently begun to experience intermittent, increased levels of pain, which he rates as 3 to 4 out of 10, localized to the lateral aspect of his shoulder. Over the past few weeks, he has noticed an increase in shoulder activity. He also reports nocturnal pain, which does not disrupt his sleep, and describes it as achy in nature. He denies any associated numbness or tingling, and notes that the pain is primarily localized to the lateral aspect of the shoulder. He denies any radiation of pain down into his arm. Despite minimal improvement with diclofenac, he continues to experience discomfort.     Social History     Occupational History    Not on file   Tobacco Use    Smoking status: Former     Current packs/day: 0.00     Average packs/day: 1 pack/day for 7.0 years (7.0 ttl pk-yrs)     Types: Cigarettes     Start date:      Quit date:      Years since quittin.5     Passive exposure: Past    Smokeless tobacco: Never   Vaping Use    Vaping status: Never Used   Substance and Sexual Activity    Alcohol use: No    Drug use: Never    Sexual activity: Yes     Partners: Female      Past Medical History:   Diagnosis Date    Arthritis     RIGHT HAND    Asthma     Diabetes mellitus     GERD (gastroesophageal reflux disease)     H/O wisdom tooth extraction     Hearing deficit     Hyperlipidemia     Hypertension     Knee sprain     Knee swelling     Migraines     PONV (postoperative nausea and vomiting)     Right hip pain     sched BRAD    Sleep apnea     uses machine    Spinal headache     AFTER SPINAL TAP REQUIRED BLOOD PATCH     Past  "Surgical History:   Procedure Laterality Date    APPENDECTOMY      BACK SURGERY      X 2    BACK SURGERY  12/07/2020    CATARACT EXTRACTION      RIGHT EYE CATARACT REMOVED    JOINT REPLACEMENT      KNEE SURGERY Left 2011    LAPAROSCOPIC CHOLECYSTECTOMY      SHOULDER ARTHROSCOPY W/ ROTATOR CUFF REPAIR Left 4/12/2024    Procedure: SHOULDER ARTHROSCOPY WITH ROTATOR CUFF REPAIR, loose body removal;  Surgeon: Binh Tavarez MD;  Location: [facility] OR;  Service: Orthopedics;  Laterality: Left;    SHOULDER SURGERY Bilateral     X 2 Dr. Houston    THYROIDECTOMY, PARTIAL      TOTAL HIP ARTHROPLASTY Left 6/21/2016    Procedure: TOTAL HIP ARTHROPLASTY;  Surgeon: Sanchez Goel MD;  Location:  [facility] OR;  Service:     TOTAL HIP ARTHROPLASTY Right 8/16/2016    Procedure: TOTAL HIP ARTHROPLASTY depuy summit 7fr hemovac drain placement;  Surgeon: Sanchez Goel MD;  Location: [facility] OR;  Service:     WRIST SURGERY Left        Family History   Problem Relation Age of Onset    Lung disease Father     COPD Father     Cancer Sister     Cancer Paternal Aunt     Diabetes Maternal Grandmother     Diabetes Maternal Grandfather     Diabetes Paternal Grandmother     Diabetes Paternal Grandfather          Review of Systems        Objective:  Vitals:    07/15/24 1011   Weight: 132 kg (291 lb)   Height: 182.9 cm (72\")         07/15/24  1011   Weight: 132 kg (291 lb)     Body mass index is 39.47 kg/m².  General: No acute distress.  Resp: normal respiratory effort  Skin: no rashes or wounds; normal turgor  Psych: mood and affect appropriate; recent and remote memory intact          Physical Exam  Left shoulder-  Active forward flexion in the left shoulder is 175 degrees, 4+ out of 5 strength. Active external rotation is 50 degrees, 4 plus out of 5 strength. Internal rotation is T10. Strength is 5 out of 5 on belly press test. Negative empty can test. Positive Ramirez and Neer's. Negative drop arm test. Negative external rotation lag sign. Portal " sites are well healed. Positive deltoid firing all 3 components.         Imaging:  None today  Assessment:        1. s/p left shoulder arthroscopic rotator cuff repair of supraspinatus, debridement of glenoid humeral head, labrum, subacromial bursa, rotator cuff subscapularis removal loose body DOS 4/12/2024           Plan:          Assessment & Plan  1. Left shoulder pain.  A comprehensive discussion was held with the patient regarding various treatment options. Currently, he is experiencing some irritation in his shoulder, which has not shown improvement with diclofenac. Given the persistence of symptoms and failure of other treatments, we have decided to proceed with Celebrex, a treatment that has previously yielded positive results. It is advised that he continues with home exercises, focusing on stretching, strengthening, range of motion, and limiting function based on pain. Should there be a recurrence of symptoms or persistence that is unrelieved with medication, a follow-up appointment will be scheduled. If necessary, injections could be considered at that point. The patient concurs with this plan and all queries were addressed.    Luke Irizarry II was in agreement with plan and had all questions answered.     Orders:  No orders of the defined types were placed in this encounter.      Medications:  New Medications Ordered This Visit   Medications    celecoxib (CeleBREX) 200 MG capsule     Sig: Take 1 capsule by mouth 2 (Two) Times a Day.     Dispense:  60 capsule     Refill:  0       Followup:  No follow-ups on file.    Diagnoses and all orders for this visit:    1. s/p left shoulder arthroscopic rotator cuff repair of supraspinatus, debridement of glenoid humeral head, labrum, subacromial bursa, rotator cuff subscapularis removal loose body DOS 4/12/2024 (Primary)    Other orders  -     celecoxib (CeleBREX) 200 MG capsule; Take 1 capsule by mouth 2 (Two) Times a Day.  Dispense: 60 capsule; Refill:  0          Dictated utilizing Dragon dictation     Patient or patient representative verbalized consent for the use of Ambient Listening during the visit with  Binh Tavarez MD for chart documentation. 7/15/2024  10:34 EDT

## 2024-07-31 ENCOUNTER — OFFICE VISIT (OUTPATIENT)
Dept: ORTHOPEDIC SURGERY | Facility: CLINIC | Age: 54
End: 2024-07-31
Payer: MEDICARE

## 2024-07-31 VITALS — WEIGHT: 291 LBS | HEIGHT: 72 IN | BODY MASS INDEX: 39.42 KG/M2

## 2024-07-31 DIAGNOSIS — Z98.890 STATUS POST ARTHROSCOPY OF SHOULDER: Primary | ICD-10-CM

## 2024-07-31 RX ORDER — LIDOCAINE HYDROCHLORIDE 10 MG/ML
4 INJECTION, SOLUTION EPIDURAL; INFILTRATION; INTRACAUDAL; PERINEURAL
Status: COMPLETED | OUTPATIENT
Start: 2024-07-31 | End: 2024-07-31

## 2024-07-31 RX ORDER — TRIAMCINOLONE ACETONIDE 40 MG/ML
80 INJECTION, SUSPENSION INTRA-ARTICULAR; INTRAMUSCULAR
Status: COMPLETED | OUTPATIENT
Start: 2024-07-31 | End: 2024-07-31

## 2024-07-31 RX ADMIN — TRIAMCINOLONE ACETONIDE 80 MG: 40 INJECTION, SUSPENSION INTRA-ARTICULAR; INTRAMUSCULAR at 15:40

## 2024-07-31 RX ADMIN — LIDOCAINE HYDROCHLORIDE 4 ML: 10 INJECTION, SOLUTION EPIDURAL; INFILTRATION; INTRACAUDAL; PERINEURAL at 15:40

## 2024-07-31 NOTE — PROGRESS NOTES
Subjective:     Patient ID: Luke Irizarry II is a 54 y.o. male.    Chief Complaint:  Follow-up status post left shoulder rotator cuff repair, subacromial decompression-2024     History of Present Illness  History of Present Illness  The patient returns to clinic today for follow-up evaluation in regards to his left shoulder.    He reports satisfactory mobility and strength in his shoulder, despite being discharged from therapy. However, he continues to experience pain, particularly at night. The pain intensifies with resisted activities, which he describes as located over the anterolateral aspect of his left shoulder, radiating up to the acromioclavicular joint, and extending into the paraspinal region. He denies any associated numbness, tingling, fever, chills, or sweats. His incision site shows no issues. He notes minimal improvement with home exercise, range of motion, and physical therapy.     Social History     Occupational History    Not on file   Tobacco Use    Smoking status: Former     Current packs/day: 0.00     Average packs/day: 1 pack/day for 7.0 years (7.0 ttl pk-yrs)     Types: Cigarettes     Start date:      Quit date:      Years since quittin.5     Passive exposure: Past    Smokeless tobacco: Never   Vaping Use    Vaping status: Never Used   Substance and Sexual Activity    Alcohol use: No    Drug use: Never    Sexual activity: Yes     Partners: Female      Past Medical History:   Diagnosis Date    Arthritis     RIGHT HAND    Asthma     Diabetes mellitus     GERD (gastroesophageal reflux disease)     H/O wisdom tooth extraction     Hearing deficit     Hyperlipidemia     Hypertension     Knee sprain     Knee swelling     Migraines     PONV (postoperative nausea and vomiting)     Right hip pain     sched BRAD    Sleep apnea     uses machine    Spinal headache     AFTER SPINAL TAP REQUIRED BLOOD PATCH     Past Surgical History:   Procedure Laterality Date    APPENDECTOMY      BACK  "SURGERY      X 2    BACK SURGERY  12/07/2020    CATARACT EXTRACTION      RIGHT EYE CATARACT REMOVED    JOINT REPLACEMENT      KNEE SURGERY Left 2011    LAPAROSCOPIC CHOLECYSTECTOMY      SHOULDER ARTHROSCOPY W/ ROTATOR CUFF REPAIR Left 4/12/2024    Procedure: SHOULDER ARTHROSCOPY WITH ROTATOR CUFF REPAIR, loose body removal;  Surgeon: Binh Tavarez MD;  Location:  [facility] OR;  Service: Orthopedics;  Laterality: Left;    SHOULDER SURGERY Bilateral     X 2 Dr. Houston    THYROIDECTOMY, PARTIAL      TOTAL HIP ARTHROPLASTY Left 6/21/2016    Procedure: TOTAL HIP ARTHROPLASTY;  Surgeon: Sanchez Goel MD;  Location:  [facility] OR;  Service:     TOTAL HIP ARTHROPLASTY Right 8/16/2016    Procedure: TOTAL HIP ARTHROPLASTY depuy summit 7fr hemovac drain placement;  Surgeon: Sanchez Goel MD;  Location:  [facility] OR;  Service:     WRIST SURGERY Left        Family History   Problem Relation Age of Onset    Lung disease Father     COPD Father     Cancer Sister     Cancer Paternal Aunt     Diabetes Maternal Grandmother     Diabetes Maternal Grandfather     Diabetes Paternal Grandmother     Diabetes Paternal Grandfather          Review of Systems        Objective:  Vitals:    07/31/24 1504   Weight: 132 kg (291 lb)   Height: 182.9 cm (72\")         07/31/24  1504   Weight: 132 kg (291 lb)     Body mass index is 39.47 kg/m².  General: No acute distress.  Resp: normal respiratory effort  Skin: no rashes or wounds; normal turgor  Psych: mood and affect appropriate; recent and remote memory intact          Physical Exam  Left shoulder exhibits active forward flexion of 170 degrees, 4 plus out of 5 strength, external rotation 55 degrees, 4+ out of 5 strength, internal rotation T10, 5 out of 5 strength in belly press test, negative bear hug sign, positive Ramirez and Neer's, minimally positive empty can test, negative drop arm test, negative external rotation lag sign, mild tenderness over AC joint, mildly positive crossarm test, brisk cap " refill to all digits, 2+ radial pulse in left wrist.         Imaging:  Left Shoulder X-Ray  Indication: Pain  AP, scapular Y, and axillary lateral views    Findings:  No fracture  No bony lesion  Retained portion of the rotator cuff implant noted to be in the posterior soft tissues  Compared to preop x-rays      Assessment:        1. s/p left shoulder arthroscopic rotator cuff repair of supraspinatus, debridement of glenoid humeral head, labrum, subacromial bursa, rotator cuff subscapularis removal loose body DOS 4/12/2024           Plan:      Large Joint Arthrocentesis: L subacromial bursa  Date/Time: 7/31/2024 3:40 PM  Consent given by: patient  Site marked: site marked  Timeout: Immediately prior to procedure a time out was called to verify the correct patient, procedure, equipment, support staff and site/side marked as required   Supporting Documentation  Indications: pain   Procedure Details  Location: shoulder - L subacromial bursa  Preparation: Patient was prepped and draped in the usual sterile fashion  Needle size: 22 G  Approach: lateral  Medications administered: 4 mL lidocaine PF 1% 1 %; 80 mg triamcinolone acetonide 40 MG/ML  Patient tolerance: patient tolerated the procedure well with no immediate complications          Assessment & Plan  1. Left shoulder pain.  The patient's pain appears to be primarily originating from the subacromial region. He denies any reinjury and has been lifting with the left arm since his discharge from therapy. All his queries were addressed. We discussed options at length with the patient. An injection was discussed, which he agreed to proceed with today. A left shoulder subacromial injection was administered today, with a recommendation for home exercises to continue to work on range of motion and strength as tolerated. If his relief is incomplete or pain significantly recurs significantly, advanced imaging with MR arthrogram may be considered to evaluate for a recurrent  rotator cuff tear.      Patient would like to proceed with cortisone injection today to the left shoulder subacromial bursa. Recommended limited use of affected extremity for the next 24 hours to only essential activites other than work on general active and passive motion. Recommended supplementing with ice and soft tissue massage. Discussed with patient that they should see results in 5-7 days, if no improvement in 5-6 weeks I have asked them to call the office to review other options. Patient should call office immediately if they notice redness, warmth, fevers, chills, or residual numbness or tingling for greater than 6 hours after injection.     Follow-up  The patient will follow up in 6 weeks.    Luke Irizarry II was in agreement with plan and had all questions answered.     Orders:  Orders Placed This Encounter   Procedures    Large Joint Arthrocentesis: L subacromial bursa    XR Shoulder 2+ View Left       Medications:  No orders of the defined types were placed in this encounter.      Followup:  Return in about 6 weeks (around 9/11/2024).    Diagnoses and all orders for this visit:    1. s/p left shoulder arthroscopic rotator cuff repair of supraspinatus, debridement of glenoid humeral head, labrum, subacromial bursa, rotator cuff subscapularis removal loose body DOS 4/12/2024 (Primary)  -     XR Shoulder 2+ View Left    Other orders  -     Large Joint Arthrocentesis: L subacromial bursa          Dictated utilizing Dragon dictation     Patient or patient representative verbalized consent for the use of Ambient Listening during the visit with  Binh Tavarez MD for chart documentation. 7/31/2024  15:26 EDT

## 2025-03-28 NOTE — PROGRESS NOTES
Fetal Heart Tracing Interpretation        Variability: Moderate (6-25 beats/min)  Accels: 15 x 15 accelerations are present.  Decels: NONE  Contractions on Minerva:  Minerva notes occasional uterine contractions.        Interpretation:  Category 1 - Moderate variability or accelerations and absent decelerations    NST:reactive   Subjective   Luke Irizarry II is a 52 y.o. male who presents for annual male wellness exam.  Chief Complaint   Patient presents with   • Diabetes   • Foot Swelling     Ball of foot swelling on left side, and left leg swelling     • Annual Exam        Patient is here today for complete physical.  He also has a new complaint of swelling on his left foot and leg that had been just coming and going.  States lately it is doing.  At night time gets worse.  Is back to normal by next morning.  Foot swelling seems to be there all the time.    Reports that he does have pain into that leg, but thinks it is related to back.  Will feel a burning pain at times.  They want to do another surgery on back  Had US in 2022 that was negative, but has worsened since then.   Gets cramps in legs at night and feels like they are so bad his legs will go from under him    Diabetes-on tradjenta and metformin-  a1c is 6.3 today. Last visit we started on tradjenta.  Working well. Denies any side effects    Hyperlipidemia-on atorvastatin     Breathing-feels like it is doing better.  Has appt for testing today    Allergies-flonase as needed.     Hypertension-reports he hasn't been taking it for last 2 1/2 weeks.   On lisinopril/hctz.  Reports he was just taking hctz    Chronic pain management- sees them    gerd- protonix working well for him    Sexual History: 1 female partner  Contraception: n/a  Diet: not the healthiest. Drinks mostly water or diet mountain dew  Exercise: none  Do you feel safe? Reports he does  Have you ever been abused? denies  Last dental exam: twice yearly  Eye exam: 2 months ago    Colon Cancer Screenin2021  PSA: never      Immunization History   Administered Date(s) Administered   • Flu Vaccine Split Quad 2017, 10/22/2017   • Influenza, Unspecified 10/23/2017   • TD Preservative Free 2020   • Tdap 2013, 2016       The following portions of the patient's history were reviewed and  updated as appropriate: allergies, current medications, past family history, past medical history, past social history, past surgical history and problem list.    Past Medical History:   Diagnosis Date   • Arthritis     RIGHT HAND   • Diabetes mellitus (HCC)    • GERD (gastroesophageal reflux disease)    • H/O wisdom tooth extraction    • Hearing deficit    • Hypertension    • Knee sprain    • Knee swelling    • Migraines    • Right hip pain     sched BRAD   • Sleep apnea     no machine, states needs to have new study   • Spinal headache     AFTER SPINAL TAP REQUIRED BLOOD PATCH       Past Surgical History:   Procedure Laterality Date   • APPENDECTOMY     • BACK SURGERY      X 2   • BACK SURGERY  2020   • CATARACT EXTRACTION      RIGHT EYE CATARACT REMOVED   • JOINT REPLACEMENT     • KNEE SURGERY Left    • LAPAROSCOPIC CHOLECYSTECTOMY     • SHOULDER SURGERY Bilateral     X 2 Dr. Houston   • THYROIDECTOMY, PARTIAL     • TOTAL HIP ARTHROPLASTY Left 2016    Procedure: TOTAL HIP ARTHROPLASTY;  Surgeon: Sanchez Goel MD;  Location: [facility] OR;  Service:    • TOTAL HIP ARTHROPLASTY Right 2016    Procedure: TOTAL HIP ARTHROPLASTY depuy summit 7fr hemovac drain placement;  Surgeon: Sanchez Goel MD;  Location: [facility] OR;  Service:    • WRIST SURGERY Left        Family History   Problem Relation Age of Onset   • Lung disease Father    • COPD Father    • Cancer Sister    • Cancer Paternal Aunt    • Diabetes Maternal Grandmother    • Diabetes Maternal Grandfather    • Diabetes Paternal Grandmother    • Diabetes Paternal Grandfather        Social History     Socioeconomic History   • Marital status:    Tobacco Use   • Smoking status: Former Smoker     Packs/day: 1.00     Years: 7.00     Pack years: 7.00     Types: Cigarettes     Quit date:      Years since quittin.3   • Smokeless tobacco: Never Used   Vaping Use   • Vaping Use: Never used   Substance and Sexual Activity   • Alcohol use: No   •  Drug use: Never   • Sexual activity: Yes     Partners: Female       Review of Systems   Constitutional: Negative for fatigue and fever.   Respiratory: Negative for cough, shortness of breath and wheezing.    Cardiovascular: Positive for leg swelling (left lower extremity). Negative for chest pain.   Gastrointestinal: Negative for abdominal pain, constipation, diarrhea, nausea and vomiting.   Genitourinary: Negative for dysuria and urgency.   Musculoskeletal: Positive for back pain.   Skin: Negative.    Neurological: Negative for dizziness and headache.   Psychiatric/Behavioral: Negative for suicidal ideas and depressed mood. The patient is not nervous/anxious.        Objective   Vitals:    22 0900   BP: 136/84   Pulse: 63   Temp: 96.9 °F (36.1 °C)   SpO2: 97%     Body mass index is 40.42 kg/m².  Physical Exam  Constitutional:       Appearance: Normal appearance.   Cardiovascular:      Rate and Rhythm: Normal rate and regular rhythm.      Pulses: Normal pulses.   Pulmonary:      Effort: Pulmonary effort is normal.      Breath sounds: Normal breath sounds.   Musculoskeletal:      Right lower leg: Edema (non pitting) present.      Left lower le+ Edema present.   Skin:     General: Skin is warm and dry.   Neurological:      Mental Status: He is alert and oriented to person, place, and time.   Psychiatric:         Mood and Affect: Mood normal.         Behavior: Behavior normal.         Thought Content: Thought content normal.         Judgment: Judgment normal.           Assessment/Plan   Diagnoses and all orders for this visit:    1. Type 2 diabetes mellitus with hyperglycemia, without long-term current use of insulin (HCC) (Primary)  -     POC Glycosylated Hemoglobin (Hb A1C)  -     POC Microalbumin  -     CBC & Differential  -     Comprehensive Metabolic Panel  -     Lipid Panel  -     TSH  -     BNP ([facility] Only)  -     Magnesium  -     Vitamin B12  -     Vitamin D 25 Hydroxy    2. Encounter for general  adult medical examination with abnormal findings  -     CBC & Differential  -     Comprehensive Metabolic Panel  -     Lipid Panel  -     TSH  -     BNP ([facility] Only)  -     Magnesium  -     Vitamin B12  -     Vitamin D 25 Hydroxy    3. Allergic rhinitis, unspecified seasonality, unspecified trigger  -     CBC & Differential  -     Comprehensive Metabolic Panel  -     Lipid Panel  -     TSH  -     BNP ([facility] Only)  -     Magnesium  -     Vitamin B12  -     Vitamin D 25 Hydroxy    4. Benign essential hypertension  -     CBC & Differential  -     Comprehensive Metabolic Panel  -     Lipid Panel  -     TSH  -     BNP ([facility] Only)  -     Magnesium  -     Vitamin B12  -     Vitamin D 25 Hydroxy    5. Gastroesophageal reflux disease without esophagitis  -     CBC & Differential  -     Comprehensive Metabolic Panel  -     Lipid Panel  -     TSH  -     BNP ([facility] Only)  -     Magnesium  -     Vitamin B12  -     Vitamin D 25 Hydroxy    6. Herniation of lumbar intervertebral disc with radiculopathy  -     CBC & Differential  -     Comprehensive Metabolic Panel  -     Lipid Panel  -     TSH  -     BNP ([facility] Only)  -     Magnesium  -     Vitamin B12  -     Vitamin D 25 Hydroxy    7. Class 3 obesity with alveolar hypoventilation, serious comorbidity, and body mass index (BMI) of 40.0 to 44.9 in adult (HCC)    8. Screening for prostate cancer  -     PSA SCREENING    9. Pain and swelling of left lower extremity  -     US Venous Doppler Lower Extremity Left (duplex); Future    Other orders  -     pantoprazole (PROTONIX) 40 MG EC tablet; Take 1 tablet by mouth Daily.  Dispense: 90 tablet; Refill: 3  -     metFORMIN (GLUCOPHAGE) 500 MG tablet; Take 1 tablet by mouth 2 (Two) Times a Day With Meals.  Dispense: 180 tablet; Refill: 2  -     lisinopril-hydrochlorothiazide (PRINZIDE,ZESTORETIC) 20-25 MG per tablet; Take 1 tablet by mouth Daily.  Dispense: 90 tablet; Refill: 0  -     linagliptin (Tradjenta) 5 MG tablet tablet;  Take 1 tablet by mouth Daily.  Dispense: 90 tablet; Refill: 2  -     atorvastatin (LIPITOR) 20 MG tablet; Take 1 tablet by mouth Daily.  Dispense: 90 tablet; Refill: 2      Healthy adult physical.  Discussed the need for weight loss to healthy BMI between 18 and 25.  This can be achieved with reduction of calories and carbs in diet and inclusion of exercise.    Diabetes-on tradjenta and metformin-continues demonstrating good control.  Refill sent in    Hyperlipidemia-on atorvastatin will check lipid and liver function test today.  Continue.    Breathing-feels like it is doing better.  Has appt for testing today.  Continue pulmonology    Allergies-flonase as needed.  Continue    Hypertension-reports he hasn't been taking it for last 2 1/2 weeks.   On lisinopril/hctz.  Reports he was just taking hctz restart medication.  Discussed this could be causing increased swelling as well as chronic back pain.    Chronic pain management- sees them.  Continue to discuss possible need for surgery since symptoms are worsening.    gerd- protonix working well for him.  Continue    Will call with results of labs any changes needed plan of care.  Follow-up in 3 months, sooner if needed.  Will obtain ultrasound to rule out blood clot.  Also check electrolytes for pain in legs.    Offered vaccines.  Patient declines vaccines.         Discussed the importance of maintaining a healthy weight and getting regular exercise.  Educated patient on the benefits of healthy diet.  Advise follow-up annually for wellness exams.

## 2025-08-28 ENCOUNTER — HOSPITAL ENCOUNTER (EMERGENCY)
Facility: HOSPITAL | Age: 55
Discharge: HOME | End: 2025-08-28
Attending: EMERGENCY MEDICINE
Payer: MEDICARE

## 2025-08-28 ENCOUNTER — APPOINTMENT (OUTPATIENT)
Dept: CT IMAGING | Facility: HOSPITAL | Age: 55
End: 2025-08-28
Payer: MEDICARE

## 2025-08-28 VITALS
BODY MASS INDEX: 37.38 KG/M2 | DIASTOLIC BLOOD PRESSURE: 91 MMHG | SYSTOLIC BLOOD PRESSURE: 154 MMHG | WEIGHT: 276 LBS | RESPIRATION RATE: 18 BRPM | TEMPERATURE: 98.3 F | OXYGEN SATURATION: 97 % | HEIGHT: 72 IN | HEART RATE: 62 BPM

## 2025-08-28 DIAGNOSIS — R10.9 ABDOMINAL PAIN, UNSPECIFIED ABDOMINAL LOCATION: Primary | ICD-10-CM

## 2025-08-28 LAB
ALBUMIN SERPL-MCNC: 4.3 G/DL (ref 3.5–5.2)
ALBUMIN/GLOB SERPL: 1.5 G/DL
ALP SERPL-CCNC: 101 U/L (ref 39–117)
ALT SERPL W P-5'-P-CCNC: 37 U/L (ref 1–41)
ANION GAP SERPL CALCULATED.3IONS-SCNC: 12.6 MMOL/L (ref 5–15)
AST SERPL-CCNC: 30 U/L (ref 1–40)
BASOPHILS # BLD AUTO: 0.06 10*3/MM3 (ref 0–0.2)
BASOPHILS NFR BLD AUTO: 0.8 % (ref 0–1.5)
BILIRUB SERPL-MCNC: 0.4 MG/DL (ref 0–1.2)
BILIRUB UR QL STRIP: NEGATIVE
BUN SERPL-MCNC: 12.4 MG/DL (ref 6–20)
BUN/CREAT SERPL: 13.3 (ref 7–25)
CALCIUM SPEC-SCNC: 8.8 MG/DL (ref 8.6–10.5)
CHLORIDE SERPL-SCNC: 106 MMOL/L (ref 98–107)
CLARITY UR: CLEAR
CO2 SERPL-SCNC: 21.4 MMOL/L (ref 22–29)
COLOR UR: YELLOW
CREAT SERPL-MCNC: 0.93 MG/DL (ref 0.76–1.27)
DEPRECATED RDW RBC AUTO: 41.1 FL (ref 37–54)
EGFRCR SERPLBLD CKD-EPI 2021: 97.6 ML/MIN/1.73
EOSINOPHIL # BLD AUTO: 0.35 10*3/MM3 (ref 0–0.4)
EOSINOPHIL NFR BLD AUTO: 4.6 % (ref 0.3–6.2)
ERYTHROCYTE [DISTWIDTH] IN BLOOD BY AUTOMATED COUNT: 13.2 % (ref 12.3–15.4)
GLOBULIN UR ELPH-MCNC: 2.9 GM/DL
GLUCOSE SERPL-MCNC: 117 MG/DL (ref 65–99)
GLUCOSE UR STRIP-MCNC: NEGATIVE MG/DL
HCT VFR BLD AUTO: 42 % (ref 37.5–51)
HGB BLD-MCNC: 13.8 G/DL (ref 13–17.7)
HGB UR QL STRIP.AUTO: NEGATIVE
IMM GRANULOCYTES # BLD AUTO: 0 10*3/MM3 (ref 0–0.05)
IMM GRANULOCYTES NFR BLD AUTO: 0 % (ref 0–0.5)
KETONES UR QL STRIP: NEGATIVE
LEUKOCYTE ESTERASE UR QL STRIP.AUTO: NEGATIVE
LIPASE SERPL-CCNC: 33 U/L (ref 13–60)
LYMPHOCYTES # BLD AUTO: 2.99 10*3/MM3 (ref 0.7–3.1)
LYMPHOCYTES NFR BLD AUTO: 39.3 % (ref 19.6–45.3)
MCH RBC QN AUTO: 28 PG (ref 26.6–33)
MCHC RBC AUTO-ENTMCNC: 32.9 G/DL (ref 31.5–35.7)
MCV RBC AUTO: 85.4 FL (ref 79–97)
MONOCYTES # BLD AUTO: 0.57 10*3/MM3 (ref 0.1–0.9)
MONOCYTES NFR BLD AUTO: 7.5 % (ref 5–12)
NEUTROPHILS NFR BLD AUTO: 3.64 10*3/MM3 (ref 1.7–7)
NEUTROPHILS NFR BLD AUTO: 47.8 % (ref 42.7–76)
NITRITE UR QL STRIP: NEGATIVE
PH UR STRIP.AUTO: 6 [PH] (ref 4.5–8)
PLATELET # BLD AUTO: 257 10*3/MM3 (ref 140–450)
PMV BLD AUTO: 11.2 FL (ref 6–12)
POTASSIUM SERPL-SCNC: 3.8 MMOL/L (ref 3.5–5.2)
PROT SERPL-MCNC: 7.2 G/DL (ref 6–8.5)
PROT UR QL STRIP: NEGATIVE
RBC # BLD AUTO: 4.92 10*6/MM3 (ref 4.14–5.8)
SODIUM SERPL-SCNC: 140 MMOL/L (ref 136–145)
SP GR UR STRIP: 1.02 (ref 1–1.03)
UROBILINOGEN UR QL STRIP: NORMAL
WBC NRBC COR # BLD AUTO: 7.61 10*3/MM3 (ref 3.4–10.8)

## 2025-08-28 PROCEDURE — 74176 CT ABD & PELVIS W/O CONTRAST: CPT

## 2025-08-28 PROCEDURE — 85025 COMPLETE CBC W/AUTO DIFF WBC: CPT | Performed by: EMERGENCY MEDICINE

## 2025-08-28 PROCEDURE — 80053 COMPREHEN METABOLIC PANEL: CPT | Performed by: EMERGENCY MEDICINE

## 2025-08-28 PROCEDURE — 99284 EMERGENCY DEPT VISIT MOD MDM: CPT | Performed by: EMERGENCY MEDICINE

## 2025-08-28 PROCEDURE — 81003 URINALYSIS AUTO W/O SCOPE: CPT | Performed by: EMERGENCY MEDICINE

## 2025-08-28 PROCEDURE — 83690 ASSAY OF LIPASE: CPT | Performed by: EMERGENCY MEDICINE

## 2025-08-28 RX ORDER — KETOROLAC TROMETHAMINE 30 MG/ML
15 INJECTION, SOLUTION INTRAMUSCULAR; INTRAVENOUS ONCE
Status: DISCONTINUED | OUTPATIENT
Start: 2025-08-28 | End: 2025-08-29 | Stop reason: HOSPADM

## (undated) DEVICE — TBG PENCL TELESCP MEGADYNE SMOKE EVAC 10FT

## (undated) DEVICE — GLV SURG SENSICARE PF POLYISPRN SZ8 LF

## (undated) DEVICE — ACCU-PASS SUTURE SHUTTLE 45                                    DEGREE, UPBEND, STERILE: Brand: ACCU-PASS

## (undated) DEVICE — GLV SURG SENSICARE PI LF PF 7.0

## (undated) DEVICE — CANNULA THREADED FLEX 8.0 X 72MM: Brand: CLEAR-TRAC

## (undated) DEVICE — WRAP SHOULDER COLD THERAPY

## (undated) DEVICE — DECANTER: Brand: UNBRANDED

## (undated) DEVICE — ST TB GOFLO STRL

## (undated) DEVICE — DRESSING,GAUZE,XEROFORM,CURAD,1"X8",ST: Brand: CURAD

## (undated) DEVICE — SHOULDER STABILIZATION KIT,                                    DISPOSABLE 12 PER BOX

## (undated) DEVICE — ADAPT DB SPIKE 2PCT FOR AR6400 TBG

## (undated) DEVICE — APPL CHLORAPREP HI/LITE 26ML ORNG

## (undated) DEVICE — DECANTER BAG 9": Brand: MEDLINE INDUSTRIES, INC.

## (undated) DEVICE — SYR LUERLOK 20CC BX/50

## (undated) DEVICE — 4.5 FULL RADIUS BONECUTTER BLADES,                                    YELLOW, 8000 MAXIMUM RPM, PACKAGED 6                                    PER BOX, STERILE

## (undated) DEVICE — TOWEL,OR,DSP,ST,BLUE,STD,4/PK,20PK/CS: Brand: MEDLINE

## (undated) DEVICE — SUT ETHLN 3/0 PS2 18 IN 1669H

## (undated) DEVICE — SOL ISO/ALC RUB 70PCT 4OZ

## (undated) DEVICE — T-MAX DISPOSABLE FACE MASK 8 PER BOX

## (undated) DEVICE — SPNG GZ WOVN 4X4IN 12PLY 10/BX STRL

## (undated) DEVICE — WEREWOLF FLOW 90 COBLATION WAND: Brand: COBLATION

## (undated) DEVICE — SUT PDS 0 CT1 36IN Z346H

## (undated) DEVICE — HYPODERMIC SAFETY NEEDLE: Brand: MAGELLAN

## (undated) DEVICE — DRSNG SURESITE WNDW 4X4.5

## (undated) DEVICE — TP CLTH MEDIPORE SFT 4IN 10YD

## (undated) DEVICE — DRAPE,TOWEL,LARGE,INVISISHIELD: Brand: MEDLINE

## (undated) DEVICE — SOL IRR H2O BTL 1000ML STRL

## (undated) DEVICE — CANNULA THREADED FLEX 5.5 X 72MM: Brand: CLEAR-TRAC

## (undated) DEVICE — GLV SURG SENSICARE PI PF LF 7 GRN STRL

## (undated) DEVICE — PK SHLDR ARTHSCP 90

## (undated) DEVICE — DRP SURG U/DRP INVISISHIELD PA 48X52IN

## (undated) DEVICE — SYR LUERLOK 50ML

## (undated) DEVICE — TRAP FLD MINIVAC MEGADYNE 100ML